# Patient Record
Sex: MALE | Race: WHITE | NOT HISPANIC OR LATINO | Employment: UNEMPLOYED | URBAN - METROPOLITAN AREA
[De-identification: names, ages, dates, MRNs, and addresses within clinical notes are randomized per-mention and may not be internally consistent; named-entity substitution may affect disease eponyms.]

---

## 2018-07-21 ENCOUNTER — HOSPITAL ENCOUNTER (EMERGENCY)
Facility: CLINIC | Age: 28
Discharge: HOME | End: 2018-07-21
Attending: EMERGENCY MEDICINE | Admitting: EMERGENCY MEDICINE
Payer: COMMERCIAL

## 2018-07-21 ENCOUNTER — APPOINTMENT (OUTPATIENT)
Dept: GENERAL RADIOLOGY | Facility: CLINIC | Age: 28
End: 2018-07-21
Attending: EMERGENCY MEDICINE
Payer: COMMERCIAL

## 2018-07-21 ENCOUNTER — APPOINTMENT (OUTPATIENT)
Dept: ULTRASOUND IMAGING | Facility: CLINIC | Age: 28
End: 2018-07-21
Attending: EMERGENCY MEDICINE
Payer: COMMERCIAL

## 2018-07-21 VITALS
SYSTOLIC BLOOD PRESSURE: 124 MMHG | DIASTOLIC BLOOD PRESSURE: 72 MMHG | OXYGEN SATURATION: 99 % | RESPIRATION RATE: 18 BRPM | TEMPERATURE: 97.1 F | HEIGHT: 71 IN | WEIGHT: 180 LBS | BODY MASS INDEX: 25.2 KG/M2

## 2018-07-21 DIAGNOSIS — R11.2 NON-INTRACTABLE VOMITING WITH NAUSEA, UNSPECIFIED VOMITING TYPE: ICD-10-CM

## 2018-07-21 DIAGNOSIS — E86.0 DEHYDRATION: ICD-10-CM

## 2018-07-21 DIAGNOSIS — R10.13 ABDOMINAL PAIN, EPIGASTRIC: ICD-10-CM

## 2018-07-21 DIAGNOSIS — R07.9 CHEST PAIN, UNSPECIFIED TYPE: ICD-10-CM

## 2018-07-21 DIAGNOSIS — K29.00 ACUTE GASTRITIS, PRESENCE OF BLEEDING UNSPECIFIED, UNSPECIFIED GASTRITIS TYPE: ICD-10-CM

## 2018-07-21 LAB
ABO + RH BLD: NORMAL
ABO + RH BLD: NORMAL
ALBUMIN SERPL-MCNC: 5.3 G/DL (ref 3.4–5)
ALBUMIN UR-MCNC: 30 MG/DL
ALP SERPL-CCNC: 67 U/L (ref 40–150)
ALT SERPL W P-5'-P-CCNC: 44 U/L (ref 0–70)
ANION GAP SERPL CALCULATED.3IONS-SCNC: 15 MMOL/L (ref 3–14)
ANION GAP SERPL CALCULATED.3IONS-SCNC: 21 MMOL/L (ref 3–14)
APPEARANCE UR: CLEAR
AST SERPL W P-5'-P-CCNC: 36 U/L (ref 0–45)
BASOPHILS # BLD AUTO: 0 10E9/L (ref 0–0.2)
BASOPHILS NFR BLD AUTO: 0.1 %
BILIRUB SERPL-MCNC: 0.4 MG/DL (ref 0.2–1.3)
BILIRUB UR QL STRIP: NEGATIVE
BLD GP AB SCN SERPL QL: NORMAL
BLOOD BANK CMNT PATIENT-IMP: NORMAL
BUN SERPL-MCNC: 19 MG/DL (ref 7–30)
BUN SERPL-MCNC: 19 MG/DL (ref 7–30)
CALCIUM SERPL-MCNC: 10 MG/DL (ref 8.5–10.1)
CALCIUM SERPL-MCNC: 8.6 MG/DL (ref 8.5–10.1)
CHLORIDE SERPL-SCNC: 107 MMOL/L (ref 94–109)
CHLORIDE SERPL-SCNC: 112 MMOL/L (ref 94–109)
CO2 SERPL-SCNC: 11 MMOL/L (ref 20–32)
CO2 SERPL-SCNC: 17 MMOL/L (ref 20–32)
COLOR UR AUTO: ABNORMAL
CREAT SERPL-MCNC: 1.01 MG/DL (ref 0.66–1.25)
CREAT SERPL-MCNC: 1.16 MG/DL (ref 0.66–1.25)
DIFFERENTIAL METHOD BLD: ABNORMAL
EOSINOPHIL # BLD AUTO: 0 10E9/L (ref 0–0.7)
EOSINOPHIL NFR BLD AUTO: 0 %
ERYTHROCYTE [DISTWIDTH] IN BLOOD BY AUTOMATED COUNT: 12.9 % (ref 10–15)
GFR SERPL CREATININE-BSD FRML MDRD: 75 ML/MIN/1.7M2
GFR SERPL CREATININE-BSD FRML MDRD: 88 ML/MIN/1.7M2
GLUCOSE SERPL-MCNC: 125 MG/DL (ref 70–99)
GLUCOSE SERPL-MCNC: 127 MG/DL (ref 70–99)
GLUCOSE UR STRIP-MCNC: NEGATIVE MG/DL
HCT VFR BLD AUTO: 47.5 % (ref 40–53)
HGB BLD-MCNC: 16.6 G/DL (ref 13.3–17.7)
HGB UR QL STRIP: ABNORMAL
IMM GRANULOCYTES # BLD: 0.2 10E9/L (ref 0–0.4)
IMM GRANULOCYTES NFR BLD: 0.6 %
KETONES UR STRIP-MCNC: 80 MG/DL
LEUKOCYTE ESTERASE UR QL STRIP: NEGATIVE
LIPASE SERPL-CCNC: 72 U/L (ref 73–393)
LYMPHOCYTES # BLD AUTO: 1 10E9/L (ref 0.8–5.3)
LYMPHOCYTES NFR BLD AUTO: 3.2 %
MCH RBC QN AUTO: 31.6 PG (ref 26.5–33)
MCHC RBC AUTO-ENTMCNC: 34.9 G/DL (ref 31.5–36.5)
MCV RBC AUTO: 90 FL (ref 78–100)
MONOCYTES # BLD AUTO: 1 10E9/L (ref 0–1.3)
MONOCYTES NFR BLD AUTO: 2.9 %
NEUTROPHILS # BLD AUTO: 30.1 10E9/L (ref 1.6–8.3)
NEUTROPHILS NFR BLD AUTO: 93.2 %
NITRATE UR QL: NEGATIVE
NRBC # BLD AUTO: 0 10*3/UL
NRBC BLD AUTO-RTO: 0 /100
PH UR STRIP: 5 PH (ref 5–7)
PLATELET # BLD AUTO: 378 10E9/L (ref 150–450)
POTASSIUM SERPL-SCNC: 4.6 MMOL/L (ref 3.4–5.3)
POTASSIUM SERPL-SCNC: 5.2 MMOL/L (ref 3.4–5.3)
PROT SERPL-MCNC: 9.5 G/DL (ref 6.8–8.8)
RBC # BLD AUTO: 5.26 10E12/L (ref 4.4–5.9)
RBC #/AREA URNS AUTO: <1 /HPF (ref 0–2)
SODIUM SERPL-SCNC: 139 MMOL/L (ref 133–144)
SODIUM SERPL-SCNC: 144 MMOL/L (ref 133–144)
SOURCE: ABNORMAL
SP GR UR STRIP: 1.02 (ref 1–1.03)
SPECIMEN EXP DATE BLD: NORMAL
SQUAMOUS #/AREA URNS AUTO: <1 /HPF (ref 0–1)
UROBILINOGEN UR STRIP-MCNC: NORMAL MG/DL (ref 0–2)
WBC # BLD AUTO: 32.3 10E9/L (ref 4–11)
WBC #/AREA URNS AUTO: 1 /HPF (ref 0–5)

## 2018-07-21 PROCEDURE — 25000132 ZZH RX MED GY IP 250 OP 250 PS 637: Performed by: EMERGENCY MEDICINE

## 2018-07-21 PROCEDURE — 96375 TX/PRO/DX INJ NEW DRUG ADDON: CPT

## 2018-07-21 PROCEDURE — 81001 URINALYSIS AUTO W/SCOPE: CPT | Performed by: EMERGENCY MEDICINE

## 2018-07-21 PROCEDURE — 86850 RBC ANTIBODY SCREEN: CPT | Performed by: EMERGENCY MEDICINE

## 2018-07-21 PROCEDURE — 86901 BLOOD TYPING SEROLOGIC RH(D): CPT | Performed by: EMERGENCY MEDICINE

## 2018-07-21 PROCEDURE — 76705 ECHO EXAM OF ABDOMEN: CPT

## 2018-07-21 PROCEDURE — 80053 COMPREHEN METABOLIC PANEL: CPT | Performed by: EMERGENCY MEDICINE

## 2018-07-21 PROCEDURE — 99285 EMERGENCY DEPT VISIT HI MDM: CPT | Mod: 25

## 2018-07-21 PROCEDURE — 96376 TX/PRO/DX INJ SAME DRUG ADON: CPT

## 2018-07-21 PROCEDURE — 96365 THER/PROPH/DIAG IV INF INIT: CPT

## 2018-07-21 PROCEDURE — 93005 ELECTROCARDIOGRAM TRACING: CPT

## 2018-07-21 PROCEDURE — 25000125 ZZHC RX 250: Performed by: EMERGENCY MEDICINE

## 2018-07-21 PROCEDURE — 83690 ASSAY OF LIPASE: CPT | Performed by: EMERGENCY MEDICINE

## 2018-07-21 PROCEDURE — 85025 COMPLETE CBC W/AUTO DIFF WBC: CPT | Performed by: EMERGENCY MEDICINE

## 2018-07-21 PROCEDURE — 86900 BLOOD TYPING SEROLOGIC ABO: CPT | Performed by: EMERGENCY MEDICINE

## 2018-07-21 PROCEDURE — 71046 X-RAY EXAM CHEST 2 VIEWS: CPT

## 2018-07-21 PROCEDURE — 96361 HYDRATE IV INFUSION ADD-ON: CPT

## 2018-07-21 PROCEDURE — 25000128 H RX IP 250 OP 636: Performed by: EMERGENCY MEDICINE

## 2018-07-21 PROCEDURE — 80048 BASIC METABOLIC PNL TOTAL CA: CPT | Performed by: EMERGENCY MEDICINE

## 2018-07-21 RX ORDER — MORPHINE SULFATE 4 MG/ML
4 INJECTION, SOLUTION INTRAMUSCULAR; INTRAVENOUS ONCE
Status: COMPLETED | OUTPATIENT
Start: 2018-07-21 | End: 2018-07-21

## 2018-07-21 RX ORDER — ONDANSETRON 2 MG/ML
4 INJECTION INTRAMUSCULAR; INTRAVENOUS EVERY 30 MIN PRN
Status: DISCONTINUED | OUTPATIENT
Start: 2018-07-21 | End: 2018-07-21 | Stop reason: HOSPADM

## 2018-07-21 RX ORDER — ONDANSETRON 2 MG/ML
4 INJECTION INTRAMUSCULAR; INTRAVENOUS ONCE
Status: COMPLETED | OUTPATIENT
Start: 2018-07-21 | End: 2018-07-21

## 2018-07-21 RX ORDER — SUCRALFATE 1 G/1
1 TABLET ORAL 4 TIMES DAILY
Qty: 40 TABLET | Refills: 1 | Status: SHIPPED | OUTPATIENT
Start: 2018-07-21 | End: 2023-09-26

## 2018-07-21 RX ORDER — MORPHINE SULFATE 4 MG/ML
4 INJECTION, SOLUTION INTRAMUSCULAR; INTRAVENOUS
Status: DISCONTINUED | OUTPATIENT
Start: 2018-07-21 | End: 2018-07-21 | Stop reason: HOSPADM

## 2018-07-21 RX ORDER — FAMOTIDINE 20 MG/1
20 TABLET, FILM COATED ORAL ONCE
Status: COMPLETED | OUTPATIENT
Start: 2018-07-21 | End: 2018-07-21

## 2018-07-21 RX ORDER — ONDANSETRON 4 MG/1
4 TABLET, ORALLY DISINTEGRATING ORAL EVERY 8 HOURS PRN
Qty: 10 TABLET | Refills: 0 | Status: SHIPPED | OUTPATIENT
Start: 2018-07-21 | End: 2018-07-24

## 2018-07-21 RX ADMIN — PANTOPRAZOLE SODIUM 40 MG: 40 INJECTION, POWDER, FOR SOLUTION INTRAVENOUS at 04:29

## 2018-07-21 RX ADMIN — SODIUM CHLORIDE 1000 ML: 9 INJECTION, SOLUTION INTRAVENOUS at 05:09

## 2018-07-21 RX ADMIN — FOLIC ACID: 5 INJECTION, SOLUTION INTRAMUSCULAR; INTRAVENOUS; SUBCUTANEOUS at 05:54

## 2018-07-21 RX ADMIN — MORPHINE SULFATE 4 MG: 4 INJECTION INTRAVENOUS at 04:22

## 2018-07-21 RX ADMIN — SODIUM CHLORIDE 1000 ML: 9 INJECTION, SOLUTION INTRAVENOUS at 04:28

## 2018-07-21 RX ADMIN — FAMOTIDINE 20 MG: 10 INJECTION INTRAVENOUS at 04:37

## 2018-07-21 RX ADMIN — LIDOCAINE HYDROCHLORIDE 30 ML: 20 SOLUTION ORAL; TOPICAL at 05:18

## 2018-07-21 RX ADMIN — MORPHINE SULFATE 4 MG: 4 INJECTION INTRAVENOUS at 05:00

## 2018-07-21 RX ADMIN — FAMOTIDINE 20 MG: 20 TABLET, FILM COATED ORAL at 08:40

## 2018-07-21 RX ADMIN — ONDANSETRON 4 MG: 2 INJECTION, SOLUTION INTRAMUSCULAR; INTRAVENOUS at 04:32

## 2018-07-21 ASSESSMENT — ENCOUNTER SYMPTOMS
CHEST TIGHTNESS: 1
VOMITING: 1

## 2018-07-21 NOTE — ED PROVIDER NOTES
"  History     Chief Complaint:  Chest Pain     HPI   Chuck Magdaleno is a 27 year old male who presents with chest pain. The patient states he has had emesis for 14 hours. The patient states he has chest pain on the right side for the past 45 minutes following the emesis. The patient states that it hurts to breath. The patient also notes he is thirsty. Of note: the sister states the patient is hung over.     Allergies:  No Known Drug Allergies    Medications:    Medications reviewed. No current medications.     Past Medical History:    Alcohol Abuse   Marijuana dependence  Depression     Past Surgical History:    Appendectomy     Family History:    Family history reviewed. No pertinent family history.     Social History:  The patient was accompanied to the ED by sister.  Alcohol Use: Positive  Marital Status:  Single     Review of Systems   Constitutional:        Thirsty   Respiratory: Positive for chest tightness.    Cardiovascular: Positive for chest pain.   Gastrointestinal: Positive for vomiting.   All other systems reviewed and are negative.      Physical Exam     Patient Vitals for the past 24 hrs:   BP Temp Temp src Heart Rate Resp SpO2 Height Weight   07/21/18 0531 - - - 110 - 99 % - -   07/21/18 0514 - - - - 20 - - -   07/21/18 0503 - - - 104 - 97 % - -   07/21/18 0447 - - - 116 24 96 % - -   07/21/18 0435 - - - 117 - 94 % - -   07/21/18 0434 142/76 - - - - - - -   07/21/18 0428 - - - 128 - 94 % - -   07/21/18 0411 (!) 180/103 97.1  F (36.2  C) Oral 137 20 95 % 1.803 m (5' 11\") 81.6 kg (180 lb)   07/21/18 0410 - - - 138 (!) 31 95 % - -   07/21/18 0406 - 97.9  F (36.6  C) Oral 150 20 97 % 1.803 m (5' 11\") 81.6 kg (180 lb)       Physical Exam  Constitutional:  Appears well-developed and well-nourished. Cooperative. Uncomfortable. Conversational disnea   HENT:   Head:    Atraumatic.   Mouth/Throat:   Oropharynx is without erythema or exudate and mucous     membranes are dry.   Eyes:    Conjunctivae normal " and EOM are normal.      Pupils are equal, round, and reactive to light.   Neck:    Normal range of motion. Neck supple.   Cardiovascular:  Tachycardia, regular rhythm, normal heart sounds and radial and dorsalis pedis pulses are 2+ and symmetric.    Pulmonary/Chest:  Effort normal and breath sounds are diminished in both lung bases. No rales or wheezes.   Abdominal:   Soft. Bowel sounds are normal.      No splenomegaly or hepatomegaly. Tender in mid epigastric right upper quadrant. No rebound.   Musculoskeletal:  Normal range of motion. No edema and no tenderness.   Neurological:  Alert. Normal strength. No cranial nerve deficit.   Skin:    Skin is warm and dry.   Psychiatric:   Normal mood and affect.     Emergency Department Course     ECG:  ECG taken at 0411, ECG read at 0413  Sinus Tachycardia  Otherwise Normal ECG  Rate 134 bpm. CO interval 122 ms. QRS duration 76 ms. QT/QTc 300/448 ms. P-R-T axes 76 68 81.    Imaging:  Radiology findings were communicated with the patient who voiced understanding of the findings.    Chest XR,  PA & LAT   No infiltrates or other acute findings. Heart size is  within normal limits.  JOAN FARNK MD  Reading per radiology    Laboratory:  Laboratory findings were communicated with the patient who voiced understanding of the findings.    CBC: WBC 32.3(H), HGB 16.6,   CMP: glucose 125 (H), Carbon dioxide 11(L), Anion Gap 21(H), Albumin 5.3(H), protein 9.5(H) o/w WNL (Creatinine 1.16)  Lipase: 72(L)  ABO/Rh type and screen: O-, antibody screen negative     Interventions:  0422 Morphine 4 mg IV  0428 NS 1000 mL IV  0429 Protonix 40 mg IV  0432 Zofran 4 mg IV  0437 Pepcid 20 mg IV  0500 Morphine 4 mg IV  0509 NS 1000 mL IV  0518 GI Cocktail 30 mL Oral     Emergency Department Course:    0408 Nursing notes and vitals reviewed.    0415 I performed an exam of the patient as documented above.     0415 IV was inserted and blood was drawn for laboratory testing, results  above.    0452 The patient was sent for a Chest XR while in the emergency department, results above.     0545 I returned to update the patient.     0600 I transferred care to my college Dr. Contreras    Impression & Plan      Medical Decision Making:  Chuck Magdaleno is a 27 year old male who presents complaining of nausea, vomiting, and sudden worsening of right upper quadrant and chest pain which prompted his ED visit. HE reports pain with movements and deep breaths. Clinically the patient appears dehydrated. He is tachycardic. He has a diffusely tender but non focal abdominal exam. An IV established and received 2 L of fluid, morphine in divided doses, Protonix, and Pepcid. Symptoms to improve with this. After GI cocktail he stated that pain had nearly resolved. Repeated abdominal exam is soft and nontender. His lungs sound clear.   [place] blood [place] at [address] with left shift. There is no fever or peritoneal signs. There is no history of aspiration, and chest Xray looks normal without any free air to the diaphragm. No pneumothorax, pneumomediastinum infiltrate, abnormal cardiac or silhouette abnormality. The patient has no risk for PE. His EKG showed sinus tachycardia without ischemic changes. CMP returned showing a low bicarbonate 11 and an anion gap of 21. I suspect that this is due to dehydration and hyperventilation. Likely an elevated serum lactate. He denies any co injections or drinking non ethanol alcohol.   An IV banana bag is ordered. The plan is to hydrate him and repeat a metabiotic panel.  His acid base seems to be correcting and the patient is not having any worsening symptoms or ongoing abdominal pain. The plan will be for discharge. Even with his elevated white cell count, I do not suspect an intraabdominal catastrophe. If pain reoccurs or exam changes, CT imaging may be indicated.   I discussed all this with Dr. Contreras at 0600 and have signed the patient out to him. He will  follow up on the repeat metabolic panel and exam. Urinalysis is also pending as the patient's pain is right sided I want to ensure there are no signs of hematuria.   I discuss test results thus far with the patient and his sister as well as the treatment plan. IF symptoms and labs continue to improve, the plan is for discharge. I will have the patient take omeprazole for a likely gastritis or ulcer and follow up with primary care. Preliminary impression.     Diagnosis:    ICD-10-CM    1. Abdominal pain, epigastric R10.13 UA with Microscopic   2. Chest pain, unspecified type R07.9    3. Non-intractable vomiting with nausea, unspecified vomiting type R11.2    4. Acute gastritis, presence of bleeding unspecified, unspecified gastritis type K29.00    5. Dehydration E86.0         Disposition:   I transferred care to my [facility] Dr. Contreras    Discharge Medications:  Discharge Medication List as of 7/21/2018  9:06 AM      START taking these medications    Details   omeprazole (PRILOSEC) 20 MG CR capsule Take 1 capsule (20 mg) by mouth 2 times daily, Disp-60 capsule, R-0, Local Print      ondansetron (ZOFRAN ODT) 4 MG ODT tab Take 1 tablet (4 mg) by mouth every 8 hours as needed for nausea, Disp-10 tablet, R-0, Local Print      sucralfate (CARAFATE) 1 GM tablet Take 1 tablet (1 g) by mouth 4 times daily, Disp-40 tablet, R-1, Local Print           Scribe Disclosure:  I, Karen Ferreiraion, am serving as a scribe at 4:14 AM on 7/21/2018 to document services personally performed by Sriram Martinez MD based on my observations and the provider's statements to me.   EMERGENCY DEPARTMENT       Sriram Martinez MD  07/22/18 0751

## 2018-07-21 NOTE — ED AVS SNAPSHOT
Emergency Department    [address] [place] [id number]    Phone:  [phone]    Fax:  [phone]                                       Chuck Magdaleno   MRN: [id number]    Department:   Emergency Department   Date of Visit:  7/21/2018           After Visit Summary Signature Page     I have received my discharge instructions, and my questions have been answered. I have discussed any challenges I see with this plan with the nurse or doctor.    ..........................................................................................................................................  Patient/Patient Representative Signature      ..........................................................................................................................................  Patient Representative Print Name and Relationship to Patient    ..................................................               ................................................  Date                                            Time    ..........................................................................................................................................  Reviewed by Signature/Title    ...................................................              ..............................................  Date                                                            Time

## 2018-07-21 NOTE — DISCHARGE INSTRUCTIONS
*CHEST PAIN, NONCARDIAC    Based on your visit today, the exact cause of your chest pain is not certain. Your condition does not seem serious and your pain does not appear to be coming from your heart. However, sometimes the signs of a serious problem take more time to appear. Therefore, please watch for the warning signs listed below.  HOME CARE:  1. Rest today and avoid strenuous activity.  2. Take any prescribed medicine as directed.  FOLLOW UP with your doctor in 1-3 days.   GET PROMPT MEDICAL ATTENTION if any of the following occur:    A change in the type of pain: if it feels different, becomes more severe, lasts longer, or begins to spread into your shoulder, arm, neck, jaw or back    Shortness of breath or increased pain with breathing    Cough with blood or dark colored sputum (phlegm)    Weakness, dizziness, or fainting    Fever over 101  F (38.3  C)    Swelling, pain or redness in one leg    8538-2196 [employer]. [address] [place] [address]. All rights reserved. This information is not intended as a substitute for professional medical care. Always follow your healthcare professional's instructions.  This information has been modified by your health care provider with permission from the publisher.      Dehydration (Adult)  Dehydration occurs when your body loses too much fluid. This may be the result of prolonged vomiting or diarrhea, excessive sweating, or a high fever. It may also happen if you don t drink enough fluid when you re sick or out in the heat. Misuse of diuretics (water pills) can also be a cause.  Symptoms include thirst and decreased urine output. You may also feel dizzy, weak, fatigued, or very drowsy. The diet described below is usually enough to treat dehydration. In some cases, you may need medicine.  Home care    Drink at least 12 8-ounce glasses of fluid every day to resolve the dehydration. Fluid may include water; orange juice; lemonade; apple, grape,  or cranberry juice; clear fruit drinks; electrolyte replacement and sports drinks; and teas and coffee without caffeine. Don't drink alcohol. If you have been diagnosed with a kidney disease, ask your doctor how much and what types of fluids you should drink to prevent dehydration. If you have kidney disease, fluid can build up in the body. This can be dangerous to your health.    If you have a fever, muscle aches, or a headache as a result of a cold or flu, you may take acetaminophen or ibuprofen, unless another medicine was prescribed. If you have chronic liver or kidney disease, or have ever had a stomach ulcer or gastrointestinal bleeding, talk with your healthcare provider before using these medicines. Don't take aspirin if you are younger than 18 and have a fever. Aspirin raises the chance for severe liver injury.  Follow-up care  Follow up with your healthcare provider, or as advised.  When to seek medical advice  Call your healthcare provider right away if any of these occur:    Continued vomiting    Frequent diarrhea (more than 5 times a day); blood (red or black color) or mucus in diarrhea    Blood in vomit or stool    Swollen abdomen or increasing abdominal pain    Weakness, dizziness, or fainting    Unusual drowsiness or confusion    Reduced urine output or extreme thirst    Fever of 100.4 F (38 C) or higher  Date Last Reviewed: 5/1/2017 2000-2017 [employer]. [address] [place] [address]. All rights reserved. This information is not intended as a substitute for professional medical care. Always follow your healthcare professional's instructions.          Gastritis or Ulcer, No Antibiotic Treatment    Gastritis is irritation and inflammation of the stomach lining. This means the lining is red and swollen. An ulcer is an open sore in the lining of the stomach. It may also occur in the first part of the small intestine (duodenum). The causes and symptoms of gastritis and  ulcers are very similar.  Causes and risk factors for both problems can include:    Long-term use of nonsteroidal anti-inflammatory drugs (NSAIDs), such as aspirin and ibuprofen    H. pylori bacteria infection    Tobacco use    Alcohol use  Symptoms for both problems can include:    Dull or burning pain in the upper part of the belly    Loss of appetite    Heartburn or upset stomach    Frequent burping    Bloated feeling    Nausea with or without vomiting  You likely had an evaluation to help determine the exact cause and extent of your problem. This may have included a health history, exam, and certain tests.  Results showed that your problem is not due to H. pylori infection. For this reason, no antibiotics are needed as part of your treatment.  Whether your problem is found to be gastritis or an ulcer, you will still need to take other medicines, however. You will also need to follow instructions to help reduce stomach irritation so your stomach can heal.   Home care    Take any medicines you re prescribed exactly as directed. Common medicines used to treat gastritis include:  ? Antacids. These help neutralize the normal acids in your stomach.  ? Proton pump inhibitors. These block your stomach from making any acid.  ? H2 blockers.These reduce the amount of acid your stomach makes.  ? Bismuth subsalicylate. This helps protect the lining of your stomach from acid.    Don't take any NSAIDs during your treatment. If you take NSAID to help treat other health problems, tell your healthcare provider. He or she may need to adjust your medicine plan or change the dosage.    Don t use tobacco. Also don t drink alcohol. These products can increase the amount of acid your stomach makes. This can delay healing. It can also worsen symptoms.  Follow-up care  Follow up with your healthcare provider, or as advised. In some cases, further testing may be needed.  When to seek medical advice  Call your healthcare provider right  "away if any of these occur:    Fever of 100.4 F (38 C) or higher, or as directed by your healthcare provider    Stomach pain that worsens or moves to the lower right part of abdomen    Extreme fatigue    Weakness or dizziness    Continued weight loss    Frequent vomiting, blood in your vomit, or coffee ground-like substance in your vomit    Black, tarry, or bloody stools  Call 911  Call 911 if any of these occur:    Chest pain appears or worsens, or spreads to the back, neck, shoulder, or arm    Unusually fast heart rate    Trouble breathing or swallowing    Confusion     * VOMITING [6yr-Adult]  Vomiting is a common symptom that may be due to different causes. These include gastroenteritis (\"stomach-flu\"), food poisoning and gastritis. There are other more serious causes of vomiting which may be hard to diagnose early in the illness. Therefore, it is important to watch for the warning signs listed below.  The main danger from repeated vomiting is \"dehydration\". This is due to excess loss of water and minerals from the body. When this occurs, body fluids must be replaced.`  HOME CARE:    If symptoms are severe, rest at home for the next 24 hours.  You may use acetaminophen (Tylenol) 650-1000 mg every 6 hours to control fever, unless another medicine was prescribed. [ NOTE : If you have chronic liver disease, talk with your doctor before using acetaminophen.] (Aspirin should never be used in anyone under 18 years of age who is ill with a fever. It may cause severe liver damage.)  Avoid tobacco and alcohol use, which may worsen your symptoms.  If medicines for vomiting were prescribed, take as directed.  DURING THE FIRST 12-24 HOURS follow the diet below. Try to take frequent small sips even if you vomit occasionally:  FRUIT JUICES: Apple, grape juice, clear fruit drinks, electrolyte replacement and sports drinks.  BEVERAGES: Sport drinks such as Gatorade, soft drinks without caffeine; mineral water (plain or " flavored), decaffeinated tea and coffee.  SOUPS: Clear broth, consommé and bouillon  DESSERTS: Plain gelatin (Jell-O), popsicles and fruit juice bars.  DURING THE NEXT 24 HOURS you may add the following to the above:  Hot cereal, plain toast, bread, rolls, crackers  Plain noodles, rice, mashed potatoes, chicken noodle or rice soup  Unsweetened canned fruit (avoid pineapple), bananas  Avoid dairy products  Limit caffeine and chocolate. No spices or seasonings except salt.  DURING THE NEXT 24 HOURS  Slowly go back to a normal diet, as you feel better and your symptoms lessen.  FOLLOW UP with your doctor as advised if you are not improving over the next 2-3 days.  GET PROMPT MEDICAL ATTENTION if any of the following occur:  Constant abdominal pain that stays in the same spot or gets worse  Continued vomiting (unable to keep liquids down) for 24 hours  Frequent diarrhea (more than 5 times a day); blood (red or black color) in diarrhea  No urine output for 12 hours or extreme thirst  Weakness, dizziness or fainting  Unusually drowsy or confused  Fever over 101.0  F (38.3  C) for more than 3 days  Yellow color of the eyes or skin    1142-2501 [employer]. [address] [place] [address]. All rights reserved. This information is not intended as a substitute for professional medical care. Always follow your healthcare professional's instructions.  This information has been modified by your health care provider with permission from the publisher.        Extreme drowsiness or trouble waking up    Fainting    Large amounts of blood present in vomit or stool  Date Last Reviewed: 6/16/2015 2000-2017 [employer]. [address] [place] [address]. All rights reserved. This information is not intended as a substitute for professional medical care. Always follow your healthcare professional's instructions.

## 2018-07-21 NOTE — ED NOTES
ED course:  I received patient as a handoff from my partner Dr. Christianson.  On reevaluation patient notes mild persistent right upper quadrant pain and therefore right upper quadrant ultrasound was obtained and unremarkable.  Repeat basic metabolic panel shows improvement in patient's anion gap; likely secondary to starvation ketosis.  Patient remained afebrile and heart rate continued to trend down with hydration.  He was provided additional Pepcid for pain control.  Repeat abdominal exam was benign just prior to discharge.  Recommended close follow-up with PCP.  Return precautions discussed.  Patient well-appearing at time of discharge.  Please see Dr. Christianson's note for additional details.    Impression:    ICD-10-CM    1. Abdominal pain, epigastric R10.13 UA with Microscopic   2. Chest pain, unspecified type R07.9    3. Non-intractable vomiting with nausea, unspecified vomiting type R11.2    4. Acute gastritis, presence of bleeding unspecified, unspecified gastritis type K29.00    5. Dehydration E86.0        Disposition:  Discharged home     Silvano Contreras,   07/21/18 0951

## 2018-07-21 NOTE — ED AVS SNAPSHOT
Emergency Department    [address] [facility] [address]    Phone:  [phone]    Fax:  [phone]                                       Chuck Magdaleno   MRN: [id number]    Department:   Emergency Department   Date of Visit:  7/21/2018           Patient Information     Date Of Birth          1990        Your diagnoses for this visit were:     Abdominal pain, epigastric     Chest pain, unspecified type     Non-intractable vomiting with nausea, unspecified vomiting type     Acute gastritis, presence of bleeding unspecified, unspecified gastritis type     Dehydration        You were seen by Sriram Martinez MD and Silvano Contreras DO.      Follow-up Information     Follow up with Tucker Chan MD. Schedule an appointment as soon as possible for a visit in 2 days.    Specialty:  Family Practice    Contact information:    [facility]  [address] [id number] [phone]          Follow up with  Emergency Department.    Specialty:  EMERGENCY MEDICINE    Why:  If symptoms worsen    Contact information:    [address] [facility] [address] [phone]        Discharge Instructions         *CHEST PAIN, NONCARDIAC    Based on your visit today, the exact cause of your chest pain is not certain. Your condition does not seem serious and your pain does not appear to be coming from your heart. However, sometimes the signs of a serious problem take more time to appear. Therefore, please watch for the warning signs listed below.  HOME CARE:  1. Rest today and avoid strenuous activity.  2. Take any prescribed medicine as directed.  FOLLOW UP with your doctor in 1-3 days.   GET PROMPT MEDICAL ATTENTION if any of the following occur:    A change in the type of pain: if it feels different, becomes more severe, lasts longer, or begins to spread into your shoulder, arm, neck, jaw or back    Shortness of breath or increased pain  with breathing    Cough with blood or dark colored sputum (phlegm)    Weakness, dizziness, or fainting    Fever over 101  F (38.3  C)    Swelling, pain or redness in one leg    0609-7402 [employer]. [address] [place] [address], [place] [address]. All rights reserved. This information is not intended as a substitute for professional medical care. Always follow your healthcare professional's instructions.  This information has been modified by your health care provider with permission from the publisher.      Dehydration (Adult)  Dehydration occurs when your body loses too much fluid. This may be the result of prolonged vomiting or diarrhea, excessive sweating, or a high fever. It may also happen if you don t drink enough fluid when you re sick or out in the heat. Misuse of diuretics (water pills) can also be a cause.  Symptoms include thirst and decreased urine output. You may also feel dizzy, weak, fatigued, or very drowsy. The diet described below is usually enough to treat dehydration. In some cases, you may need medicine.  Home care    Drink at least 12 8-ounce glasses of fluid every day to resolve the dehydration. Fluid may include water; orange juice; lemonade; apple, grape, or cranberry juice; clear fruit drinks; electrolyte replacement and sports drinks; and teas and coffee without caffeine. Don't drink alcohol. If you have been diagnosed with a kidney disease, ask your doctor how much and what types of fluids you should drink to prevent dehydration. If you have kidney disease, fluid can build up in the body. This can be dangerous to your health.    If you have a fever, muscle aches, or a headache as a result of a cold or flu, you may take acetaminophen or ibuprofen, unless another medicine was prescribed. If you have chronic liver or kidney disease, or have ever had a stomach ulcer or gastrointestinal bleeding, talk with your healthcare provider before using these medicines. Don't take aspirin if  you are younger than 18 and have a fever. Aspirin raises the chance for severe liver injury.  Follow-up care  Follow up with your healthcare provider, or as advised.  When to seek medical advice  Call your healthcare provider right away if any of these occur:    Continued vomiting    Frequent diarrhea (more than 5 times a day); blood (red or black color) or mucus in diarrhea    Blood in vomit or stool    Swollen abdomen or increasing abdominal pain    Weakness, dizziness, or fainting    Unusual drowsiness or confusion    Reduced urine output or extreme thirst    Fever of 100.4 F (38 C) or higher  Date Last Reviewed: 5/1/2017 2000-2017 [employer]. [address] [place], [place] [id number]. All rights reserved. This information is not intended as a substitute for professional medical care. Always follow your healthcare professional's instructions.          Gastritis or Ulcer, No Antibiotic Treatment    Gastritis is irritation and inflammation of the stomach lining. This means the lining is red and swollen. An ulcer is an open sore in the lining of the stomach. It may also occur in the first part of the small intestine (duodenum). The causes and symptoms of gastritis and ulcers are very similar.  Causes and risk factors for both problems can include:    Long-term use of nonsteroidal anti-inflammatory drugs (NSAIDs), such as aspirin and ibuprofen    H. pylori bacteria infection    Tobacco use    Alcohol use  Symptoms for both problems can include:    Dull or burning pain in the upper part of the belly    Loss of appetite    Heartburn or upset stomach    Frequent burping    Bloated feeling    Nausea with or without vomiting  You likely had an evaluation to help determine the exact cause and extent of your problem. This may have included a health history, exam, and certain tests.  Results showed that your problem is not due to H. pylori infection. For this reason, no antibiotics are needed as part of  "your treatment.  Whether your problem is found to be gastritis or an ulcer, you will still need to take other medicines, however. You will also need to follow instructions to help reduce stomach irritation so your stomach can heal.   Home care    Take any medicines you re prescribed exactly as directed. Common medicines used to treat gastritis include:  ? Antacids. These help neutralize the normal acids in your stomach.  ? Proton pump inhibitors. These block your stomach from making any acid.  ? H2 blockers.These reduce the amount of acid your stomach makes.  ? Bismuth subsalicylate. This helps protect the lining of your stomach from acid.    Don't take any NSAIDs during your treatment. If you take NSAID to help treat other health problems, tell your healthcare provider. He or she may need to adjust your medicine plan or change the dosage.    Don t use tobacco. Also don t drink alcohol. These products can increase the amount of acid your stomach makes. This can delay healing. It can also worsen symptoms.  Follow-up care  Follow up with your healthcare provider, or as advised. In some cases, further testing may be needed.  When to seek medical advice  Call your healthcare provider right away if any of these occur:    Fever of 100.4 F (38 C) or higher, or as directed by your healthcare provider    Stomach pain that worsens or moves to the lower right part of abdomen    Extreme fatigue    Weakness or dizziness    Continued weight loss    Frequent vomiting, blood in your vomit, or coffee ground-like substance in your vomit    Black, tarry, or bloody stools  Call 911  Call 911 if any of these occur:    Chest pain appears or worsens, or spreads to the back, neck, shoulder, or arm    Unusually fast heart rate    Trouble breathing or swallowing    Confusion     * VOMITING [6yr-Adult]  Vomiting is a common symptom that may be due to different causes. These include gastroenteritis (\"stomach-flu\"), food poisoning and " "gastritis. There are other more serious causes of vomiting which may be hard to diagnose early in the illness. Therefore, it is important to watch for the warning signs listed below.  The main danger from repeated vomiting is \"dehydration\". This is due to excess loss of water and minerals from the body. When this occurs, body fluids must be replaced.`  HOME CARE:    If symptoms are severe, rest at home for the next 24 hours.  You may use acetaminophen (Tylenol) 650-1000 mg every 6 hours to control fever, unless another medicine was prescribed. [ NOTE : If you have chronic liver disease, talk with your doctor before using acetaminophen.] (Aspirin should never be used in anyone under 18 years of age who is ill with a fever. It may cause severe liver damage.)  Avoid tobacco and alcohol use, which may worsen your symptoms.  If medicines for vomiting were prescribed, take as directed.  DURING THE FIRST 12-24 HOURS follow the diet below. Try to take frequent small sips even if you vomit occasionally:  FRUIT JUICES: Apple, grape juice, clear fruit drinks, electrolyte replacement and sports drinks.  BEVERAGES: Sport drinks such as Gatorade, soft drinks without caffeine; mineral water (plain or flavored), decaffeinated tea and coffee.  SOUPS: Clear broth, consommé and bouillon  DESSERTS: Plain gelatin (Jell-O), popsicles and fruit juice bars.  DURING THE NEXT 24 HOURS you may add the following to the above:  Hot cereal, plain toast, bread, rolls, crackers  Plain noodles, rice, mashed potatoes, chicken noodle or rice soup  Unsweetened canned fruit (avoid pineapple), bananas  Avoid dairy products  Limit caffeine and chocolate. No spices or seasonings except salt.  DURING THE NEXT 24 HOURS  Slowly go back to a normal diet, as you feel better and your symptoms lessen.  FOLLOW UP with your doctor as advised if you are not improving over the next 2-3 days.  GET PROMPT MEDICAL ATTENTION if any of the following occur:  Constant " abdominal pain that stays in the same spot or gets worse  Continued vomiting (unable to keep liquids down) for 24 hours  Frequent diarrhea (more than 5 times a day); blood (red or black color) in diarrhea  No urine output for 12 hours or extreme thirst  Weakness, dizziness or fainting  Unusually drowsy or confused  Fever over 101.0  F (38.3  C) for more than 3 days  Yellow color of the eyes or skin    9352-5992 [employer]. [address] [place] [address]. All rights reserved. This information is not intended as a substitute for professional medical care. Always follow your healthcare professional's instructions.  This information has been modified by your health care provider with permission from the publisher.        Extreme drowsiness or trouble waking up    Fainting    Large amounts of blood present in vomit or stool  Date Last Reviewed: 6/16/2015 2000-2017 [employer]. [address] [place] [address]. All rights reserved. This information is not intended as a substitute for professional medical care. Always follow your healthcare professional's instructions.          24 Hour Appointment Hotline       To make an appointment at any [facility], call [phone] ([phone]). If you don't have a family doctor or clinic, we will help you find one. [place] [facility] clinics are conveniently located to serve the needs of you and your family.             Review of your medicines      START taking        Dose / Directions Last dose taken    omeprazole 20 MG CR capsule   Commonly known as:  priLOSEC   Dose:  20 mg   Quantity:  60 capsule        Take 1 capsule (20 mg) by mouth 2 times daily   Refills:  0        ondansetron 4 MG ODT tab   Commonly known as:  ZOFRAN ODT   Dose:  4 mg   Quantity:  10 tablet        Take 1 tablet (4 mg) by mouth every 8 hours as needed for nausea   Refills:  0        sucralfate 1 GM tablet   Commonly known as:  CARAFATE   Dose:  1 g    Quantity:  40 tablet        Take 1 tablet (1 g) by mouth 4 times daily   Refills:  1          Our records show that you are taking the medicines listed below. If these are incorrect, please call your family doctor or clinic.        Dose / Directions Last dose taken    HYDROcodone-acetaminophen 5-325 MG per tablet   Commonly known as:  NORCO   Dose:  1-2 tablet   Quantity:  20 tablet        Take 1-2 tablets by mouth every 4 hours as needed for moderate to severe pain   Refills:  0                Prescriptions were sent or printed at these locations (3 Prescriptions)                   Other Prescriptions                Printed at Department/Unit printer (3 of 3)         omeprazole (PRILOSEC) 20 MG CR capsule               ondansetron (ZOFRAN ODT) 4 MG ODT tab               sucralfate (CARAFATE) 1 GM tablet                Procedures and tests performed during your visit     ABO/Rh type and screen    Abdomen US, limited (RUQ only)    Basic metabolic panel    CBC with platelets + differential    Cardiac Continuous Monitoring    Chest XR,  PA & LAT    Comprehensive metabolic panel    EKG 12 lead    Lipase    UA with Microscopic      Orders Needing Specimen Collection     None      Pending Results     No orders found from 7/19/2018 to 7/22/2018.            Pending Culture Results     No orders found from 7/19/2018 to 7/22/2018.            Pending Results Instructions     If you had any lab results that were not finalized at the time of your Discharge, you can call the ED Lab Result RN at [phone]. You will be contacted by this team for any positive Lab results or changes in treatment. The nurses are available 7 days a week from 10A to 6:30P.  You can leave a message 24 hours per day and they will return your call.        Test Results From Your Hospital Stay        7/21/2018  5:25 AM      Narrative     XR CHEST 2 VW  7/21/2018 4:59 AM     INDICATION: Chest pain, shortness of breath, left upper abdominal pain  post  vomiting all day.    COMPARISON: None.        Impression     IMPRESSION: No infiltrates or other acute findings. Heart size is  within normal limits.    JOAN FRANK MD         7/21/2018  5:10 AM      Component Results     Component Value Ref Range & Units Status    Sodium 139 133 - 144 mmol/L Final    Potassium 4.6 3.4 - 5.3 mmol/L Final    Chloride 107 94 - 109 mmol/L Final    Carbon Dioxide 11 (L) 20 - 32 mmol/L Final    Anion Gap 21 (H) 3 - 14 mmol/L Final    Glucose 125 (H) 70 - 99 mg/dL Final    Urea Nitrogen 19 7 - 30 mg/dL Final    Creatinine 1.16 0.66 - 1.25 mg/dL Final    GFR Estimate 75 >60 mL/min/1.7m2 Final    Non  GFR Calc    GFR Estimate If Black >90 >60 mL/min/1.7m2 Final    African American GFR Calc    Calcium 10.0 8.5 - 10.1 mg/dL Final    Bilirubin Total 0.4 0.2 - 1.3 mg/dL Final    Albumin 5.3 (H) 3.4 - 5.0 g/dL Final    Protein Total 9.5 (H) 6.8 - 8.8 g/dL Final    Alkaline Phosphatase 67 40 - 150 U/L Final    ALT 44 0 - 70 U/L Final    AST 36 0 - 45 U/L Final         7/21/2018  5:08 AM      Component Results     Component Value Ref Range & Units Status    Lipase 72 (L) 73 - 393 U/L Final         7/21/2018  6:10 AM      Component Results     Component Value Ref Range & Units Status    Color Urine Light Yellow  Final    Appearance Urine Clear  Final    Glucose Urine Negative NEG^Negative mg/dL Final    Bilirubin Urine Negative NEG^Negative Final    Ketones Urine 80 (A) NEG^Negative mg/dL Final    Specific Gravity Urine 1.016 1.003 - 1.035 Final    Blood Urine Trace (A) NEG^Negative Final    pH Urine 5.0 5.0 - 7.0 pH Final    Protein Albumin Urine 30 (A) NEG^Negative mg/dL Final    Urobilinogen mg/dL Normal 0.0 - 2.0 mg/dL Final    Nitrite Urine Negative NEG^Negative Final    Leukocyte Esterase Urine Negative NEG^Negative Final    Source Midstream Urine  Final    WBC Urine 1 0 - 5 /HPF Final    RBC Urine <1 0 - 2 /HPF Final    Squamous Epithelial /HPF Urine <1 0 - 1 /HPF Final          7/21/2018  4:29 AM      Component Results     Component Value Ref Range & Units Status    WBC 32.3 (H) 4.0 - 11.0 10e9/L Final    RBC Count 5.26 4.4 - 5.9 10e12/L Final    Hemoglobin 16.6 13.3 - 17.7 g/dL Final    Hematocrit 47.5 40.0 - 53.0 % Final    MCV 90 78 - 100 fl Final    MCH 31.6 26.5 - 33.0 pg Final    MCHC 34.9 31.5 - 36.5 g/dL Final    RDW 12.9 10.0 - 15.0 % Final    Platelet Count 378 150 - 450 10e9/L Final    Diff Method Automated Method  Final    % Neutrophils 93.2 % Final    % Lymphocytes 3.2 % Final    % Monocytes 2.9 % Final    % Eosinophils 0.0 % Final    % Basophils 0.1 % Final    % Immature Granulocytes 0.6 % Final    Nucleated RBCs 0 0 /100 Final    Absolute Neutrophil 30.1 (H) 1.6 - 8.3 10e9/L Final    Absolute Lymphocytes 1.0 0.8 - 5.3 10e9/L Final    Absolute Monocytes 1.0 0.0 - 1.3 10e9/L Final    Absolute Eosinophils 0.0 0.0 - 0.7 10e9/L Final    Absolute Basophils 0.0 0.0 - 0.2 10e9/L Final    Abs Immature Granulocytes 0.2 0 - 0.4 10e9/L Final    Absolute Nucleated RBC 0.0  Final         7/21/2018  6:02 AM      Component Results     Component Value Ref Range & Units Status    ABO O  Final    RH(D) Neg  Final    Antibody Screen Neg  Final    Test Valid Only At [facility]     Final    Specimen Expires 07/24/2018  Final         7/21/2018  6:01 AM      Component Results     Component Value Ref Range & Units Status    Sodium 144 133 - 144 mmol/L Final    Potassium 5.2 3.4 - 5.3 mmol/L Final    Chloride 112 (H) 94 - 109 mmol/L Final    Carbon Dioxide 17 (L) 20 - 32 mmol/L Final    Anion Gap 15 (H) 3 - 14 mmol/L Final    Glucose 127 (H) 70 - 99 mg/dL Final    Urea Nitrogen 19 7 - 30 mg/dL Final    Creatinine 1.01 0.66 - 1.25 mg/dL Final    GFR Estimate 88 >60 mL/min/1.7m2 Final    Non  GFR Calc    GFR Estimate If Black >90 >60 mL/min/1.7m2 Final    African American GFR Calc    Calcium 8.6 8.5 - 10.1 mg/dL Final         7/21/2018  8:16 AM      Narrative      ULTRASOUND ABDOMEN LIMITED  7/21/2018 7:21 AM     HISTORY: Right upper quadrant pain, elevated white blood cell count,  vomiting. Rule out evidence of biliary pathology.       COMPARISON: None.    FINDINGS: Liver is unremarkable in echogenicity without focal lesions.  Gallbladder is normal without stones or sludge. Extrahepatic bile duct  is normal in diameter. Pancreas is completely obscured. Right kidney  is normal in size. There is no hydronephrosis.         Impression     IMPRESSION: Negative abdominal ultrasound.    JUNIOR MAZARIEGOS MD                Clinical Quality Measure: Blood Pressure Screening     Your blood pressure was checked while you were in the emergency department today. The last reading we obtained was  BP: 112/69 . Please read the guidelines below about what these numbers mean and what you should do about them.  If your systolic blood pressure (the top number) is less than 120 and your diastolic blood pressure (the bottom number) is less than 80, then your blood pressure is normal. There is nothing more that you need to do about it.  If your systolic blood pressure (the top number) is 120-139 or your diastolic blood pressure (the bottom number) is 80-89, your blood pressure may be higher than it should be. You should have your blood pressure rechecked within a year by a primary care provider.  If your systolic blood pressure (the top number) is 140 or greater or your diastolic blood pressure (the bottom number) is 90 or greater, you may have high blood pressure. High blood pressure is treatable, but if left untreated over time it can put you at risk for heart attack, stroke, or kidney failure. You should have your blood pressure rechecked by a primary care provider within the next 4 weeks.  If your provider in the emergency department today gave you specific instructions to follow-up with your doctor or provider even sooner than that, you should follow that instruction and not wait for up to 4  "weeks for your follow-up visit.        Thank you for choosing [facility]       Thank you for choosing [facility] for your care. Our goal is always to provide you with excellent care. Hearing back from our patients is one way we can continue to improve our services. Please take a few minutes to complete the written survey that you may receive in the mail after you visit with us. Thank you!        [employer] Information     [employer] lets you send messages to your doctor, view your test results, renew your prescriptions, schedule appointments and more. To sign up, go to [email or web address] . Click on \"Log in\" on the left side of the screen, which will take you to the Welcome page. Then click on \"Sign up Now\" on the right side of the page.     You will be asked to enter the access code listed below, as well as some personal information. Please follow the directions to create your username and password.     Your access code is: [email or web address]  Expires: 10/19/2018  6:42 AM     Your access code will  in 90 days. If you need help or a new code, please call your [facility] clinic or [phone].        Care EveryWhere ID     This is your Care EveryWhere ID. This could be used by other organizations to access your [facility] medical records  [id number]        Equal Access to Services     BAILEE WEBB : Anthony Carson, waaxda luqadaha, qaybta kaalmada adecourtneyyada, seun keane. So [facility] [phone].    ATENCIÓN: Si habla español, tiene a zazueta disposición servicios gratuitos de asistencia lingüística. Llame al [phone].    We comply with applicable federal civil rights laws and [place] laws. We do not discriminate on the basis of race, color, national origin, age, disability, sex, sexual orientation, or gender identity.            After Visit Summary       This is your record. Keep this with you and show to your community pharmacist(s) and doctor(s) at your next visit.                "

## 2018-07-23 LAB — INTERPRETATION ECG - MUSE: NORMAL

## 2021-06-09 ENCOUNTER — OFFICE VISIT (OUTPATIENT)
Dept: URGENT CARE | Facility: URGENT CARE | Age: 31
End: 2021-06-09
Payer: COMMERCIAL

## 2021-06-09 VITALS
HEART RATE: 84 BPM | SYSTOLIC BLOOD PRESSURE: 118 MMHG | OXYGEN SATURATION: 98 % | BODY MASS INDEX: 24.3 KG/M2 | TEMPERATURE: 98.1 F | WEIGHT: 174.2 LBS | DIASTOLIC BLOOD PRESSURE: 80 MMHG

## 2021-06-09 DIAGNOSIS — H69.91 DYSFUNCTION OF RIGHT EUSTACHIAN TUBE: Primary | ICD-10-CM

## 2021-06-09 PROCEDURE — 99203 OFFICE O/P NEW LOW 30 MIN: CPT | Performed by: NURSE PRACTITIONER

## 2021-06-09 NOTE — PATIENT INSTRUCTIONS
Increase rest and fluids. Tylenol and/or Ibuprofen for comfort. If your symptoms worsen or do not resolve follow up with your primary care provider in 1 week and sooner if needed.      Mucinex 600-1200 mg 12 hour formula for ear, head and chest congestion.  It can also thin post nasal drip which can cause a cough and sore throat.    Indications for emergent return to emergency department discussed with patient, who verbalized good understanding and agreement.  Patient understands the limitations of today's evaluation.           Patient Education     Earache, No Infection (Adult)   Earaches can happen without an infection. They can occur when air and fluid build up behind the eardrum. They may cause a feeling of fullness and discomfort. They may also impair hearing. This is called otitis media with effusion (OME) or serous otitis media. It means there is fluid in the middle ear. It is not the same as acute otitis media, which is often from an infection.  OME can happen when you have a cold if congestion blocks the passage that drains the middle ear. This passage is called the eustachian tube. OME may also occur with nasal allergies or after a bacterial infection in the middle ear. Other causes are:    Trauma    Improper cleaning of wax from the ear    Bacterial infection of the mastoid bone (mastoiditis)    Tumor    Jaw pain    Changes in pressure, such as from flying or scuba diving    The pain or discomfort may come and go. You may hear clicking or popping sounds when you chew or swallow. You may feel that your balance is off. Or you may hear ringing in the ear.  It often takes from several weeks up to 3 months for the fluid to clear on its own. Oral pain relievers and ear drops help if there is pain. Decongestants and antihistamines sometimes help. Antibiotics don't help since there is no infection. Your healthcare provider may give you a nasal spray to help reduce swelling in the nose and eustachian tube. This  can allow the ear to drain.  If your OME doesn't get better after 3 months, surgery may be used to drain the fluid. A small tube may also be put in the eardrum to help with drainage.  Because the middle ear fluid can become infected, watch for signs of an infection. These may develop later. They may include increased ear pain, fever, or drainage from the ear.  Home care  These home-care tips will help you take care of yourself:    You may use over-the-counter medicine as directed by your healthcare provider to control pain, unless medicine was prescribed. If you have chronic liver or kidney disease or ever had a stomach ulcer or GI bleeding, talk with your healthcare provider before using any medicines.    Aspirin should never be used in anyone younger than age 18 who has a fever. It may cause severe liver damage.    Ask your healthcare provider if you may use over-the-counter decongestants such as phenylephrine or pseudoephedrine. Keep in mind they are not always helpful.    Talk with your healthcare provider about using nasal spray decongestants. Don't use them for more than 3 days, or as directed by your healthcare provider. Longer use can make congestion worse. Prescription nasal sprays from your healthcare provider don't often have such restrictions.    Antihistamines may help if you are also having allergy symptoms.    You may use medicines such as guaifenesin to thin mucus and help with drainage.  Follow-up care  Follow up with your healthcare provider or as advised if you are not feeling better after 3 days.  When to seek medical advice  Call your healthcare provider right away if any of these occur:    Ear pain that gets worse or that does not start to get better     Fever of 100.4 F (38 C) or higher, or as directed by your healthcare provider    Fluid or blood draining from the ear    Headache or sinus pain    Stiff neck    Unusual drowsiness or confusion  Aileen last reviewed this educational content on  12/1/2019 2000-2021 [employer]. All rights reserved. This information is not intended as a substitute for professional medical care. Always follow your healthcare professional's instructions.

## 2021-06-09 NOTE — PROGRESS NOTES
Assessment & Plan   Problem List Items Addressed This Visit     None      Visit Diagnoses     Dysfunction of right eustachian tube    -  Primary             20 minutes spent on the date of the encounter doing chart review, history and exam, documentation and further activities per the note      Patient Instructions   Increase rest and fluids. Tylenol and/or Ibuprofen for comfort. If your symptoms worsen or do not resolve follow up with your primary care provider in 1 week and sooner if needed.      Mucinex 600-1200 mg 12 hour formula for ear, head and chest congestion.  It can also thin post nasal drip which can cause a cough and sore throat.    Indications for emergent return to emergency department discussed with patient, who verbalized good understanding and agreement.  Patient understands the limitations of today's evaluation.           Patient Education     Earache, No Infection (Adult)   Earaches can happen without an infection. They can occur when air and fluid build up behind the eardrum. They may cause a feeling of fullness and discomfort. They may also impair hearing. This is called otitis media with effusion (OME) or serous otitis media. It means there is fluid in the middle ear. It is not the same as acute otitis media, which is often from an infection.  OME can happen when you have a cold if congestion blocks the passage that drains the middle ear. This passage is called the eustachian tube. OME may also occur with nasal allergies or after a bacterial infection in the middle ear. Other causes are:    Trauma    Improper cleaning of wax from the ear    Bacterial infection of the mastoid bone (mastoiditis)    Tumor    Jaw pain    Changes in pressure, such as from flying or scuba diving    The pain or discomfort may come and go. You may hear clicking or popping sounds when you chew or swallow. You may feel that your balance is off. Or you may hear ringing in the ear.  It often takes from several weeks  up to 3 months for the fluid to clear on its own. Oral pain relievers and ear drops help if there is pain. Decongestants and antihistamines sometimes help. Antibiotics don't help since there is no infection. Your healthcare provider may give you a nasal spray to help reduce swelling in the nose and eustachian tube. This can allow the ear to drain.  If your OME doesn't get better after 3 months, surgery may be used to drain the fluid. A small tube may also be put in the eardrum to help with drainage.  Because the middle ear fluid can become infected, watch for signs of an infection. These may develop later. They may include increased ear pain, fever, or drainage from the ear.  Home care  These home-care tips will help you take care of yourself:    You may use over-the-counter medicine as directed by your healthcare provider to control pain, unless medicine was prescribed. If you have chronic liver or kidney disease or ever had a stomach ulcer or GI bleeding, talk with your healthcare provider before using any medicines.    Aspirin should never be used in anyone younger than age 18 who has a fever. It may cause severe liver damage.    Ask your healthcare provider if you may use over-the-counter decongestants such as phenylephrine or pseudoephedrine. Keep in mind they are not always helpful.    Talk with your healthcare provider about using nasal spray decongestants. Don't use them for more than 3 days, or as directed by your healthcare provider. Longer use can make congestion worse. Prescription nasal sprays from your healthcare provider don't often have such restrictions.    Antihistamines may help if you are also having allergy symptoms.    You may use medicines such as guaifenesin to thin mucus and help with drainage.  Follow-up care  Follow up with your healthcare provider or as advised if you are not feeling better after 3 days.  When to seek medical advice  Call your healthcare provider right away if any of  these occur:    Ear pain that gets worse or that does not start to get better     Fever of 100.4 F (38 C) or higher, or as directed by your healthcare provider    Fluid or blood draining from the ear    Headache or sinus pain    Stiff neck    Unusual drowsiness or confusion  Aileen last reviewed this educational content on 12/1/2019 2000-2021 [employer]. All rights reserved. This information is not intended as a substitute for professional medical care. Always follow your healthcare professional's instructions.               Return in about 1 week (around 6/16/2021), or if symptoms worsen or fail to improve, for Follow up with your primary care provider.    Kasey Heath, MARIO ALBERTO CNP  [facility]    Pattie Woods is a 30 year old who presents for the following health issues     HPI     Chief Complaint   Patient presents with     Tingling     in right ear.  Started yesterday.            Review of Systems   Constitutional, HEENT, cardiovascular, pulmonary, GI, , musculoskeletal, neuro, skin, endocrine and psych systems are negative, except as otherwise noted.      Objective    /80 (BP Location: Right arm, Patient Position: Sitting, Cuff Size: Adult Regular)   Pulse 84   Temp 98.1  F (36.7  C) (Tympanic)   Wt 79 kg (174 lb 3.2 oz)   SpO2 98%   BMI 24.30 kg/m    Body mass index is 24.3 kg/m .  Physical Exam   GENERAL: healthy, alert and no distress, nontoxic in appearance  EYES: Eyes grossly normal to inspection, PERRL and conjunctivae and sclerae normal  HENT: ear canals and TM's normal on left and retracted and dull but gray on right, nose and mouth without ulcers or lesions  NECK: no adenopathy, supple with full ROM  No results found for this or any previous visit (from the past 24 hour(s)).

## 2023-03-12 ENCOUNTER — APPOINTMENT (OUTPATIENT)
Dept: CT IMAGING | Facility: CLINIC | Age: 33
End: 2023-03-12
Attending: EMERGENCY MEDICINE
Payer: COMMERCIAL

## 2023-03-12 ENCOUNTER — APPOINTMENT (OUTPATIENT)
Dept: GENERAL RADIOLOGY | Facility: CLINIC | Age: 33
End: 2023-03-12
Attending: EMERGENCY MEDICINE
Payer: COMMERCIAL

## 2023-03-12 ENCOUNTER — HOSPITAL ENCOUNTER (EMERGENCY)
Facility: CLINIC | Age: 33
Discharge: HOME | End: 2023-03-13
Attending: EMERGENCY MEDICINE | Admitting: EMERGENCY MEDICINE
Payer: COMMERCIAL

## 2023-03-12 DIAGNOSIS — F10.920 ALCOHOLIC INTOXICATION WITHOUT COMPLICATION (H): ICD-10-CM

## 2023-03-12 DIAGNOSIS — H57.02 ANISOCORIA: ICD-10-CM

## 2023-03-12 LAB
ALBUMIN SERPL BCG-MCNC: 4.8 G/DL (ref 3.5–5.2)
ALCOHOL BREATH TEST: 0.4 (ref 0–0.01)
ALP SERPL-CCNC: 85 U/L (ref 40–129)
ALT SERPL W P-5'-P-CCNC: 19 U/L (ref 10–50)
ANION GAP SERPL CALCULATED.3IONS-SCNC: 15 MMOL/L (ref 7–15)
AST SERPL W P-5'-P-CCNC: 22 U/L (ref 10–50)
BASOPHILS # BLD AUTO: 0 10E3/UL (ref 0–0.2)
BASOPHILS NFR BLD AUTO: 0 %
BILIRUB SERPL-MCNC: 0.2 MG/DL
BUN SERPL-MCNC: 13.2 MG/DL (ref 6–20)
CALCIUM SERPL-MCNC: 9.2 MG/DL (ref 8.6–10)
CHLORIDE SERPL-SCNC: 106 MMOL/L (ref 98–107)
CREAT SERPL-MCNC: 0.88 MG/DL (ref 0.67–1.17)
DEPRECATED HCO3 PLAS-SCNC: 24 MMOL/L (ref 22–29)
EOSINOPHIL # BLD AUTO: 0 10E3/UL (ref 0–0.7)
EOSINOPHIL NFR BLD AUTO: 0 %
ERYTHROCYTE [DISTWIDTH] IN BLOOD BY AUTOMATED COUNT: 11.9 % (ref 10–15)
GFR SERPL CREATININE-BSD FRML MDRD: >90 ML/MIN/1.73M2
GLUCOSE SERPL-MCNC: 99 MG/DL (ref 70–99)
HCT VFR BLD AUTO: 42.8 % (ref 40–53)
HGB BLD-MCNC: 14.7 G/DL (ref 13.3–17.7)
IMM GRANULOCYTES # BLD: 0 10E3/UL
IMM GRANULOCYTES NFR BLD: 0 %
LYMPHOCYTES # BLD AUTO: 1.6 10E3/UL (ref 0.8–5.3)
LYMPHOCYTES NFR BLD AUTO: 25 %
MCH RBC QN AUTO: 29.8 PG (ref 26.5–33)
MCHC RBC AUTO-ENTMCNC: 34.3 G/DL (ref 31.5–36.5)
MCV RBC AUTO: 87 FL (ref 78–100)
MONOCYTES # BLD AUTO: 0.2 10E3/UL (ref 0–1.3)
MONOCYTES NFR BLD AUTO: 2 %
NEUTROPHILS # BLD AUTO: 4.6 10E3/UL (ref 1.6–8.3)
NEUTROPHILS NFR BLD AUTO: 73 %
NRBC # BLD AUTO: 0 10E3/UL
NRBC BLD AUTO-RTO: 0 /100
PLATELET # BLD AUTO: 325 10E3/UL (ref 150–450)
POTASSIUM SERPL-SCNC: 4 MMOL/L (ref 3.4–5.3)
PROT SERPL-MCNC: 7.7 G/DL (ref 6.4–8.3)
RBC # BLD AUTO: 4.93 10E6/UL (ref 4.4–5.9)
SARS-COV-2 RNA RESP QL NAA+PROBE: NEGATIVE
SODIUM SERPL-SCNC: 145 MMOL/L (ref 136–145)
WBC # BLD AUTO: 6.4 10E3/UL (ref 4–11)

## 2023-03-12 PROCEDURE — U0003 INFECTIOUS AGENT DETECTION BY NUCLEIC ACID (DNA OR RNA); SEVERE ACUTE RESPIRATORY SYNDROME CORONAVIRUS 2 (SARS-COV-2) (CORONAVIRUS DISEASE [COVID-19]), AMPLIFIED PROBE TECHNIQUE, MAKING USE OF HIGH THROUGHPUT TECHNOLOGIES AS DESCRIBED BY CMS-2020-01-R: HCPCS | Performed by: EMERGENCY MEDICINE

## 2023-03-12 PROCEDURE — 250N000013 HC RX MED GY IP 250 OP 250 PS 637: Performed by: EMERGENCY MEDICINE

## 2023-03-12 PROCEDURE — 70498 CT ANGIOGRAPHY NECK: CPT

## 2023-03-12 PROCEDURE — 93005 ELECTROCARDIOGRAM TRACING: CPT | Performed by: EMERGENCY MEDICINE

## 2023-03-12 PROCEDURE — 73130 X-RAY EXAM OF HAND: CPT | Mod: RT

## 2023-03-12 PROCEDURE — 70450 CT HEAD/BRAIN W/O DYE: CPT

## 2023-03-12 PROCEDURE — 82075 ASSAY OF BREATH ETHANOL: CPT | Performed by: EMERGENCY MEDICINE

## 2023-03-12 PROCEDURE — 99285 EMERGENCY DEPT VISIT HI MDM: CPT | Performed by: EMERGENCY MEDICINE

## 2023-03-12 PROCEDURE — 36415 COLL VENOUS BLD VENIPUNCTURE: CPT | Performed by: EMERGENCY MEDICINE

## 2023-03-12 PROCEDURE — 70496 CT ANGIOGRAPHY HEAD: CPT

## 2023-03-12 PROCEDURE — 250N000009 HC RX 250: Performed by: EMERGENCY MEDICINE

## 2023-03-12 PROCEDURE — 72125 CT NECK SPINE W/O DYE: CPT

## 2023-03-12 PROCEDURE — 250N000011 HC RX IP 250 OP 636: Performed by: EMERGENCY MEDICINE

## 2023-03-12 PROCEDURE — 85025 COMPLETE CBC W/AUTO DIFF WBC: CPT | Performed by: EMERGENCY MEDICINE

## 2023-03-12 PROCEDURE — C9803 HOPD COVID-19 SPEC COLLECT: HCPCS | Performed by: EMERGENCY MEDICINE

## 2023-03-12 PROCEDURE — 80053 COMPREHEN METABOLIC PANEL: CPT | Performed by: EMERGENCY MEDICINE

## 2023-03-12 PROCEDURE — 99285 EMERGENCY DEPT VISIT HI MDM: CPT | Mod: 25 | Performed by: EMERGENCY MEDICINE

## 2023-03-12 RX ORDER — IOPAMIDOL 755 MG/ML
100 INJECTION, SOLUTION INTRAVASCULAR ONCE
Status: COMPLETED | OUTPATIENT
Start: 2023-03-12 | End: 2023-03-12

## 2023-03-12 RX ORDER — OLANZAPINE 10 MG/1
10 TABLET, ORALLY DISINTEGRATING ORAL ONCE
Status: COMPLETED | OUTPATIENT
Start: 2023-03-12 | End: 2023-03-12

## 2023-03-12 RX ADMIN — SODIUM CHLORIDE 40 ML: 9 INJECTION, SOLUTION INTRAVENOUS at 19:05

## 2023-03-12 RX ADMIN — NICOTINE POLACRILEX 4 MG: 4 GUM, CHEWING BUCCAL at 21:25

## 2023-03-12 RX ADMIN — IOPAMIDOL 75 ML: 755 INJECTION, SOLUTION INTRAVENOUS at 19:04

## 2023-03-12 RX ADMIN — NICOTINE POLACRILEX 4 MG: 4 GUM, CHEWING BUCCAL at 18:55

## 2023-03-12 RX ADMIN — OLANZAPINE 10 MG: 10 TABLET, ORALLY DISINTEGRATING ORAL at 21:25

## 2023-03-12 ASSESSMENT — ACTIVITIES OF DAILY LIVING (ADL)
ADLS_ACUITY_SCORE: 35

## 2023-03-12 NOTE — ED PROVIDER NOTES
"    [facility] EMERGENCY DEPARTMENT ([facility])    3/12/23      ED PROVIDER NOTE    History   No chief complaint on file.    HPI  Chuck Magdaleno is a 32 year old male with past medical history significant for alcohol use disorder, withdrawal seizures and DTs, ADHD, anxiety, depression who presents to the ED for alcohol use and asymmetric pupils.  History is limited secondary to patient's alcohol intoxication.  He reports that he has noted that his pupils are asymmetric for his \"whole life\".  He reports that he has had numerous prior concussions from playing hockey.  His last fall was in 2017.  He does report a headache that he reports is always present.  He reports long-term nausea and vomiting.  He denies any vision changes.  He denies using any recent eyedrops.  Denies any eye pain.  He does report frequent alcohol use and states he is drinking \"9 drinks an hour\".  He denies other drug use.  He denies any fevers, chest pain, abdominal pain or shortness of breath.    Per chart review, patient was most recently admitted to detox at [facility] 01/05/2023 - 01/07/2023. He was discharged on multivitamin, thiamine, folate, Vistaril and Zoloft and discharged to a  treatment center.      Physical Exam   BP: 109/80  Pulse: 73  Temp: 97.7  F (36.5  C)  Resp: 18  Height: 177.8 cm (5' 10\")  Weight: 81.9 kg (180 lb 9.6 oz)  SpO2: 96 %  Physical Exam  General: patient is alert but tangential and confused on recent events  Head: atraumatic and normocephalic   EENT: moist mucus membranes without tonsillar erythema or exudates, left pupil is 2 mm, right pupil is 4 mm.  Both are reactive, extraocular movements intact, no ptosis  Neck: supple with no midline cervical spine tenderness  Cardiovascular: regular rate and rhythm, extremities warm and well perfused, no lower extremity edema  Pulmonary: lungs clear to auscultation bilaterally   Abdomen: soft, non-tender   Musculoskeletal: normal range of motion, " reports tenderness to palpation on the dorsum of the right hand along the third fourth and fifth metacarpals, no gross deformity, intact   Neurological: alert but confused, slurring speech, moving all extremities symmetrically, asymmetric pupils, extraocular movements intact, no facial droop, strength 5/5 and symmetric in , elbow flexion/extension, hip flexion/extension, knee flexion/extension and ankle plantar/dorsiflexion, sensation to light touch is reportedly diminished in the bilateral upper extremities, intact in the lower extremities   skin: warm, dry   Psych: Speech is slurred, normal in rate and tone, tangential thought process, intermittently appears anxious      ED Course, Procedures, & Data      Procedures                   No results found for any visits on 03/12/23.  Medications - No data to display  Labs Ordered and Resulted from Time of ED Arrival to Time of ED Departure - No data to display  No orders to display          Critical care was not performed.     Medical Decision Making  The patient's presentation was of moderate complexity (an undiagnosed new problem with uncertain diagnosis).    The patient's evaluation involved:  review of external note(s) from 1 sources (hospital dc summary )  ordering and/or review of 3+ test(s) in this encounter (see separate area of note for details)  discussion of management or test interpretation with another health professional (see separate area of note for details)    The patient's management necessitated moderate risk (prescription drug management including medications given in the ED) and high risk (a decision regarding hospitalization).      Assessment & Plan    Mr. Magdaleno is a 32 year old male with past medical history significant for alcohol use disorder, withdrawal seizures and DTs, ADHD, anxiety, depression who presents to the ED for alcohol use and asymmetric pupils.   He is hemodynamically stable, afebrile and in no respiratory distress.   He does appear intoxicated and breath alcohol is 0.40.  On exam he has distinctly asymmetric pupils which he reports he has had his whole life however on chart review of two recent visits this year pupils are noted to be equal in size.  He does not have any ptosis on exam and low suspicion for Tequila syndrome.  The affected pupil does seem to be the dilated pupil.  He does have full extraocular movements and lower suspicion for 3rd nerve palsy however is a consideration.  He did have a head CT and CTA which demonstrate no aneurysm or intracranial hemorrhage given his reports of previous falls.  I did discuss with ophthalmology who has seen the patient and vision is intact.  Unclear the etiology but certainly may be secondary to prior head trauma.  They will plan to follow-up with the patient in clinic.  During his ED stay the asymmetry actually did resolve and was noted to have symmetric pupils prior to discharge.  Laboratory evaluation demonstrates no significant electrolyte derangement or elevation in LFTs.  Glucose is 127.  He did report some pain in his right hand to palpation and did have an x-ray which shows no evidence of fracture.  Patient initially requested detox however later changed his mind.  He did become quite agitated while in the ED and received 10 mg of oral Zyprexa.  He was observed in the emergency department until clinically sober.  He is currently waiting for a friend to come pick him up.  Will plan to discharge with outpatient chemical dependency resources and ophthalmology follow-up.      I have reviewed the nursing notes. I have reviewed the findings, diagnosis, plan and need for follow up with the patient.    New Prescriptions    No medications on file       Final diagnoses:   Alcoholic intoxication without complication (H)   Anisocoria       Jazmin Dennis MD  [facility] EMERGENCY DEPARTMENT  3/12/2023     Jazmin Dennis MD  03/12/23 [id number]       Jazmin Dennis  MD Izzy  03/13/23 0028

## 2023-03-12 NOTE — ED NOTES
AIDET: done    W/ pt: clthing    Rack: jacket, shoes, vape pen    Security: none    No wallet    No Cellphone

## 2023-03-12 NOTE — ED TRIAGE NOTES
Triage Assessment     Row Name 03/12/23 7404       Triage Assessment (Adult)    Airway WDL WDL       Respiratory WDL    Respiratory WDL WDL       Skin Circulation/Temperature WDL    Skin Circulation/Temperature WDL WDL       Cardiac WDL    Cardiac WDL WDL       Peripheral/Neurovascular WDL    Peripheral Neurovascular WDL WDL       Cognitive/Neuro/Behavioral WDL    Cognitive/Neuro/Behavioral WDL WDL

## 2023-03-12 NOTE — ED NOTES
Writer noticed patient's left pupil is dilated while right pupil appears to be normal size, patient is too intoxicated to report if this is a normal finding or if they had a recent fall. MD aware.

## 2023-03-13 VITALS
RESPIRATION RATE: 18 BRPM | HEIGHT: 70 IN | SYSTOLIC BLOOD PRESSURE: 109 MMHG | HEART RATE: 73 BPM | WEIGHT: 180.6 LBS | BODY MASS INDEX: 25.86 KG/M2 | DIASTOLIC BLOOD PRESSURE: 80 MMHG | TEMPERATURE: 97.7 F | OXYGEN SATURATION: 96 %

## 2023-03-13 LAB
ATRIAL RATE - MUSE: 78 BPM
DIASTOLIC BLOOD PRESSURE - MUSE: NORMAL MMHG
INTERPRETATION ECG - MUSE: NORMAL
P AXIS - MUSE: 74 DEGREES
PR INTERVAL - MUSE: 160 MS
QRS DURATION - MUSE: 90 MS
QT - MUSE: 392 MS
QTC - MUSE: 446 MS
R AXIS - MUSE: 61 DEGREES
SYSTOLIC BLOOD PRESSURE - MUSE: NORMAL MMHG
T AXIS - MUSE: 72 DEGREES
VENTRICULAR RATE- MUSE: 78 BPM

## 2023-03-13 ASSESSMENT — ACTIVITIES OF DAILY LIVING (ADL)
ADLS_ACUITY_SCORE: 35

## 2023-03-13 NOTE — CONSULTS
OPHTHALMOLOGY CONSULT NOTE  03/12/23    Patient: Chuck Magdaleno  Consulted by: ED  Reason for Consult: Anisocoria    HISTORY OF PRESENTING ILLNESS:     Chuck Magdaleno is a 32 year old male who presents to ED intoxicated, upon arrival ED team noted anisocoria.    History is limited due to patient being significantly intoxicated. He states that his pupils have always been different sizes for many years due to several concussions from playing hockey. Unable to give any additional information.    Review of systems were otherwise negative except for that which has been stated above.      OCULAR/MEDICAL/SURGICAL HISTORIES:     Past Ocular History:  Unknown due to patient intoxication    Family History:  Unknown due to patient intoxication    Social History:  Unknown due to patient intoxication    History reviewed. No pertinent past medical history.    Past Surgical History:   Procedure Laterality Date     APPENDECTOMY OPEN         EXAMINATION:     Base Eye Exam     Visual Acuity (Snellen - Linear)       Right Left    Near sc 20/25+2 20/25+2          Tonometry (Tonopen, 7:44 PM)       Right Left    Pressure 10 12          Pupils       Dark Light Shape React APD    Right 4mm 2mm Round Brisk None    Left 8mm 4mm Round Brisk None   Above measurements represent pupillary reaction to direct light. However, anisocoria greater in light room vs dark room despite their reactivity.           Visual Fields       Left Right     Full Full          Extraocular Movement       Right Left     Full, Ortho, Nystagmus Full, Ortho, Nystagmus   Patient acutely intoxicated           Dilation     Both eyes: 1.0% Mydriacyl @ 7:44 PM            Slit Lamp and Fundus Exam     External Exam       Right Left    External Normal Normal          Portable Slit Lamp Exam       Right Left    Lids/Lashes Normal Normal    Conjunctiva/Sclera White and quiet White and quiet    Cornea Clear Clear    Anterior Chamber Deep and quiet Deep and quiet    Iris  4mm in lit room, 6mm in dark room 8mm in lit room, 8mm in dark room    Lens Clear Clear          Fundus Exam     Patient refused further exam                 Pupils in light room     Pupils in dark room    Imaging quality difficult/poor due to patient participation    Labs/Studies/Imaging Performed:  Alcohol breath test 0.40    Head CT/Head CTA/Neck CTA/CT C spine (3/12/23) Preliminary read:  IMPRESSION:   HEAD CT:  1.  No acute intracranial process.     HEAD CTA:   1.  No large vessel occlusion or hemodynamically significant stenosis.     NECK CTA:  1.  No large vessel occlusion or hemodynamically significant stenosis.     CERVICAL SPINE CT:  1.  No CT evidence for acute fracture or post traumatic subluxation.     ASSESSMENT/PLAN:     Chuck Mgadaleno is a 32 year old male who presents with     # Anisocoria, left eye > right eye  Patient with anisocoria, greater in light than dark. Patient states his pupils have always been different sizes since having multiple concussions while playing hockey.   - Patient acutely intoxicated with 0.40 alcohol breath test  - No ptosis, restrictions in EOMs, low concern for CN3 palsy  - Pupils are both reactive despite anisocoria with no APD  - Patient refused any additional examination so further testing including pilocarpine or dilated exam could not be completed  - Etiology likely due to history of traumatic iris damage    RECOMMENDATIONS:  - CT/CTA head and neck preliminary read normal  - If head imaging normal, can follow up in [facility] in 2-4 weeks (we will arrange)    It is our pleasure to participate in this patient's care and treatment. Please contact us with any further questions or concerns.      Patient discussed with senior resident Dr. Cabrera.    Gilda Merchant MD  Resident Physician, PGY-2  Department of Ophthalmology

## 2023-03-13 NOTE — DISCHARGE INSTRUCTIONS
Please make an appointment to follow up with Eye Clinic (phone: [phone]) as soon as possible.    If at any point you wish to pursue detox from alcohol return to the emergency department for reevaluation.  You may also follow-up with the below listed chemical dependency resources for further assistance in alcohol use.    Please use the below resources and your primary care physician to safely cease alcohol and/or substance use.     Return to the ED if you are having any urgent/life-threatening concerns.     DISCHARGE RESOURCES:  [facility] [phone] (detox), [phone] (outpatient & [facility])    SMART Recovery - self management for addiction recovery:  www.[place].org    [facility] ~ A Health Crisis Resource & Support Center: [phone].  [facility] [phone]   Substance Abuse and Mental Health Services (www.samhsa.gov)  Harm Reduction Coalition (www.Harmreduction.org)  [place] Opioid Prevention Coalition: www.opioidcoalition.org  Poison control 1-545.139.4315       Sober Support Group Information:  AA/NA & Sponsor/Support  Alcoholics Anonymous (www.alcoholics-anonymous.org)   AA Intergroup service office in [place] (http://www.aastpaul.org/) [phone]  AA Intergroup service office in [facility]: [phone]. (http://www.a[place].org/)  Secular AA (www.secularaa.org)  Narcotics Anonymous (www.nam[place].org) (505) 850-1535   Sober Fun Activities: www.sober-activities.[employer].[place]//[facility] Recovery Connection ([facility])  [facility] connects people seeking recovery to resources that help foster and sustain long-term recovery.  Whether you are seeking resources for treatment, transportation, housing, job training, education, health care or other pathways to recovery, [facility] is a great place to start.   Phone: [phone]. www.[employer] (Great listing of all types of recovery and non-recovery related  resources)?

## 2023-09-26 ENCOUNTER — HOSPITAL ENCOUNTER (INPATIENT)
Facility: CLINIC | Age: 33
LOS: 3 days | Discharge: HOME | DRG: 896 | End: 2023-09-29
Attending: EMERGENCY MEDICINE | Admitting: STUDENT IN AN ORGANIZED HEALTH CARE EDUCATION/TRAINING PROGRAM
Payer: COMMERCIAL

## 2023-09-26 DIAGNOSIS — K22.6 MALLORY-WEISS TEAR: ICD-10-CM

## 2023-09-26 DIAGNOSIS — E88.89 ALCOHOLIC KETOSIS (H): ICD-10-CM

## 2023-09-26 DIAGNOSIS — F10.230 ALCOHOL DEPENDENCE WITH WITHDRAWAL, UNCOMPLICATED (H): Primary | ICD-10-CM

## 2023-09-26 DIAGNOSIS — F10.239 ALCOHOL DEPENDENCE WITH WITHDRAWAL WITH COMPLICATION (H): ICD-10-CM

## 2023-09-26 DIAGNOSIS — K92.0 BLOODY EMESIS: ICD-10-CM

## 2023-09-26 PROBLEM — Z79.899 CONTROLLED SUBSTANCE AGREEMENT SIGNED: Status: ACTIVE | Noted: 2019-10-09

## 2023-09-26 PROBLEM — F41.9 ANXIETY: Status: ACTIVE | Noted: 2023-01-24

## 2023-09-26 LAB
ALBUMIN SERPL BCG-MCNC: 5.5 G/DL (ref 3.5–5.2)
ALP SERPL-CCNC: 77 U/L (ref 40–129)
ALT SERPL W P-5'-P-CCNC: 29 U/L (ref 0–70)
ANION GAP SERPL CALCULATED.3IONS-SCNC: 26 MMOL/L (ref 7–15)
AST SERPL W P-5'-P-CCNC: 55 U/L (ref 0–45)
ATRIAL RATE - MUSE: 89 BPM
B-OH-BUTYR SERPL-SCNC: 3.7 MMOL/L
BILIRUB DIRECT SERPL-MCNC: <0.2 MG/DL (ref 0–0.3)
BILIRUB SERPL-MCNC: 1 MG/DL
BUN SERPL-MCNC: 18.9 MG/DL (ref 6–20)
CALCIUM SERPL-MCNC: 10.7 MG/DL (ref 8.6–10)
CHLORIDE SERPL-SCNC: 85 MMOL/L (ref 98–107)
CREAT SERPL-MCNC: 0.94 MG/DL (ref 0.67–1.17)
DEPRECATED HCO3 PLAS-SCNC: 29 MMOL/L (ref 22–29)
DIASTOLIC BLOOD PRESSURE - MUSE: NORMAL MMHG
EGFRCR SERPLBLD CKD-EPI 2021: >90 ML/MIN/1.73M2
ERYTHROCYTE [DISTWIDTH] IN BLOOD BY AUTOMATED COUNT: 13 % (ref 10–15)
ETHANOL SERPL-MCNC: <0.01 G/DL
GLUCOSE SERPL-MCNC: 121 MG/DL (ref 70–99)
HCT VFR BLD AUTO: 45.7 % (ref 40–53)
HGB BLD-MCNC: 15.9 G/DL (ref 13.3–17.7)
HOLD SPECIMEN: NORMAL
HOLD SPECIMEN: NORMAL
INTERPRETATION ECG - MUSE: NORMAL
LIPASE SERPL-CCNC: 34 U/L (ref 13–60)
MAGNESIUM SERPL-MCNC: 1.9 MG/DL (ref 1.7–2.3)
MCH RBC QN AUTO: 29.5 PG (ref 26.5–33)
MCHC RBC AUTO-ENTMCNC: 34.8 G/DL (ref 31.5–36.5)
MCV RBC AUTO: 85 FL (ref 78–100)
P AXIS - MUSE: 65 DEGREES
PHOSPHATE SERPL-MCNC: 2.3 MG/DL (ref 2.5–4.5)
PLATELET # BLD AUTO: 305 10E3/UL (ref 150–450)
POTASSIUM SERPL-SCNC: 3.6 MMOL/L (ref 3.4–5.3)
PR INTERVAL - MUSE: 152 MS
PROT SERPL-MCNC: 9.1 G/DL (ref 6.4–8.3)
QRS DURATION - MUSE: 96 MS
QT - MUSE: 406 MS
QTC - MUSE: 493 MS
R AXIS - MUSE: 57 DEGREES
RBC # BLD AUTO: 5.39 10E6/UL (ref 4.4–5.9)
SODIUM SERPL-SCNC: 140 MMOL/L (ref 136–145)
SYSTOLIC BLOOD PRESSURE - MUSE: NORMAL MMHG
T AXIS - MUSE: 49 DEGREES
VENTRICULAR RATE- MUSE: 89 BPM
WBC # BLD AUTO: 13.1 10E3/UL (ref 4–11)

## 2023-09-26 PROCEDURE — 80053 COMPREHEN METABOLIC PANEL: CPT | Performed by: EMERGENCY MEDICINE

## 2023-09-26 PROCEDURE — 84100 ASSAY OF PHOSPHORUS: CPT | Performed by: EMERGENCY MEDICINE

## 2023-09-26 PROCEDURE — 96361 HYDRATE IV INFUSION ADD-ON: CPT

## 2023-09-26 PROCEDURE — C9113 INJ PANTOPRAZOLE SODIUM, VIA: HCPCS | Mod: JZ | Performed by: PHYSICIAN ASSISTANT

## 2023-09-26 PROCEDURE — 82040 ASSAY OF SERUM ALBUMIN: CPT | Performed by: STUDENT IN AN ORGANIZED HEALTH CARE EDUCATION/TRAINING PROGRAM

## 2023-09-26 PROCEDURE — 36415 COLL VENOUS BLD VENIPUNCTURE: CPT | Performed by: EMERGENCY MEDICINE

## 2023-09-26 PROCEDURE — 250N000011 HC RX IP 250 OP 636: Mod: JZ | Performed by: PHYSICIAN ASSISTANT

## 2023-09-26 PROCEDURE — 250N000013 HC RX MED GY IP 250 OP 250 PS 637: Performed by: PHYSICIAN ASSISTANT

## 2023-09-26 PROCEDURE — HZ2ZZZZ DETOXIFICATION SERVICES FOR SUBSTANCE ABUSE TREATMENT: ICD-10-PCS | Performed by: STUDENT IN AN ORGANIZED HEALTH CARE EDUCATION/TRAINING PROGRAM

## 2023-09-26 PROCEDURE — 96376 TX/PRO/DX INJ SAME DRUG ADON: CPT

## 2023-09-26 PROCEDURE — 250N000011 HC RX IP 250 OP 636: Performed by: STUDENT IN AN ORGANIZED HEALTH CARE EDUCATION/TRAINING PROGRAM

## 2023-09-26 PROCEDURE — 85027 COMPLETE CBC AUTOMATED: CPT | Performed by: EMERGENCY MEDICINE

## 2023-09-26 PROCEDURE — 258N000003 HC RX IP 258 OP 636: Performed by: STUDENT IN AN ORGANIZED HEALTH CARE EDUCATION/TRAINING PROGRAM

## 2023-09-26 PROCEDURE — 258N000003 HC RX IP 258 OP 636: Performed by: PHYSICIAN ASSISTANT

## 2023-09-26 PROCEDURE — 93005 ELECTROCARDIOGRAM TRACING: CPT

## 2023-09-26 PROCEDURE — 82077 ASSAY SPEC XCP UR&BREATH IA: CPT | Performed by: EMERGENCY MEDICINE

## 2023-09-26 PROCEDURE — 250N000013 HC RX MED GY IP 250 OP 250 PS 637: Performed by: STUDENT IN AN ORGANIZED HEALTH CARE EDUCATION/TRAINING PROGRAM

## 2023-09-26 PROCEDURE — 250N000011 HC RX IP 250 OP 636: Mod: JZ | Performed by: STUDENT IN AN ORGANIZED HEALTH CARE EDUCATION/TRAINING PROGRAM

## 2023-09-26 PROCEDURE — 250N000013 HC RX MED GY IP 250 OP 250 PS 637: Performed by: EMERGENCY MEDICINE

## 2023-09-26 PROCEDURE — 120N000001 HC R&B MED SURG/OB

## 2023-09-26 PROCEDURE — 82010 KETONE BODYS QUAN: CPT | Performed by: STUDENT IN AN ORGANIZED HEALTH CARE EDUCATION/TRAINING PROGRAM

## 2023-09-26 PROCEDURE — 83735 ASSAY OF MAGNESIUM: CPT | Performed by: EMERGENCY MEDICINE

## 2023-09-26 PROCEDURE — 99285 EMERGENCY DEPT VISIT HI MDM: CPT

## 2023-09-26 PROCEDURE — 96375 TX/PRO/DX INJ NEW DRUG ADDON: CPT

## 2023-09-26 PROCEDURE — 96374 THER/PROPH/DIAG INJ IV PUSH: CPT

## 2023-09-26 PROCEDURE — 83690 ASSAY OF LIPASE: CPT | Performed by: STUDENT IN AN ORGANIZED HEALTH CARE EDUCATION/TRAINING PROGRAM

## 2023-09-26 PROCEDURE — C9113 INJ PANTOPRAZOLE SODIUM, VIA: HCPCS | Mod: JZ | Performed by: EMERGENCY MEDICINE

## 2023-09-26 PROCEDURE — 99222 1ST HOSP IP/OBS MODERATE 55: CPT | Performed by: PHYSICIAN ASSISTANT

## 2023-09-26 PROCEDURE — 250N000011 HC RX IP 250 OP 636: Performed by: EMERGENCY MEDICINE

## 2023-09-26 RX ORDER — LORAZEPAM 1 MG/1
1-2 TABLET ORAL EVERY 30 MIN PRN
Status: DISCONTINUED | OUTPATIENT
Start: 2023-09-26 | End: 2023-09-29 | Stop reason: HOSPADM

## 2023-09-26 RX ORDER — FLUMAZENIL 0.1 MG/ML
0.2 INJECTION, SOLUTION INTRAVENOUS
Status: DISCONTINUED | OUTPATIENT
Start: 2023-09-26 | End: 2023-09-29 | Stop reason: HOSPADM

## 2023-09-26 RX ORDER — SERTRALINE HYDROCHLORIDE 100 MG/1
150 TABLET, FILM COATED ORAL DAILY
Status: ON HOLD | COMMUNITY
End: 2023-11-11

## 2023-09-26 RX ORDER — MULTIPLE VITAMINS W/ MINERALS TAB 9MG-400MCG
1 TAB ORAL DAILY
Status: DISCONTINUED | OUTPATIENT
Start: 2023-09-27 | End: 2023-09-29 | Stop reason: HOSPADM

## 2023-09-26 RX ORDER — LORAZEPAM 2 MG/ML
2 INJECTION INTRAMUSCULAR ONCE
Status: COMPLETED | OUTPATIENT
Start: 2023-09-26 | End: 2023-09-26

## 2023-09-26 RX ORDER — AMOXICILLIN 250 MG
1 CAPSULE ORAL 2 TIMES DAILY PRN
Status: DISCONTINUED | OUTPATIENT
Start: 2023-09-26 | End: 2023-09-29 | Stop reason: HOSPADM

## 2023-09-26 RX ORDER — GABAPENTIN 300 MG/1
300 CAPSULE ORAL EVERY 8 HOURS
Status: DISCONTINUED | OUTPATIENT
Start: 2023-10-01 | End: 2023-09-29 | Stop reason: HOSPADM

## 2023-09-26 RX ORDER — GABAPENTIN 100 MG/1
100 CAPSULE ORAL EVERY 8 HOURS
Status: DISCONTINUED | OUTPATIENT
Start: 2023-10-03 | End: 2023-09-29 | Stop reason: HOSPADM

## 2023-09-26 RX ORDER — ACETAMINOPHEN 325 MG/1
650 TABLET ORAL EVERY 6 HOURS PRN
Status: DISCONTINUED | OUTPATIENT
Start: 2023-09-26 | End: 2023-09-29 | Stop reason: HOSPADM

## 2023-09-26 RX ORDER — MULTIPLE VITAMINS W/ MINERALS TAB 9MG-400MCG
1 TAB ORAL ONCE
Status: COMPLETED | OUTPATIENT
Start: 2023-09-26 | End: 2023-09-26

## 2023-09-26 RX ORDER — ONDANSETRON 2 MG/ML
4 INJECTION INTRAMUSCULAR; INTRAVENOUS ONCE
Status: COMPLETED | OUTPATIENT
Start: 2023-09-26 | End: 2023-09-26

## 2023-09-26 RX ORDER — AMOXICILLIN 250 MG
2 CAPSULE ORAL 2 TIMES DAILY PRN
Status: DISCONTINUED | OUTPATIENT
Start: 2023-09-26 | End: 2023-09-29 | Stop reason: HOSPADM

## 2023-09-26 RX ORDER — PROCHLORPERAZINE 25 MG
25 SUPPOSITORY, RECTAL RECTAL EVERY 12 HOURS PRN
Status: DISCONTINUED | OUTPATIENT
Start: 2023-09-26 | End: 2023-09-29 | Stop reason: HOSPADM

## 2023-09-26 RX ORDER — LORAZEPAM 0.5 MG/1
2 TABLET ORAL ONCE
Status: DISCONTINUED | OUTPATIENT
Start: 2023-09-26 | End: 2023-09-26

## 2023-09-26 RX ORDER — LORAZEPAM 2 MG/ML
1-2 INJECTION INTRAMUSCULAR EVERY 30 MIN PRN
Status: DISCONTINUED | OUTPATIENT
Start: 2023-09-26 | End: 2023-09-29 | Stop reason: HOSPADM

## 2023-09-26 RX ORDER — TRAZODONE HYDROCHLORIDE 100 MG/1
100 TABLET ORAL
Status: DISCONTINUED | OUTPATIENT
Start: 2023-09-26 | End: 2023-09-29 | Stop reason: HOSPADM

## 2023-09-26 RX ORDER — OLANZAPINE 5 MG/1
5-10 TABLET, ORALLY DISINTEGRATING ORAL EVERY 6 HOURS PRN
Status: DISCONTINUED | OUTPATIENT
Start: 2023-09-26 | End: 2023-09-29 | Stop reason: HOSPADM

## 2023-09-26 RX ORDER — GABAPENTIN 600 MG/1
1200 TABLET ORAL ONCE
Status: COMPLETED | OUTPATIENT
Start: 2023-09-26 | End: 2023-09-26

## 2023-09-26 RX ORDER — LORAZEPAM 2 MG/ML
1 INJECTION INTRAMUSCULAR
Status: DISCONTINUED | OUTPATIENT
Start: 2023-09-26 | End: 2023-09-26

## 2023-09-26 RX ORDER — MAGNESIUM HYDROXIDE/ALUMINUM HYDROXICE/SIMETHICONE 120; 1200; 1200 MG/30ML; MG/30ML; MG/30ML
15 SUSPENSION ORAL ONCE
Status: COMPLETED | OUTPATIENT
Start: 2023-09-26 | End: 2023-09-26

## 2023-09-26 RX ORDER — GABAPENTIN 300 MG/1
600 CAPSULE ORAL EVERY 8 HOURS
Status: DISCONTINUED | OUTPATIENT
Start: 2023-09-29 | End: 2023-09-29 | Stop reason: HOSPADM

## 2023-09-26 RX ORDER — GABAPENTIN 300 MG/1
900 CAPSULE ORAL EVERY 8 HOURS
Status: COMPLETED | OUTPATIENT
Start: 2023-09-26 | End: 2023-09-29

## 2023-09-26 RX ORDER — HYDROXYZINE HYDROCHLORIDE 25 MG/1
50 TABLET, FILM COATED ORAL 3 TIMES DAILY PRN
Status: DISCONTINUED | OUTPATIENT
Start: 2023-09-26 | End: 2023-09-29 | Stop reason: HOSPADM

## 2023-09-26 RX ORDER — FOLIC ACID 1 MG/1
1 TABLET ORAL DAILY
Status: DISCONTINUED | OUTPATIENT
Start: 2023-09-26 | End: 2023-09-29 | Stop reason: HOSPADM

## 2023-09-26 RX ORDER — SODIUM CHLORIDE, SODIUM LACTATE, POTASSIUM CHLORIDE, CALCIUM CHLORIDE 600; 310; 30; 20 MG/100ML; MG/100ML; MG/100ML; MG/100ML
INJECTION, SOLUTION INTRAVENOUS CONTINUOUS
Status: DISCONTINUED | OUTPATIENT
Start: 2023-09-26 | End: 2023-09-27

## 2023-09-26 RX ORDER — HYDROXYZINE PAMOATE 50 MG/1
50 CAPSULE ORAL 3 TIMES DAILY PRN
Status: ON HOLD | COMMUNITY
End: 2023-11-11

## 2023-09-26 RX ORDER — HALOPERIDOL 5 MG/ML
2.5-5 INJECTION INTRAMUSCULAR EVERY 6 HOURS PRN
Status: DISCONTINUED | OUTPATIENT
Start: 2023-09-26 | End: 2023-09-29 | Stop reason: HOSPADM

## 2023-09-26 RX ORDER — NALTREXONE HYDROCHLORIDE 50 MG/1
50 TABLET, FILM COATED ORAL DAILY
Status: ON HOLD | COMMUNITY
End: 2023-09-29

## 2023-09-26 RX ORDER — ONDANSETRON 2 MG/ML
4 INJECTION INTRAMUSCULAR; INTRAVENOUS EVERY 6 HOURS PRN
Status: DISCONTINUED | OUTPATIENT
Start: 2023-09-26 | End: 2023-09-29 | Stop reason: HOSPADM

## 2023-09-26 RX ORDER — TRAZODONE HYDROCHLORIDE 100 MG/1
100 TABLET ORAL
Status: ON HOLD | COMMUNITY
End: 2023-11-11

## 2023-09-26 RX ORDER — SERTRALINE HYDROCHLORIDE 100 MG/1
100 TABLET, FILM COATED ORAL DAILY
Status: DISCONTINUED | OUTPATIENT
Start: 2023-09-27 | End: 2023-09-29 | Stop reason: HOSPADM

## 2023-09-26 RX ORDER — ONDANSETRON 4 MG/1
4 TABLET, ORALLY DISINTEGRATING ORAL EVERY 6 HOURS PRN
Status: DISCONTINUED | OUTPATIENT
Start: 2023-09-26 | End: 2023-09-29 | Stop reason: HOSPADM

## 2023-09-26 RX ORDER — ACETAMINOPHEN 650 MG/1
650 SUPPOSITORY RECTAL EVERY 6 HOURS PRN
Status: DISCONTINUED | OUTPATIENT
Start: 2023-09-26 | End: 2023-09-29 | Stop reason: HOSPADM

## 2023-09-26 RX ORDER — PROCHLORPERAZINE MALEATE 5 MG
10 TABLET ORAL EVERY 6 HOURS PRN
Status: DISCONTINUED | OUTPATIENT
Start: 2023-09-26 | End: 2023-09-29 | Stop reason: HOSPADM

## 2023-09-26 RX ADMIN — POTASSIUM & SODIUM PHOSPHATES POWDER PACK 280-160-250 MG 1 PACKET: 280-160-250 PACK at 18:51

## 2023-09-26 RX ADMIN — PANTOPRAZOLE SODIUM 40 MG: 40 INJECTION, POWDER, FOR SOLUTION INTRAVENOUS at 20:15

## 2023-09-26 RX ADMIN — LORAZEPAM 1 MG: 2 INJECTION INTRAMUSCULAR; INTRAVENOUS at 09:37

## 2023-09-26 RX ADMIN — SODIUM CHLORIDE, POTASSIUM CHLORIDE, SODIUM LACTATE AND CALCIUM CHLORIDE 1000 ML: 600; 310; 30; 20 INJECTION, SOLUTION INTRAVENOUS at 08:33

## 2023-09-26 RX ADMIN — ALUMINUM HYDROXIDE, MAGNESIUM HYDROXIDE, AND SIMETHICONE 15 ML: 200; 200; 20 SUSPENSION ORAL at 08:27

## 2023-09-26 RX ADMIN — GABAPENTIN 1200 MG: 600 TABLET, FILM COATED ORAL at 13:08

## 2023-09-26 RX ADMIN — PANTOPRAZOLE SODIUM 40 MG: 40 INJECTION, POWDER, FOR SOLUTION INTRAVENOUS at 08:30

## 2023-09-26 RX ADMIN — THIAMINE HCL TAB 100 MG 100 MG: 100 TAB at 09:26

## 2023-09-26 RX ADMIN — GABAPENTIN 900 MG: 300 CAPSULE ORAL at 20:15

## 2023-09-26 RX ADMIN — ACETAMINOPHEN 650 MG: 325 TABLET, FILM COATED ORAL at 13:26

## 2023-09-26 RX ADMIN — LORAZEPAM 1 MG: 2 INJECTION INTRAMUSCULAR; INTRAVENOUS at 10:55

## 2023-09-26 RX ADMIN — ONDANSETRON 4 MG: 2 INJECTION INTRAMUSCULAR; INTRAVENOUS at 07:39

## 2023-09-26 RX ADMIN — POTASSIUM & SODIUM PHOSPHATES POWDER PACK 280-160-250 MG 1 PACKET: 280-160-250 PACK at 22:19

## 2023-09-26 RX ADMIN — HALOPERIDOL LACTATE 2.5 MG: 5 INJECTION, SOLUTION INTRAMUSCULAR at 13:26

## 2023-09-26 RX ADMIN — FOLIC ACID 1 MG: 1 TABLET ORAL at 13:07

## 2023-09-26 RX ADMIN — MULTIPLE VITAMINS W/ MINERALS TAB 1 TABLET: TAB at 09:26

## 2023-09-26 RX ADMIN — POTASSIUM & SODIUM PHOSPHATES POWDER PACK 280-160-250 MG 1 PACKET: 280-160-250 PACK at 15:36

## 2023-09-26 RX ADMIN — SODIUM CHLORIDE 1000 ML: 9 INJECTION, SOLUTION INTRAVENOUS at 07:39

## 2023-09-26 RX ADMIN — LORAZEPAM 1 MG: 1 TABLET ORAL at 15:48

## 2023-09-26 RX ADMIN — SODIUM CHLORIDE, POTASSIUM CHLORIDE, SODIUM LACTATE AND CALCIUM CHLORIDE: 600; 310; 30; 20 INJECTION, SOLUTION INTRAVENOUS at 13:09

## 2023-09-26 RX ADMIN — ONDANSETRON 4 MG: 2 INJECTION INTRAMUSCULAR; INTRAVENOUS at 08:24

## 2023-09-26 RX ADMIN — LORAZEPAM 2 MG: 2 INJECTION INTRAMUSCULAR; INTRAVENOUS at 08:26

## 2023-09-26 ASSESSMENT — ACTIVITIES OF DAILY LIVING (ADL)
ADLS_ACUITY_SCORE: 35
ADLS_ACUITY_SCORE: 37
ADLS_ACUITY_SCORE: 35
ADLS_ACUITY_SCORE: 35

## 2023-09-26 NOTE — ED PROVIDER NOTES
History     Chief Complaint:  Alcohol Problem       HPI   Chuck Magdaleno is a 33 year old male with history of alcohol use disorder, withdrawal with seizures, anxiety, depression, marijuana dependence, ADHD, who presents with concerns for alcohol withdrawals.  Patient states that his last alcoholic beverage was last night and that he has been drinking copious amounts of vodka daily for quite some time.  Reports that since last night, he has been vomiting blood numerous times, unable to keep anything down.  Also endorses epigastric pain and burning sensation throughout the esophagus.  States that he has not had a bowel movement in 3 days but stools were black in color at that time.  Patient states that he has not had any upper or lower scope for previous bleeding but does report history of bloody emesis with significant alcohol use.  Denies seizures during this episode.    Independent Historian:   None - Patient Only    Review of External Notes:   [facility] admission for EtOH withdrawals 7/27/23  [facility] admission for EtOH withdrawals 4/14/23, no evidence of DT, discharged to chem dep treatment  [facility] visit for EtOH withdrawal 3/12/23  [facility] admission for EtOH withdrawals on 1/5/23 - discharged to chem dep      Medications:    Norco  Carafate  Vistaril  Zoloft  Desyrel  Revia    Past Medical History:    Anxiety  Nicotine dependence  ADHD  Alcohol use disorder  Alcohol withdrawal syndrome  Marijuana dependence  Depression, major, recurrent    Past Surgical History:    Appendectomy       Physical Exam   Patient Vitals for the past 24 hrs:   BP Temp Temp src Pulse Resp SpO2   09/26/23 0945 127/83 -- -- 89 20 96 %   09/26/23 0926 123/78 -- -- 91 13 97 %   09/26/23 0915 (!) 133/91 -- -- 92 15 96 %   09/26/23 0735 (!) 164/107 97.8  F (36.6  C) Temporal -- -- --   09/26/23 0734 -- -- -- 119 (!) 40 99 %        Physical Exam  General: Tremulous, anxious, rapid speech  Head:  Scalp is NC/AT  Eyes:  No  scleral icterus, PERRL  ENT:  The external nose and ears are normal.   Neck:  Normal range of motion without rigidity.  CV:  Tachycardic    No pathologic murmur   Resp:  Breath sounds are clear bilaterally    Non-labored, no retractions or accessory muscle use  GI:  RUQ pain to palpation, no rebound or guarding  MS:  No lower extremity edema   Skin:  Warm and dry, No rash or lesions noted.  Neuro:  Oriented x 3. No gross motor deficits.      Emergency Department Course   ECG  ECG taken at 0916, ECG read at 0930  Normal sinus rhythm  Prolonged QT  Abnormal ECG  No prior comparison.  Rate 89 bpm. KS interval 152 ms. QRS duration 96 ms. QT/QTc 406/493 ms. P-R-T axes 65 57 49.    Laboratory:  Labs Ordered and Resulted from Time of ED Arrival to Time of ED Departure   CBC WITH PLATELETS - Abnormal       Result Value    WBC Count 13.1 (*)     RBC Count 5.39      Hemoglobin 15.9      Hematocrit 45.7      MCV 85      MCH 29.5      MCHC 34.8      RDW 13.0      Platelet Count 305     BASIC METABOLIC PANEL - Abnormal    Sodium 140      Potassium 3.6      Chloride 85 (*)     Carbon Dioxide (CO2) 29      Anion Gap 26 (*)     Urea Nitrogen 18.9      Creatinine 0.94      GFR Estimate >90      Calcium 10.7 (*)     Glucose 121 (*)    HEPATIC FUNCTION PANEL - Abnormal    Protein Total 9.1 (*)     Albumin 5.5 (*)     Bilirubin Total 1.0      Alkaline Phosphatase 77      AST 55 (*)     ALT 29      Bilirubin Direct <0.20     KETONE BETA-HYDROXYBUTYRATE QUANTITATIVE, RAPID - Abnormal    Ketone (Beta-Hydroxybutyrate) Quantitative 3.70 (*)    PHOSPHORUS - Abnormal    Phosphorus 2.3 (*)    ETHYL ALCOHOL LEVEL - Normal    Alcohol ethyl <0.01     LIPASE - Normal    Lipase 34     MAGNESIUM - Normal    Magnesium 1.9          Procedures   None    Emergency Department Course & Assessments:    Interventions:  Medications   LORazepam (ATIVAN) injection 1 mg (1 mg Intravenous $Given 9/26/23 0974)   sodium chloride 0.9% BOLUS 1,000 mL (0 mLs  Intravenous Stopped 9/26/23 0828)   ondansetron (ZOFRAN) injection 4 mg (4 mg Intravenous $Given 9/26/23 0739)   ondansetron (ZOFRAN) injection 4 mg (4 mg Intravenous $Given 9/26/23 0824)   alum & mag hydroxide-simethicone (MAALOX) suspension 15 mL (15 mLs Oral $Given 9/26/23 0827)   LORazepam (ATIVAN) injection 2 mg (2 mg Intravenous $Given 9/26/23 0826)   pantoprazole (PROTONIX) IV push injection 40 mg (40 mg Intravenous $Given 9/26/23 0830)   lactated ringers BOLUS 1,000 mL (0 mLs Intravenous Stopped 9/26/23 0959)   thiamine (B-1) tablet 100 mg (100 mg Oral $Given 9/26/23 0926)   multivitamin w/minerals (THERA-VIT-M) tablet 1 tablet (1 tablet Oral $Given 9/26/23 0926)        Independent Interpretation (X-rays, CTs, rhythm strip):  None    Consultations/Discussion of Management or Tests:   ED Course as of 09/26/23 1019   Tue Sep 26, 2023   0911 Consulted with Franca LEVIN with inpatient hospitalist service       Social Determinants of Health affecting care:   EtOH abuse disorder    Disposition:  The patient was admitted to the hospital under the care of Dr. Gaspar.     Impression & Plan      Medical Decision Making:  Chuck Magdaleno is a 33 year old male who presents with alcohol withdrawal concerns, history of alcohol withdrawal seizures. CIWA score of 45 upon arrival to ED. hypertensive, tachypneic, tachycardic upon arrival.  Patient hyperventilating at times.  He was provided 2 mg of Ativan via IV with significant improvement of symptoms. Prn Ativan ordered. Patient has not had an endoscopy previously, unknown if patient has esophageal varices.  Provided patient with GI cocktail and Protonix for suspected upper GI bleed.  No bloody emesis during ED stay.  His hemoglobin is stable.  EKG with mildly prolonged QT however under 500.  Magnesium not provided at this time.  His alcohol level is nondetectable.  Labs remarkable for acidosis, likely alcohol ketosis as cause.  Mildly elevated white blood cell count,  likely reactive.  His AST is mildly elevated likely in the setting of ongoing alcohol use.  Patient will require admission for ongoing alcohol withdrawal treatment and monitoring.  Consulted with Franca Reddy PA-C for Dr. Gaspar with inpatient hospitalist service who accepts patient's admission for ongoing cares.      Diagnosis:    ICD-10-CM    1. Alcohol dependence with withdrawal with complication (H)  F10.239       2. Bloody emesis  K92.0       3. Alcoholic ketosis (H)  E88.89            Discharge Medications:  New Prescriptions    No medications on file          9/26/2023   POONAM Villasenor Lauren R, PA-C  09/26/23 1019

## 2023-09-26 NOTE — ED NOTES
"[facility]  ED Nurse Handoff Report    ED Chief complaint: Alcohol Problem  . ED Diagnosis:   Final diagnoses:   Alcohol dependence with withdrawal with complication (H)   Bloody emesis   Alcoholic ketosis (H)       Allergies: No Known Allergies    Code Status: Full Code    Activity level - Baseline/Home:  independent.  Activity Level - Current:   independent.   Lift room needed: No.   Bariatric: No   Needed: No   Isolation: No.   Infection: Not Applicable.     Respiratory status: Room air    Vital Signs (within 30 minutes):   Vitals:    09/26/23 0734 09/26/23 0735 09/26/23 0915 09/26/23 0926   BP:  (!) 164/107 (!) 133/91 123/78   Pulse: 119  92 91   Resp: (!) 40  15 13   Temp:  97.8  F (36.6  C)     TempSrc:  Temporal     SpO2: 99%  96% 97%       Cardiac Rhythm:  ,      Pain level:    Patient confused: No.   Patient Falls Risk: activity supervised.   Elimination Status:  has not voided since arrival        Patient Report - Initial Complaint: Pt arrives via pv from hotel. Pt arrives to ED after \"a strickland\". Pt has been drinking a Liter of vodka daily. Last drink approx 6 hours pta. Pt reports coffee ground emesis and dark stool.    Focused Assessment: Pt arrives awake, alert, nauseous, diaphoretic. Pt appears unwell. CIWA on arrival 45. Sx's improving with 2mg iv ativan.      Abnormal Results:   Labs Ordered and Resulted from Time of ED Arrival to Time of ED Departure   CBC WITH PLATELETS - Abnormal       Result Value    WBC Count 13.1 (*)     RBC Count 5.39      Hemoglobin 15.9      Hematocrit 45.7      MCV 85      MCH 29.5      MCHC 34.8      RDW 13.0      Platelet Count 305     BASIC METABOLIC PANEL - Abnormal    Sodium 140      Potassium 3.6      Chloride 85 (*)     Carbon Dioxide (CO2) 29      Anion Gap 26 (*)     Urea Nitrogen 18.9      Creatinine 0.94      GFR Estimate >90      Calcium 10.7 (*)     Glucose 121 (*)    HEPATIC FUNCTION PANEL - Abnormal    Protein Total 9.1 (*)  "    Albumin 5.5 (*)     Bilirubin Total 1.0      Alkaline Phosphatase 77      AST 55 (*)     ALT 29      Bilirubin Direct <0.20     ETHYL ALCOHOL LEVEL - Normal    Alcohol ethyl <0.01     LIPASE - Normal    Lipase 34     KETONE BETA-HYDROXYBUTYRATE QUANTITATIVE, RAPID   MAGNESIUM   PHOSPHORUS        No orders to display       Treatments provided: IVF, zofran, lorazepam  Family Comments: Mother aware of admission  OBS brochure/video discussed/provided to patient:  No  ED Medications:   Medications   LORazepam (ATIVAN) injection 1 mg (1 mg Intravenous $Given 9/26/23 0937)   sodium chloride 0.9% BOLUS 1,000 mL (0 mLs Intravenous Stopped 9/26/23 0828)   ondansetron (ZOFRAN) injection 4 mg (4 mg Intravenous $Given 9/26/23 0739)   ondansetron (ZOFRAN) injection 4 mg (4 mg Intravenous $Given 9/26/23 0824)   alum & mag hydroxide-simethicone (MAALOX) suspension 15 mL (15 mLs Oral $Given 9/26/23 0827)   LORazepam (ATIVAN) injection 2 mg (2 mg Intravenous $Given 9/26/23 0826)   pantoprazole (PROTONIX) IV push injection 40 mg (40 mg Intravenous $Given 9/26/23 0830)   lactated ringers BOLUS 1,000 mL (1,000 mLs Intravenous $New Bag 9/26/23 0833)   thiamine (B-1) tablet 100 mg (100 mg Oral $Given 9/26/23 0926)   multivitamin w/minerals (THERA-VIT-M) tablet 1 tablet (1 tablet Oral $Given 9/26/23 0926)       Drips infusing:  No  For the majority of the shift this patient was Green.   Interventions performed were IVF, zofran, CIWA protocol.    Sepsis treatment initiated: No    Cares/treatment/interventions/medications to be completed following ED care: Monitor CIWA and potential GI bleeding.     ED Nurse Name: Malia Reid RN  9:43 AM  RECEIVING UNIT ED HANDOFF REVIEW    Above ED Nurse Handoff Report was reviewed: Yes  Reviewed by: Alcon Ga RN on September 26, 2023 at 6:47 PM

## 2023-09-26 NOTE — PLAN OF CARE
Pt A/O x4. VSS. On room air. Tylenol for headache.  CIWA 33, 25, 25, 8 - c/o visual and auditory hallucinations - hears music and sees people when he tries to sleep. Haldol given. Patient was able to fall asleep after haldol.  Numbness and tingling on extremities improved after gabapentin.   Up Ax1.  Urinal at the bedside. K-Mg-Phos protocol; phos replaced, All AM recheck. Denies nausea. Tolerating clears. Will continue to monitor.

## 2023-09-26 NOTE — PHARMACY-ADMISSION MEDICATION HISTORY
Pharmacist Admission Medication History    Admission medication history is complete. The information provided in this note is only as accurate as the sources available at the time of the update.    Medication reconciliation/reorder completed by provider prior to medication history? No    Information Source(s): Patient via in-person    Pertinent Information: Naltrexone prescribed 8.4.23 #30 - patient took for one week and stopped due to side effects. Does not take folic acid or MVI currently.    Changes made to PTA medication list:  Added: All.  Deleted: Norco (old Rx from 2014), sucralfate (2018) - does not take both, confirmed by patient.  Changed: None.    Medication Affordability:  Not including over the counter (OTC) medications, was there a time in the past 3 months when you did not take your medications as prescribed because of cost?: No    Allergies reviewed with patient and updates made in EHR: yes    Medication History Completed By: Patsy Alegre [facility] 9/26/2023 11:51 AM    Prior to Admission medications    Medication Sig Last Dose Taking? Auth Provider Long Term End Date   hydrOXYzine (VISTARIL) 50 MG capsule Take 50 mg by mouth 3 times daily as needed for anxiety prn Yes Unknown, Entered By History     naltrexone (DEPADE/REVIA) 50 MG tablet Take 50 mg by mouth daily Past Month Yes Unknown, Entered By History Yes    sertraline (ZOLOFT) 100 MG tablet Take 100 mg by mouth daily Past Week Yes Unknown, Entered By History Yes    traZODone (DESYREL) 100 MG tablet Take 100 mg by mouth nightly as needed for sleep prn Yes Unknown, Entered By History Yes

## 2023-09-26 NOTE — H&P
[facility]    History and Physical - Hospitalist Service       Date of Admission:  9/26/2023    Assessment & Plan      Chuck Magdaleno is a 33 year old male with history of alcoholism and alcohol withdrawal with seizure admitted on 9/26/2023 with complaint of intractable abdominal pain, nausea, vomiting and hematemesis.    In the ED he was afebrile with initial heart rate of 119 that improved to 89 after IV fluids, pressure 164/107 that improved to 137/84 and breathing comfortably on room air without hypoxia.  Lab work was remarkable for creatinine 0.94, BUN of 18.9, decreased chloride at 85, elevated anion gap of 26, normal electrolytes with the exception of calcium of 10.7 and low phosphorus at 2.3, normal LFTs with the exception of elevated AST of 55, glucose 121, lipase 34, ketones elevated at 3.7 and CBC remarkable for mildly elevated WBC of 13.1.  Alcohol level was undetectable and EKG showed sinus rhythm with prolonged QTc of 493 ms.  He was given Maalox, LR bolus, normal saline bolus, Ativan 2 mg IV and 1 mg dosing x2, multivitamin, thiamine 100 mg, Zofran 4 mg IV x2 and Protonix 40 mg IV with request for admission to the hospital for alcohol withdrawal.    #Alcohol withdrawal  #History of alcoholism  -Patient has a history of alcoholism and was sober for 5 months but started drinking a liter of hard alcohol daily for the past 2 weeks  -Has been on naltrexone in the past but not taken recently  -Last drink was at 3 AM on 9/26 but he reports 36 hours of symptoms which will be described below  -Has been in treatment before and has resources that he will use as outpatient so declines chemical dependency  -Is tremulous with hypertension and tachycardia  -Plan for CIWA protocol with Ativan triggered dosing  -Schedule gabapentin  -Pressures are okay so we will hold off on scheduled clonidine  -Scheduled oral vitamins  -Monitor replace electrolytes  -LR at 100 mg/h      #Starvation  ketosis  #Elevated anion gap metabolic acidosis  -Related to alcohol withdrawal and expected to improve with IV fluids and electrolyte replacement    #Intractable abdominal pain, nausea and vomiting  #Reported hematemesis  #Suspect alcoholic gastritis  -Patient describes 36 hours of intractable nausea, vomiting, abdominal pain and inability to keep anything down  -For the past 12 hours he has had hematemesis and now coffee-ground like emesis  -Vitally stable without significant drop in hemoglobin so doubt there is fast GI bleed  -Plan for Protonix 40 mg IV twice daily  -Clear liquid diet for now but will have low threshold for n.p.o. status and GI consult    #Hypophosphatemia  -Replacement ordered    #Elevated WBC  -No infectious complaints suspect this is due to hemoconcentration    #Anxiety/Depression  -Continue trazodone, hydroxyzine and Zoloft        Diet:  clear liquid diet  DVT Prophylaxis: Pneumatic Compression Devices  Eugene Catheter: Not present  Lines: None     Cardiac Monitoring: None  Code Status:  Full code    Clinically Significant Risk Factors Present on Admission           # Hypercalcemia: Highest Ca = 10.7 mg/dL in last 2 days, will monitor as appropriate   # Anion Gap Metabolic Acidosis: Highest Anion Gap = 26 mmol/L in last 2 days, will monitor and treat as appropriate                      Disposition Plan      Expected Discharge Date: 09/28/2023                The patient's care was discussed with the Attending Physician, Dr. Mooney, Patient, and ED Consultant(s).    Yudith Reddy PA-C  Hospitalist Service  [facility]  Securely message with [employer] (more info)  Text page via [facility] Paging/Directory     ______________________________________________________________________    Chief Complaint   Intractable abdominal pain, nausea, vomiting and hematemesis in the setting of alcohol withdrawal    History is obtained from the patient    History of Present Illness    Chuck Magdaleno is a 33 year old male who presents with intractable nausea, vomiting and abdominal pain for the last 36 hours.  His last alcoholic beverage was at 3 AM on 9/26.  He states this happens after he drinks large amounts of alcohol and he admits to drinking at least a liter of vodka daily for the past 2 weeks.  Prior to this he was sober.  In the last 12 hours he has developed hematemesis that started out as bright red and is now been coffee-ground like.  He had 1 blackish stool recently.  He denies shortness of breath, cough, chest discomfort, diarrhea and lightheadedness/dizziness.  When he arrived in the ED today he had an episode of tingling that washed over his entire body and continues to have intermittent tingling/contractures of his fingers.  He denies illegal drug use but occasionally uses marijuana.  He does not use tobacco.      Past Medical History    No past medical history on file.    Past Surgical History   Past Surgical History:   Procedure Laterality Date    APPENDECTOMY OPEN         Prior to Admission Medications   Prior to Admission Medications   Prescriptions Last Dose Informant Patient Reported? Taking?   hydrOXYzine (VISTARIL) 50 MG capsule prn  Yes Yes   Sig: Take 50 mg by mouth 3 times daily as needed for anxiety   naltrexone (DEPADE/REVIA) 50 MG tablet Past Month  Yes Yes   Sig: Take 50 mg by mouth daily   sertraline (ZOLOFT) 100 MG tablet Past Week  Yes Yes   Sig: Take 100 mg by mouth daily   traZODone (DESYREL) 100 MG tablet prn  Yes Yes   Sig: Take 100 mg by mouth nightly as needed for sleep      Facility-Administered Medications: None          Physical Exam   Vital Signs: Temp: 97.8  F (36.6  C) Temp src: Temporal BP: 137/84 Pulse: 89   Resp: 20 SpO2: 96 % O2 Device: None (Room air)    Weight: 0 lbs 0 oz    General Appearance: Tremulous, alert and oriented x3  Respiratory: Clear to auscultation bilaterally  Cardiovascular: RRR without murmur  GI: Bowel sounds are present  without tenderness  Skin: No rashes or open sores are noted      Medical Decision Making       55 MINUTES SPENT BY ME on the date of service doing chart review, history, exam, documentation & further activities per the note.      Data     I have personally reviewed the following data over the past 24 hrs:    13.1 (H)  \   15.9   / 305     140 85 (L) 18.9 /  121 (H)   3.6 29 0.94 \     ALT: 29 AST: 55 (H) AP: 77 TBILI: 1.0   ALB: 5.5 (H) TOT PROTEIN: 9.1 (H) LIPASE: 34       Imaging results reviewed over the past 24 hrs:   No results found for this or any previous visit (from the past 24 hour(s)).

## 2023-09-26 NOTE — ED TRIAGE NOTES
"Patient presents to the ED stating he is going through alcohol withdrawal. Patient states he \"has been on a strickland\" and for the past day has had burning abdominal pain and has been vomiting blood. Last drink 6 hours ago. Reports a history of seizures with alcohol withdrawal in the past. Upon arrival to the ED patient is agitated, restless and hyperventilating.         "

## 2023-09-26 NOTE — CARE PLAN
[facility]    ED Boarding Nurse Handoff Addendum Report:    Date/time: 9/26/2023, 11:11 AM    Activity Level: assist of 1    Fall Risk: Yes:  nonskid shoes/slippers when out of bed, arm band in place, and activity supervised    Active Infusions: None    Current Meds Due: None    Current care needs: None    Oxygen requirements (liters/min and/or FiO2): None    Respiratory status: Room air    Vital signs (within last 30 minutes):    Vitals:    09/26/23 0915 09/26/23 0926 09/26/23 0945 09/26/23 1054   BP: (!) 133/91 123/78 127/83 137/84   Pulse: 92 91 89    Resp: 15 13 20    Temp:       TempSrc:       SpO2: 96% 97% 96% 96%       Focused assessment within last 30 minutes:    C/o RUQ abd pain 7/10, SOB with activity-LS clear. CIWA 25    ED Boarding Nurse name: Briseida Piper RN

## 2023-09-27 LAB
ANION GAP SERPL CALCULATED.3IONS-SCNC: 11 MMOL/L (ref 7–15)
BUN SERPL-MCNC: 7.4 MG/DL (ref 6–20)
CALCIUM SERPL-MCNC: 9.2 MG/DL (ref 8.6–10)
CHLORIDE SERPL-SCNC: 99 MMOL/L (ref 98–107)
CREAT SERPL-MCNC: 0.92 MG/DL (ref 0.67–1.17)
DEPRECATED HCO3 PLAS-SCNC: 31 MMOL/L (ref 22–29)
EGFRCR SERPLBLD CKD-EPI 2021: >90 ML/MIN/1.73M2
ERYTHROCYTE [DISTWIDTH] IN BLOOD BY AUTOMATED COUNT: 12.7 % (ref 10–15)
GLUCOSE SERPL-MCNC: 94 MG/DL (ref 70–99)
HCT VFR BLD AUTO: 40.1 % (ref 40–53)
HGB BLD-MCNC: 13.7 G/DL (ref 13.3–17.7)
MAGNESIUM SERPL-MCNC: 2 MG/DL (ref 1.7–2.3)
MCH RBC QN AUTO: 29.8 PG (ref 26.5–33)
MCHC RBC AUTO-ENTMCNC: 34.2 G/DL (ref 31.5–36.5)
MCV RBC AUTO: 87 FL (ref 78–100)
PHOSPHATE SERPL-MCNC: 4.1 MG/DL (ref 2.5–4.5)
PLATELET # BLD AUTO: 204 10E3/UL (ref 150–450)
POTASSIUM SERPL-SCNC: 3.4 MMOL/L (ref 3.4–5.3)
RBC # BLD AUTO: 4.59 10E6/UL (ref 4.4–5.9)
SODIUM SERPL-SCNC: 141 MMOL/L (ref 135–145)
WBC # BLD AUTO: 4.2 10E3/UL (ref 4–11)

## 2023-09-27 PROCEDURE — 120N000001 HC R&B MED SURG/OB

## 2023-09-27 PROCEDURE — 84100 ASSAY OF PHOSPHORUS: CPT | Performed by: PHYSICIAN ASSISTANT

## 2023-09-27 PROCEDURE — 258N000003 HC RX IP 258 OP 636: Performed by: PHYSICIAN ASSISTANT

## 2023-09-27 PROCEDURE — C9113 INJ PANTOPRAZOLE SODIUM, VIA: HCPCS | Mod: JZ | Performed by: PHYSICIAN ASSISTANT

## 2023-09-27 PROCEDURE — 36415 COLL VENOUS BLD VENIPUNCTURE: CPT | Performed by: PHYSICIAN ASSISTANT

## 2023-09-27 PROCEDURE — 250N000011 HC RX IP 250 OP 636: Mod: JZ | Performed by: PHYSICIAN ASSISTANT

## 2023-09-27 PROCEDURE — 250N000013 HC RX MED GY IP 250 OP 250 PS 637: Performed by: PHYSICIAN ASSISTANT

## 2023-09-27 PROCEDURE — 80048 BASIC METABOLIC PNL TOTAL CA: CPT | Performed by: PHYSICIAN ASSISTANT

## 2023-09-27 PROCEDURE — 85027 COMPLETE CBC AUTOMATED: CPT | Performed by: PHYSICIAN ASSISTANT

## 2023-09-27 PROCEDURE — 83735 ASSAY OF MAGNESIUM: CPT | Performed by: PHYSICIAN ASSISTANT

## 2023-09-27 RX ADMIN — LORAZEPAM 2 MG: 1 TABLET ORAL at 01:03

## 2023-09-27 RX ADMIN — LORAZEPAM 1 MG: 1 TABLET ORAL at 10:43

## 2023-09-27 RX ADMIN — LORAZEPAM 1 MG: 1 TABLET ORAL at 18:10

## 2023-09-27 RX ADMIN — PANTOPRAZOLE SODIUM 40 MG: 40 INJECTION, POWDER, FOR SOLUTION INTRAVENOUS at 19:56

## 2023-09-27 RX ADMIN — THIAMINE HCL TAB 100 MG 100 MG: 100 TAB at 09:23

## 2023-09-27 RX ADMIN — GABAPENTIN 900 MG: 300 CAPSULE ORAL at 11:43

## 2023-09-27 RX ADMIN — LORAZEPAM 1 MG: 1 TABLET ORAL at 11:43

## 2023-09-27 RX ADMIN — GABAPENTIN 900 MG: 300 CAPSULE ORAL at 03:53

## 2023-09-27 RX ADMIN — ACETAMINOPHEN 650 MG: 325 TABLET, FILM COATED ORAL at 15:24

## 2023-09-27 RX ADMIN — SODIUM CHLORIDE, POTASSIUM CHLORIDE, SODIUM LACTATE AND CALCIUM CHLORIDE: 600; 310; 30; 20 INJECTION, SOLUTION INTRAVENOUS at 09:19

## 2023-09-27 RX ADMIN — SERTRALINE HYDROCHLORIDE 100 MG: 100 TABLET ORAL at 09:20

## 2023-09-27 RX ADMIN — LORAZEPAM 1 MG: 1 TABLET ORAL at 21:08

## 2023-09-27 RX ADMIN — GABAPENTIN 900 MG: 300 CAPSULE ORAL at 19:56

## 2023-09-27 RX ADMIN — LORAZEPAM 2 MG: 1 TABLET ORAL at 00:28

## 2023-09-27 RX ADMIN — LORAZEPAM 1 MG: 1 TABLET ORAL at 09:20

## 2023-09-27 RX ADMIN — LORAZEPAM 2 MG: 1 TABLET ORAL at 16:33

## 2023-09-27 RX ADMIN — Medication 5 MG: at 21:08

## 2023-09-27 RX ADMIN — FOLIC ACID 1 MG: 1 TABLET ORAL at 09:20

## 2023-09-27 RX ADMIN — LORAZEPAM 2 MG: 1 TABLET ORAL at 15:24

## 2023-09-27 RX ADMIN — MULTIPLE VITAMINS W/ MINERALS TAB 1 TABLET: TAB at 09:20

## 2023-09-27 RX ADMIN — LORAZEPAM 1 MG: 1 TABLET ORAL at 03:58

## 2023-09-27 RX ADMIN — PANTOPRAZOLE SODIUM 40 MG: 40 INJECTION, POWDER, FOR SOLUTION INTRAVENOUS at 09:20

## 2023-09-27 RX ADMIN — LORAZEPAM 2 MG: 1 TABLET ORAL at 14:30

## 2023-09-27 ASSESSMENT — ACTIVITIES OF DAILY LIVING (ADL)
ADLS_ACUITY_SCORE: 37
ADLS_ACUITY_SCORE: 37
ADLS_ACUITY_SCORE: 20
ADLS_ACUITY_SCORE: 20
ADLS_ACUITY_SCORE: 37
ADLS_ACUITY_SCORE: 20
NUMBER_OF_TIMES_PATIENT_HAS_FALLEN_WITHIN_LAST_SIX_MONTHS: 5
DIFFICULTY_COMMUNICATING: NO
DOING_ERRANDS_INDEPENDENTLY_DIFFICULTY: NO
ADLS_ACUITY_SCORE: 20
HEARING_DIFFICULTY_OR_DEAF: NO
ADLS_ACUITY_SCORE: 20
CONCENTRATING,_REMEMBERING_OR_MAKING_DECISIONS_DIFFICULTY: NO
FALL_HISTORY_WITHIN_LAST_SIX_MONTHS: YES
DIFFICULTY_EATING/SWALLOWING: NO
ADLS_ACUITY_SCORE: 20
CHANGE_IN_FUNCTIONAL_STATUS_SINCE_ONSET_OF_CURRENT_ILLNESS/INJURY: YES
WALKING_OR_CLIMBING_STAIRS_DIFFICULTY: NO
TOILETING_ISSUES: NO
WEAR_GLASSES_OR_BLIND: NO
DRESSING/BATHING_DIFFICULTY: NO
ADLS_ACUITY_SCORE: 37

## 2023-09-27 NOTE — PLAN OF CARE
Goal Outcome Evaluation:         Admitting Diagnosis:  Alcohol withdrawal   Pertinent History: alcohol withdrawal with seizure.  Living Situation: Hotel  Pain plan:  denies pain    Mobility:  assistance, 1 person  Baseline activity: Independent.  Alarms/Safety: Bed Alarm  LDA's:  Peripheral  Pertinent test results:  K+ 3.6, Mg+ 1.9, phos 2.3, replaced, recheck AM.    Consults:  none   Abnormals/Pending: [place]  3.70.   Other Cares/Comments: A &O, VSS, LS clear, IVF infusing. CIWA 7.  Seizure pad in place.   Discharge Disposition:  TBD  Discharge Time:  TBD

## 2023-09-27 NOTE — PLAN OF CARE
Goal Outcome Evaluation:     VS stable. CIWA scores of 23, 14, 7, 11, 5 and ativan given as prescribed. No pain except slight headache. Slept well throughout the night.

## 2023-09-27 NOTE — PLAN OF CARE
"Orientation: A/O x4  VS: BP (!) 136/97 (BP Location: Left arm)   Pulse 74   Temp 97.9  F (36.6  C) (Axillary)   Resp 16   Ht 1.803 m (5' 11\")   Wt 79.5 kg (175 lb 3.2 oz)   SpO2 97%   BMI 24.44 kg/m    LS: clear, on RA. Denies VILLEDA  GI: Reg, tolerating well. No BM, prune juice given  : voiding into bedside urinal w/o difficulties  Skin: WDL  Activity: A1 w/ GB  Pain: headache, relieved w/ ativan per CIWA + tylenol  Lines:PIV x1  Plan: Ciwa scores 12, 8, 8, 5, 14, 13, 3, 11  . Showered this mickey      "

## 2023-09-28 LAB
ANION GAP SERPL CALCULATED.3IONS-SCNC: 7 MMOL/L (ref 7–15)
BUN SERPL-MCNC: 8.2 MG/DL (ref 6–20)
CALCIUM SERPL-MCNC: 9.3 MG/DL (ref 8.6–10)
CHLORIDE SERPL-SCNC: 102 MMOL/L (ref 98–107)
CREAT SERPL-MCNC: 0.93 MG/DL (ref 0.67–1.17)
EGFRCR SERPLBLD CKD-EPI 2021: >90 ML/MIN/1.73M2
ERYTHROCYTE [DISTWIDTH] IN BLOOD BY AUTOMATED COUNT: 12.5 % (ref 10–15)
GLUCOSE SERPL-MCNC: 98 MG/DL (ref 70–99)
HCO3 SERPL-SCNC: 31 MMOL/L (ref 22–29)
HCT VFR BLD AUTO: 41.7 % (ref 40–53)
HGB BLD-MCNC: 13.9 G/DL (ref 13.3–17.7)
MAGNESIUM SERPL-MCNC: 2.1 MG/DL (ref 1.7–2.3)
MCH RBC QN AUTO: 29.8 PG (ref 26.5–33)
MCHC RBC AUTO-ENTMCNC: 33.3 G/DL (ref 31.5–36.5)
MCV RBC AUTO: 90 FL (ref 78–100)
PHOSPHATE SERPL-MCNC: 3.8 MG/DL (ref 2.5–4.5)
PLATELET # BLD AUTO: 179 10E3/UL (ref 150–450)
POTASSIUM SERPL-SCNC: 4.4 MMOL/L (ref 3.4–5.3)
RBC # BLD AUTO: 4.66 10E6/UL (ref 4.4–5.9)
SODIUM SERPL-SCNC: 140 MMOL/L (ref 135–145)
WBC # BLD AUTO: 4.6 10E3/UL (ref 4–11)

## 2023-09-28 PROCEDURE — 83735 ASSAY OF MAGNESIUM: CPT | Performed by: STUDENT IN AN ORGANIZED HEALTH CARE EDUCATION/TRAINING PROGRAM

## 2023-09-28 PROCEDURE — 250N000013 HC RX MED GY IP 250 OP 250 PS 637: Performed by: INTERNAL MEDICINE

## 2023-09-28 PROCEDURE — 250N000011 HC RX IP 250 OP 636: Performed by: PHYSICIAN ASSISTANT

## 2023-09-28 PROCEDURE — 250N000013 HC RX MED GY IP 250 OP 250 PS 637: Performed by: PHYSICIAN ASSISTANT

## 2023-09-28 PROCEDURE — 85027 COMPLETE CBC AUTOMATED: CPT | Performed by: PHYSICIAN ASSISTANT

## 2023-09-28 PROCEDURE — 84100 ASSAY OF PHOSPHORUS: CPT | Performed by: STUDENT IN AN ORGANIZED HEALTH CARE EDUCATION/TRAINING PROGRAM

## 2023-09-28 PROCEDURE — 120N000001 HC R&B MED SURG/OB

## 2023-09-28 PROCEDURE — 36415 COLL VENOUS BLD VENIPUNCTURE: CPT | Performed by: PHYSICIAN ASSISTANT

## 2023-09-28 PROCEDURE — 80048 BASIC METABOLIC PNL TOTAL CA: CPT | Performed by: PHYSICIAN ASSISTANT

## 2023-09-28 PROCEDURE — 99232 SBSQ HOSP IP/OBS MODERATE 35: CPT | Performed by: INTERNAL MEDICINE

## 2023-09-28 RX ORDER — SUCRALFATE ORAL 1 G/10ML
1 SUSPENSION ORAL
Status: DISCONTINUED | OUTPATIENT
Start: 2023-09-28 | End: 2023-09-29 | Stop reason: HOSPADM

## 2023-09-28 RX ORDER — PANTOPRAZOLE SODIUM 40 MG/1
40 TABLET, DELAYED RELEASE ORAL
Status: DISCONTINUED | OUTPATIENT
Start: 2023-09-28 | End: 2023-09-29 | Stop reason: HOSPADM

## 2023-09-28 RX ADMIN — GABAPENTIN 900 MG: 300 CAPSULE ORAL at 19:52

## 2023-09-28 RX ADMIN — SUCRALFATE 1 G: 1 SUSPENSION ORAL at 22:10

## 2023-09-28 RX ADMIN — MULTIPLE VITAMINS W/ MINERALS TAB 1 TABLET: TAB at 08:21

## 2023-09-28 RX ADMIN — GABAPENTIN 900 MG: 300 CAPSULE ORAL at 12:16

## 2023-09-28 RX ADMIN — LORAZEPAM 1 MG: 1 TABLET ORAL at 09:45

## 2023-09-28 RX ADMIN — SUCRALFATE 1 G: 1 SUSPENSION ORAL at 09:37

## 2023-09-28 RX ADMIN — SUCRALFATE 1 G: 1 SUSPENSION ORAL at 12:16

## 2023-09-28 RX ADMIN — PANTOPRAZOLE SODIUM 40 MG: 40 TABLET, DELAYED RELEASE ORAL at 16:06

## 2023-09-28 RX ADMIN — SUCRALFATE 1 G: 1 SUSPENSION ORAL at 16:06

## 2023-09-28 RX ADMIN — LORAZEPAM 1 MG: 1 TABLET ORAL at 15:02

## 2023-09-28 RX ADMIN — LORAZEPAM 2 MG: 1 TABLET ORAL at 22:09

## 2023-09-28 RX ADMIN — PANTOPRAZOLE SODIUM 40 MG: 40 TABLET, DELAYED RELEASE ORAL at 09:37

## 2023-09-28 RX ADMIN — FOLIC ACID 1 MG: 1 TABLET ORAL at 08:21

## 2023-09-28 RX ADMIN — HYDROXYZINE HYDROCHLORIDE 50 MG: 25 TABLET, FILM COATED ORAL at 08:28

## 2023-09-28 RX ADMIN — ONDANSETRON 4 MG: 4 TABLET, ORALLY DISINTEGRATING ORAL at 22:47

## 2023-09-28 RX ADMIN — SERTRALINE HYDROCHLORIDE 100 MG: 100 TABLET ORAL at 08:22

## 2023-09-28 RX ADMIN — ONDANSETRON 4 MG: 2 INJECTION INTRAMUSCULAR; INTRAVENOUS at 15:02

## 2023-09-28 RX ADMIN — THIAMINE HCL TAB 100 MG 100 MG: 100 TAB at 08:22

## 2023-09-28 RX ADMIN — LORAZEPAM 1 MG: 1 TABLET ORAL at 19:57

## 2023-09-28 RX ADMIN — GABAPENTIN 900 MG: 300 CAPSULE ORAL at 03:49

## 2023-09-28 RX ADMIN — ACETAMINOPHEN 650 MG: 325 TABLET, FILM COATED ORAL at 08:28

## 2023-09-28 RX ADMIN — ACETAMINOPHEN 650 MG: 325 TABLET, FILM COATED ORAL at 22:47

## 2023-09-28 ASSESSMENT — ACTIVITIES OF DAILY LIVING (ADL)
ADLS_ACUITY_SCORE: 20

## 2023-09-28 NOTE — PLAN OF CARE
"Goal Outcome Evaluation:      Plan of Care Reviewed With: patient        Labs/Protocols: Mag/K/Phos  Vitals: BP (!) 137/97 (BP Location: Left arm, Patient Position: Semi-Ross's, Cuff Size: Adult Regular)   Pulse 91   Temp 98.8  F (37.1  C) (Oral)   Resp 18   Ht 1.803 m (5' 11\")   Wt 79.5 kg (175 lb 3.2 oz)   SpO2 98%   BMI 24.44 kg/m    Cardiac: WNL  Respiratory: WNL  Neuro: A/Ox4 - ativan x2, atarax x1  GI/: nausea, lower abdominal pain (cramps)  Skin: WNL  LDAs: R forearm peripheral - SL   Diet: Regular   Activity: independent /bed alarm on (CIWAS) - call light appropriate   Pain: headache - controlled with tylenol; abdominal cramps - controlled with sucralfate   Plan: follow CIWAS closely, hopefully discharge tomorrow            "

## 2023-09-28 NOTE — PROGRESS NOTES
[facility]    Medicine Progress Note - Hospitalist Service    Date of Admission:  9/26/2023    Primary Care Physician   Physician No Ref-Primary  CONSULTANTS: none     Assessment & Plan     Chuck Magdaleno is a 33 year old male with history of alcoholism and alcohol withdrawal with seizure admitted on 9/26/2023 with complaint of intractable abdominal pain, nausea, vomiting and hematemesis.        #Alcohol dependence with acute alcohol withdrawal  Patient has a history of alcoholism and was sober for 5 months but started drinking a liter of hard alcohol daily for the past 2 weeks prior to admission.  In the past he had been on naltrexone in the past but not taken recently.  He has been getting support from AA and exercising.  Unfortunate patient was lost his job.  Fell off the wagon and started drinking again.  Patient presented being tremulous with hypertension and tachycardia.  He was placed on the alcohol withdrawal protocol and required Neurontin as well as Ativan.  His CIWA score is up to 14.  Patient was placed on vitamins and we are monitoring his electrolytes.  Patient is already started to feel little bit better but has a history of significant withdrawals.  We will be keeping him 1 more night and hopefully he will be able to go home tomorrow.  Need to follow his CIWA scores closely.  Patient knows what he needs to do and is refusing to be seen by chemical dependency.        #Starvation ketosis/ Elevated anion gap metabolic acidosis  -Related to alcohol withdrawal and expected to improve with IV fluids and electrolyte replacement     #Intractable abdominal pain, nausea and vomiting/ Reported hematemesis/ Suspect alcoholic gastritis versus Jessica-Osorio tear/acute blood loss anemia/upper GI bleed  -Patient describes 36 hours of intractable nausea, vomiting, abdominal pain and inability to keep anything down.  Patient had some coffee-ground emesis.  Patient's hemoglobin is followed  and went from 15.9 down to 13.9.  Does not seem to have any ongoing bleeding.  Currently is on Protonix twice a day IV dosing which I will change to oral and will add Carafate.  As he does not appear to be actively bleeding and is hemodynamically stable we will hold off on getting GI involved.  We will expect him to have some component of melena going forward.  On admission the patient's BUN was elevated 18.9 has come down to 8.2.     #Hypophosphatemia  -Replacement ordered     #Elevated WBC  -No infectious complaints suspect this is due to hemoconcentration, white blood cell count was 13.1 on admission came down to 4.6.  This is likely due to stress.     #Anxiety/Depression  -Continue trazodone, hydroxyzine and Zoloft         Discussed plan of care with nursing, discussed with social work/case management as well      Diet: Regular Diet Adult    DVT Prophylaxis: Pneumatic Compression Devices  Eugene Catheter: Not present  Lines: None     Cardiac Monitoring: None    RESTRAINTS: Not indicated  Code Status: Full Code        Follow up plan: The primary needs to be followed up with his primary care provider.     This document was created using voice recognition technology.  Please excuse any typographical errors that may have occurred.  Please call with any questions.          Barrier to discharge: Active alcohol withdrawal    Brenden Truong MD  Hospitalist Service  [facility]  Securely message with [employer] (more info)  Text page via [employer] P[employer]/Directory   ______________________________________________________________________    Interval History   Patient is new to me.  Continues to have alcohol withdrawal with a CIWA score up to 14.  He is less tremulous however per his report.  He he denies any current nausea or vomiting which is much better.  Has not had any melena to this point.      ROS: A comprehensive review of systems was negative except for items noted in the HPI.  Patient currently  denies any fever, chills, sweats, nausea, vomiting, diarrhea, shortness of breath, or chest pain.     Physical Exam   Vital Signs: Temp: 98  F (36.7  C) Temp src: Oral BP: (!) 142/94 Pulse: 67   Resp: 20 SpO2: 97 % O2 Device: None (Room air)    Weight: 175 lbs 3.2 oz    Exam is slightly improved from admission  General appearance: Patient is alert and oriented x3, no apparent distress, pleasant and conversing normally, speaking in full sentences, appears stated age, sitting up in bed  HEENT:   Mucous membranes are moist  RESPIRATORY: Clear to auscultation bilateral, good air movement  CARDIOVASCULAR: Regular rate and rhythm, normal S1/S2, no murmurs, rubs, or gallops present, peripheral pulses intact  GASTROINTESTINAL: Non-distended, non-tender, soft, bowel sounds present throughout  NEUROLOGIC:  Cranial nerves II-XII intact, without any focal deficits, strength 5/5 throughout, mild tremor which is improved  EXTREMITIES:  Moves all extremities, no clubbing, cyanosis, nor edema  :  Eugene not present         Data     I have personally reviewed the following data over the past 24 hrs:    4.6  \   13.9   / 179     140 102 8.2 /  98   4.4 31 (H) 0.93 \       Imaging:   Results for orders placed or performed during the hospital encounter of 03/12/23   Head CT w/o contrast    Narrative    EXAM: CT HEAD W/O CONTRAST, CT CERVICAL SPINE W/O CONTRAST, CTA HEAD NECK W CONTRAST  LOCATION: [facility]  DATE/TIME: 3/12/2023 7:27 PM    INDICATION:  Altered mental status, unequal pupils  COMPARISON:  CT head 05/04/2014.  CONTRAST: 75 mL Isovue 370  TECHNIQUE:   1) Head and neck CT angiogram with IV contrast. Noncontrast head CT followed by axial helical CT images of the head and neck vessels obtained during the arterial phase of intravenous contrast administration. Axial 2D reconstructed images and multiplanar   3D MIP reconstructed images of the head and neck vessels were performed by  the technologist. Dose reduction techniques were used. All stenosis measurements made according to NASCET criteria unless otherwise specified.  2) Routine CT Cervical Spine without IV contrast. Multiplanar reformats. Dose reduction techniques were used.    FINDINGS:   NONCONTRAST HEAD CT:   INTRACRANIAL CONTENTS: No intracranial hemorrhage, extraaxial collection, or mass effect.  No CT evidence of acute infarct. Normal parenchymal attenuation. Normal ventricles and sulci.     VISUALIZED ORBITS/SINUSES/MASTOIDS: No intraorbital abnormality. No significant paranasal sinus mucosal disease. No middle ear or mastoid effusion.    BONES/SOFT TISSUES: No acute abnormality.    HEAD CTA:  Examination degraded by bolus timing.  ANTERIOR CIRCULATION: No stenosis/occlusion, aneurysm, or high flow vascular malformation. Standard [place] anatomy.    POSTERIOR CIRCULATION: No stenosis/occlusion, aneurysm, or high flow vascular malformation. Dominant right and smaller left vertebral artery contribute to a normal basilar artery.     DURAL VENOUS SINUSES: Expected enhancement of the major dural venous sinuses.    NECK CTA:  RIGHT CAROTID: No measurable stenosis or dissection.    LEFT CAROTID: No measurable stenosis or dissection.    VERTEBRAL ARTERIES: No focal stenosis or dissection. Dominant right and smaller left vertebral arteries.    AORTIC ARCH: Classic aortic arch anatomy with no significant stenosis at the origin of the great vessels.    NONVASCULAR STRUCTURES: Unremarkable.    CERVICAL SPINE CT:   VERTEBRA: Normal vertebral body heights and alignment. No acute compression fracture or posttraumatic subluxation.     CANAL/FORAMINA: No significant canal or neural foraminal stenosis.    PARASPINAL: No acute extraspinal abnormality.       Impression    IMPRESSION:   HEAD CT:  1.  No acute intracranial process.    HEAD CTA:   1.  No large vessel occlusion or hemodynamically significant stenosis.    NECK CTA:  1.  No large  vessel occlusion or hemodynamically significant stenosis.    CERVICAL SPINE CT:  1.  No CT evidence for acute fracture or post traumatic subluxation.   CTA Head Neck with Contrast    Narrative    EXAM: CT HEAD W/O CONTRAST, CT CERVICAL SPINE W/O CONTRAST, CTA HEAD NECK W CONTRAST  LOCATION: [facility]  DATE/TIME: 3/12/2023 7:27 PM    INDICATION:  Altered mental status, unequal pupils  COMPARISON:  CT head 05/04/2014.  CONTRAST: 75 mL Isovue 370  TECHNIQUE:   1) Head and neck CT angiogram with IV contrast. Noncontrast head CT followed by axial helical CT images of the head and neck vessels obtained during the arterial phase of intravenous contrast administration. Axial 2D reconstructed images and multiplanar   3D MIP reconstructed images of the head and neck vessels were performed by the technologist. Dose reduction techniques were used. All stenosis measurements made according to NASCET criteria unless otherwise specified.  2) Routine CT Cervical Spine without IV contrast. Multiplanar reformats. Dose reduction techniques were used.    FINDINGS:   NONCONTRAST HEAD CT:   INTRACRANIAL CONTENTS: No intracranial hemorrhage, extraaxial collection, or mass effect.  No CT evidence of acute infarct. Normal parenchymal attenuation. Normal ventricles and sulci.     VISUALIZED ORBITS/SINUSES/MASTOIDS: No intraorbital abnormality. No significant paranasal sinus mucosal disease. No middle ear or mastoid effusion.    BONES/SOFT TISSUES: No acute abnormality.    HEAD CTA:  Examination degraded by bolus timing.  ANTERIOR CIRCULATION: No stenosis/occlusion, aneurysm, or high flow vascular malformation. Standard [place] anatomy.    POSTERIOR CIRCULATION: No stenosis/occlusion, aneurysm, or high flow vascular malformation. Dominant right and smaller left vertebral artery contribute to a normal basilar artery.     DURAL VENOUS SINUSES: Expected enhancement of the major dural venous  sinuses.    NECK CTA:  RIGHT CAROTID: No measurable stenosis or dissection.    LEFT CAROTID: No measurable stenosis or dissection.    VERTEBRAL ARTERIES: No focal stenosis or dissection. Dominant right and smaller left vertebral arteries.    AORTIC ARCH: Classic aortic arch anatomy with no significant stenosis at the origin of the great vessels.    NONVASCULAR STRUCTURES: Unremarkable.    CERVICAL SPINE CT:   VERTEBRA: Normal vertebral body heights and alignment. No acute compression fracture or posttraumatic subluxation.     CANAL/FORAMINA: No significant canal or neural foraminal stenosis.    PARASPINAL: No acute extraspinal abnormality.       Impression    IMPRESSION:   HEAD CT:  1.  No acute intracranial process.    HEAD CTA:   1.  No large vessel occlusion or hemodynamically significant stenosis.    NECK CTA:  1.  No large vessel occlusion or hemodynamically significant stenosis.    CERVICAL SPINE CT:  1.  No CT evidence for acute fracture or post traumatic subluxation.   XR Hand Right G/E 3 Views    Narrative    EXAM: XR HAND RIGHT G/E 3 VIEWS  LOCATION: [facility]  DATE/TIME: 3/12/2023 7:02 PM    INDICATION: Right hand pain.  COMPARISON: 05/04/2014.      Impression    IMPRESSION:   1.  No acute fracture or joint malalignment.  2.  Interval healing of the radius styloid fracture.    Cervical spine CT w/o contrast    Narrative    EXAM: CT HEAD W/O CONTRAST, CT CERVICAL SPINE W/O CONTRAST, CTA HEAD NECK W CONTRAST  LOCATION: [facility]  DATE/TIME: 3/12/2023 7:27 PM    INDICATION:  Altered mental status, unequal pupils  COMPARISON:  CT head 05/04/2014.  CONTRAST: 75 mL Isovue 370  TECHNIQUE:   1) Head and neck CT angiogram with IV contrast. Noncontrast head CT followed by axial helical CT images of the head and neck vessels obtained during the arterial phase of intravenous contrast administration. Axial 2D reconstructed  images and multiplanar   3D MIP reconstructed images of the head and neck vessels were performed by the technologist. Dose reduction techniques were used. All stenosis measurements made according to NASCET criteria unless otherwise specified.  2) Routine CT Cervical Spine without IV contrast. Multiplanar reformats. Dose reduction techniques were used.    FINDINGS:   NONCONTRAST HEAD CT:   INTRACRANIAL CONTENTS: No intracranial hemorrhage, extraaxial collection, or mass effect.  No CT evidence of acute infarct. Normal parenchymal attenuation. Normal ventricles and sulci.     VISUALIZED ORBITS/SINUSES/MASTOIDS: No intraorbital abnormality. No significant paranasal sinus mucosal disease. No middle ear or mastoid effusion.    BONES/SOFT TISSUES: No acute abnormality.    HEAD CTA:  Examination degraded by bolus timing.  ANTERIOR CIRCULATION: No stenosis/occlusion, aneurysm, or high flow vascular malformation. Standard [place] anatomy.    POSTERIOR CIRCULATION: No stenosis/occlusion, aneurysm, or high flow vascular malformation. Dominant right and smaller left vertebral artery contribute to a normal basilar artery.     DURAL VENOUS SINUSES: Expected enhancement of the major dural venous sinuses.    NECK CTA:  RIGHT CAROTID: No measurable stenosis or dissection.    LEFT CAROTID: No measurable stenosis or dissection.    VERTEBRAL ARTERIES: No focal stenosis or dissection. Dominant right and smaller left vertebral arteries.    AORTIC ARCH: Classic aortic arch anatomy with no significant stenosis at the origin of the great vessels.    NONVASCULAR STRUCTURES: Unremarkable.    CERVICAL SPINE CT:   VERTEBRA: Normal vertebral body heights and alignment. No acute compression fracture or posttraumatic subluxation.     CANAL/FORAMINA: No significant canal or neural foraminal stenosis.    PARASPINAL: No acute extraspinal abnormality.       Impression    IMPRESSION:   HEAD CT:  1.  No acute intracranial process.    HEAD CTA:    1.  No large vessel occlusion or hemodynamically significant stenosis.    NECK CTA:  1.  No large vessel occlusion or hemodynamically significant stenosis.    CERVICAL SPINE CT:  1.  No CT evidence for acute fracture or post traumatic subluxation.     Procedures: None    I have personally have reviewed the patient's most up to date radiologic exams, labs, orders, and medications myself

## 2023-09-28 NOTE — PLAN OF CARE
"Care from [id number]    Inpatient Progress Note:  For complete assessment see flow sheet documentation.    /80 (BP Location: Left arm)   Pulse 110   Temp 98.8  F (37.1  C) (Oral)   Resp 20   Ht 1.803 m (5' 11\")   Wt 79.5 kg (175 lb 3.2 oz)   SpO2 97%   BMI 24.44 kg/m         Orientation: A & O x 4  Neuro  Pain status: denies pain  Activity: up SBA  Resp: lungs clear, RA  Cardiac: wnl  GI: wnl  :  voiding in bedside urinal  Skin: wnl  LDA: PIV SL  Pertinent Labs: Mg/ K/ PHOS protocol  Diet: reg  Consults: CIWA 4, 10, 3, 3  Discharge Plan: 9\28\23  Goal Outcome Evaluation:                        "

## 2023-09-29 VITALS
OXYGEN SATURATION: 97 % | RESPIRATION RATE: 18 BRPM | SYSTOLIC BLOOD PRESSURE: 113 MMHG | BODY MASS INDEX: 24.53 KG/M2 | WEIGHT: 175.2 LBS | HEIGHT: 71 IN | TEMPERATURE: 98.2 F | HEART RATE: 67 BPM | DIASTOLIC BLOOD PRESSURE: 73 MMHG

## 2023-09-29 LAB
ANION GAP SERPL CALCULATED.3IONS-SCNC: 9 MMOL/L (ref 7–15)
BUN SERPL-MCNC: 13.7 MG/DL (ref 6–20)
CALCIUM SERPL-MCNC: 9.2 MG/DL (ref 8.6–10)
CHLORIDE SERPL-SCNC: 104 MMOL/L (ref 98–107)
CREAT SERPL-MCNC: 0.86 MG/DL (ref 0.67–1.17)
DEPRECATED HCO3 PLAS-SCNC: 26 MMOL/L (ref 22–29)
EGFRCR SERPLBLD CKD-EPI 2021: >90 ML/MIN/1.73M2
ERYTHROCYTE [DISTWIDTH] IN BLOOD BY AUTOMATED COUNT: 12.6 % (ref 10–15)
GLUCOSE SERPL-MCNC: 96 MG/DL (ref 70–99)
HCT VFR BLD AUTO: 41.1 % (ref 40–53)
HGB BLD-MCNC: 13.8 G/DL (ref 13.3–17.7)
MCH RBC QN AUTO: 29.9 PG (ref 26.5–33)
MCHC RBC AUTO-ENTMCNC: 33.6 G/DL (ref 31.5–36.5)
MCV RBC AUTO: 89 FL (ref 78–100)
PLATELET # BLD AUTO: 190 10E3/UL (ref 150–450)
POTASSIUM SERPL-SCNC: 4.4 MMOL/L (ref 3.4–5.3)
RBC # BLD AUTO: 4.61 10E6/UL (ref 4.4–5.9)
SODIUM SERPL-SCNC: 139 MMOL/L (ref 135–145)
WBC # BLD AUTO: 4.5 10E3/UL (ref 4–11)

## 2023-09-29 PROCEDURE — 250N000013 HC RX MED GY IP 250 OP 250 PS 637: Performed by: INTERNAL MEDICINE

## 2023-09-29 PROCEDURE — 36415 COLL VENOUS BLD VENIPUNCTURE: CPT | Performed by: PHYSICIAN ASSISTANT

## 2023-09-29 PROCEDURE — 99238 HOSP IP/OBS DSCHRG MGMT 30/<: CPT | Performed by: INTERNAL MEDICINE

## 2023-09-29 PROCEDURE — 85027 COMPLETE CBC AUTOMATED: CPT | Performed by: PHYSICIAN ASSISTANT

## 2023-09-29 PROCEDURE — 250N000013 HC RX MED GY IP 250 OP 250 PS 637: Performed by: PHYSICIAN ASSISTANT

## 2023-09-29 PROCEDURE — 82310 ASSAY OF CALCIUM: CPT | Performed by: PHYSICIAN ASSISTANT

## 2023-09-29 RX ORDER — PANTOPRAZOLE SODIUM 40 MG/1
40 TABLET, DELAYED RELEASE ORAL
Qty: 60 TABLET | Refills: 0 | Status: SHIPPED | OUTPATIENT
Start: 2023-09-29 | End: 2023-11-04

## 2023-09-29 RX ORDER — NALTREXONE HYDROCHLORIDE 50 MG/1
50 TABLET, FILM COATED ORAL DAILY
Qty: 30 TABLET | Refills: 0 | Status: SHIPPED | OUTPATIENT
Start: 2023-09-29 | End: 2023-11-04

## 2023-09-29 RX ORDER — SUCRALFATE ORAL 1 G/10ML
1 SUSPENSION ORAL
Qty: 1200 ML | Refills: 0 | Status: SHIPPED | OUTPATIENT
Start: 2023-09-29 | End: 2023-10-13

## 2023-09-29 RX ORDER — LANOLIN ALCOHOL/MO/W.PET/CERES
100 CREAM (GRAM) TOPICAL DAILY
Qty: 30 TABLET | Refills: 0 | Status: ON HOLD | OUTPATIENT
Start: 2023-09-29 | End: 2023-11-11

## 2023-09-29 RX ORDER — FOLIC ACID 1 MG/1
1 TABLET ORAL DAILY
Qty: 30 TABLET | Refills: 0 | Status: ON HOLD | OUTPATIENT
Start: 2023-09-29 | End: 2023-11-11

## 2023-09-29 RX ADMIN — SUCRALFATE 1 G: 1 SUSPENSION ORAL at 11:06

## 2023-09-29 RX ADMIN — MULTIPLE VITAMINS W/ MINERALS TAB 1 TABLET: TAB at 08:41

## 2023-09-29 RX ADMIN — GABAPENTIN 900 MG: 300 CAPSULE ORAL at 11:05

## 2023-09-29 RX ADMIN — SUCRALFATE 1 G: 1 SUSPENSION ORAL at 06:42

## 2023-09-29 RX ADMIN — THIAMINE HCL TAB 100 MG 100 MG: 100 TAB at 08:41

## 2023-09-29 RX ADMIN — HYDROXYZINE HYDROCHLORIDE 50 MG: 25 TABLET, FILM COATED ORAL at 11:05

## 2023-09-29 RX ADMIN — SERTRALINE HYDROCHLORIDE 100 MG: 100 TABLET ORAL at 08:41

## 2023-09-29 RX ADMIN — GABAPENTIN 900 MG: 300 CAPSULE ORAL at 04:02

## 2023-09-29 RX ADMIN — FOLIC ACID 1 MG: 1 TABLET ORAL at 08:42

## 2023-09-29 RX ADMIN — PANTOPRAZOLE SODIUM 40 MG: 40 TABLET, DELAYED RELEASE ORAL at 06:42

## 2023-09-29 ASSESSMENT — ACTIVITIES OF DAILY LIVING (ADL)
ADLS_ACUITY_SCORE: 20

## 2023-09-29 NOTE — CARE PLAN
VSS denies pain. CIWA 3. On Gabapentin taper. Plan for discharge this morning. Went over discharge paperwork with pt. Questions asked and answered. Verbalized understanding. Waiting for discharge meds.

## 2023-09-29 NOTE — DISCHARGE SUMMARY
[facility]  Hospitalist Discharge Summary      Date of Admission:  9/26/2023  Date of Discharge:  9/29/2023  Discharging Provider: Brenden Truong MD  Discharge Service: Hospitalist Service  Primary Care Physician   Physician No Ref-Primary    Discharge Diagnoses   Alcohol dependence with acute alcohol withdrawal  Starvation ketosis  Elevated anion gap metabolic acidosis  Intractable abdominal pain with hematic emesis  Suspected alcohol gastritis versus Jessica-Osorio tear  Upper GI bleed  Acute blood loss anemia  Hypophosphatemia  Leukocytosis  Anxiety and depression    Hospital Course   Chuck Magdaleno is a 33 year old male with history of alcoholism and alcohol withdrawal with seizure admitted on 9/26/2023 with complaint of intractable abdominal pain, nausea, vomiting and hematemesis.        #Alcohol dependence with acute alcohol withdrawal  Patient has a history of alcoholism and was sober for 5 months but started drinking a liter of hard alcohol daily for the past 2 weeks prior to admission.  In the past, he had been on naltrexone in the past but not taken recently, this will be reordered at discharge.  He has been getting support from  and exercising.  Unfortunately, the patient was lost his job,  Fell off the wagon, and started drinking again.  Patient presented being tremulous with hypertension and tachycardia.  He was placed on the alcohol withdrawal protocol and required Neurontin as well as Ativan.  His CIWA score was up to 14.  Patient was placed on vitamins and we monitored his electrolytes.  Over time the patient did improve.  Was able to take oral fluids as well as food without difficulty.  Patient refused to see chemical dependency and understands what he needs to do to stay sober.  He is already been getting in contact with his resources per his report prior to discharge.     b        #Starvation ketosis/ Elevated anion gap metabolic acidosis  -Related to alcohol  withdrawal and expected to improve with IV fluids and electrolyte replacement     #Intractable abdominal pain, nausea and vomiting/ Reported hematemesis/ Suspect alcoholic gastritis versus Jessica-Osorio tear/acute blood loss anemia/upper GI bleed  -Patient describes 36 hours of intractable nausea, vomiting, abdominal pain and inability to keep anything down.  Patient had some coffee-ground emesis.  Patient's hemoglobin is followed and went from 15.9 down to 13.9.  Some of this could be dilutional with IV fluids as well.  Does not seem to have any ongoing bleeding.  Currently is on Protonix twice a day IV dosing which was changed to oral and was given Carafate.  As he does not appear to be actively bleeding and is hemodynamically stable we will hold off on getting GI involved.  We will expect him to have some component of melena going forward.  On admission the patient's BUN was elevated 18.9 has come down to 8.2 but was up to 13.7 at discharge.  At the time of discharge his hemoglobin was stable at 13.8     #Hypophosphatemia  -Replacement ordered     #Elevated WBC  -No infectious complaints suspect this is due to hemoconcentration, white blood cell count was 13.1 on admission came down to 4.6.  This is likely due to stress.  White blood cell count was 4.5 at discharge.     #Anxiety/Depression  -Continue trazodone, hydroxyzine and Zoloft       Clinically Significant Risk Factors          Significant Results and Procedures   Most Recent 3 CBC's:  Recent Labs   Lab Test 09/29/23 0624 09/28/23  0646 09/27/23  0754   WBC 4.5 4.6 4.2   HGB 13.8 13.9 13.7   MCV 89 90 87    179 204     Most Recent 3 BMP's:  Recent Labs   Lab Test 09/29/23  0624 09/28/23  0646 09/27/23  0754    140 141   POTASSIUM 4.4 4.4 3.4   CHLORIDE 104 102 99   CO2 26 31* 31*   BUN 13.7 8.2 7.4   CR 0.86 0.93 0.92   ANIONGAP 9 7 11   TEO 9.2 9.3 9.2   GLC 96 98 94   ,   Results for orders placed or performed during the hospital encounter  of 03/12/23   Head CT w/o contrast    Narrative    EXAM: CT HEAD W/O CONTRAST, CT CERVICAL SPINE W/O CONTRAST, CTA HEAD NECK W CONTRAST  LOCATION: [facility]  DATE/TIME: 3/12/2023 7:27 PM    INDICATION:  Altered mental status, unequal pupils  COMPARISON:  CT head 05/04/2014.  CONTRAST: 75 mL Isovue 370  TECHNIQUE:   1) Head and neck CT angiogram with IV contrast. Noncontrast head CT followed by axial helical CT images of the head and neck vessels obtained during the arterial phase of intravenous contrast administration. Axial 2D reconstructed images and multiplanar   3D MIP reconstructed images of the head and neck vessels were performed by the technologist. Dose reduction techniques were used. All stenosis measurements made according to NASCET criteria unless otherwise specified.  2) Routine CT Cervical Spine without IV contrast. Multiplanar reformats. Dose reduction techniques were used.    FINDINGS:   NONCONTRAST HEAD CT:   INTRACRANIAL CONTENTS: No intracranial hemorrhage, extraaxial collection, or mass effect.  No CT evidence of acute infarct. Normal parenchymal attenuation. Normal ventricles and sulci.     VISUALIZED ORBITS/SINUSES/MASTOIDS: No intraorbital abnormality. No significant paranasal sinus mucosal disease. No middle ear or mastoid effusion.    BONES/SOFT TISSUES: No acute abnormality.    HEAD CTA:  Examination degraded by bolus timing.  ANTERIOR CIRCULATION: No stenosis/occlusion, aneurysm, or high flow vascular malformation. Standard [place] anatomy.    POSTERIOR CIRCULATION: No stenosis/occlusion, aneurysm, or high flow vascular malformation. Dominant right and smaller left vertebral artery contribute to a normal basilar artery.     DURAL VENOUS SINUSES: Expected enhancement of the major dural venous sinuses.    NECK CTA:  RIGHT CAROTID: No measurable stenosis or dissection.    LEFT CAROTID: No measurable stenosis or dissection.    VERTEBRAL  ARTERIES: No focal stenosis or dissection. Dominant right and smaller left vertebral arteries.    AORTIC ARCH: Classic aortic arch anatomy with no significant stenosis at the origin of the great vessels.    NONVASCULAR STRUCTURES: Unremarkable.    CERVICAL SPINE CT:   VERTEBRA: Normal vertebral body heights and alignment. No acute compression fracture or posttraumatic subluxation.     CANAL/FORAMINA: No significant canal or neural foraminal stenosis.    PARASPINAL: No acute extraspinal abnormality.       Impression    IMPRESSION:   HEAD CT:  1.  No acute intracranial process.    HEAD CTA:   1.  No large vessel occlusion or hemodynamically significant stenosis.    NECK CTA:  1.  No large vessel occlusion or hemodynamically significant stenosis.    CERVICAL SPINE CT:  1.  No CT evidence for acute fracture or post traumatic subluxation.   CTA Head Neck with Contrast    Narrative    EXAM: CT HEAD W/O CONTRAST, CT CERVICAL SPINE W/O CONTRAST, CTA HEAD NECK W CONTRAST  LOCATION: [facility]  DATE/TIME: 3/12/2023 7:27 PM    INDICATION:  Altered mental status, unequal pupils  COMPARISON:  CT head 05/04/2014.  CONTRAST: 75 mL Isovue 370  TECHNIQUE:   1) Head and neck CT angiogram with IV contrast. Noncontrast head CT followed by axial helical CT images of the head and neck vessels obtained during the arterial phase of intravenous contrast administration. Axial 2D reconstructed images and multiplanar   3D MIP reconstructed images of the head and neck vessels were performed by the technologist. Dose reduction techniques were used. All stenosis measurements made according to NASCET criteria unless otherwise specified.  2) Routine CT Cervical Spine without IV contrast. Multiplanar reformats. Dose reduction techniques were used.    FINDINGS:   NONCONTRAST HEAD CT:   INTRACRANIAL CONTENTS: No intracranial hemorrhage, extraaxial collection, or mass effect.  No CT evidence of acute  infarct. Normal parenchymal attenuation. Normal ventricles and sulci.     VISUALIZED ORBITS/SINUSES/MASTOIDS: No intraorbital abnormality. No significant paranasal sinus mucosal disease. No middle ear or mastoid effusion.    BONES/SOFT TISSUES: No acute abnormality.    HEAD CTA:  Examination degraded by bolus timing.  ANTERIOR CIRCULATION: No stenosis/occlusion, aneurysm, or high flow vascular malformation. Standard [place] anatomy.    POSTERIOR CIRCULATION: No stenosis/occlusion, aneurysm, or high flow vascular malformation. Dominant right and smaller left vertebral artery contribute to a normal basilar artery.     DURAL VENOUS SINUSES: Expected enhancement of the major dural venous sinuses.    NECK CTA:  RIGHT CAROTID: No measurable stenosis or dissection.    LEFT CAROTID: No measurable stenosis or dissection.    VERTEBRAL ARTERIES: No focal stenosis or dissection. Dominant right and smaller left vertebral arteries.    AORTIC ARCH: Classic aortic arch anatomy with no significant stenosis at the origin of the great vessels.    NONVASCULAR STRUCTURES: Unremarkable.    CERVICAL SPINE CT:   VERTEBRA: Normal vertebral body heights and alignment. No acute compression fracture or posttraumatic subluxation.     CANAL/FORAMINA: No significant canal or neural foraminal stenosis.    PARASPINAL: No acute extraspinal abnormality.       Impression    IMPRESSION:   HEAD CT:  1.  No acute intracranial process.    HEAD CTA:   1.  No large vessel occlusion or hemodynamically significant stenosis.    NECK CTA:  1.  No large vessel occlusion or hemodynamically significant stenosis.    CERVICAL SPINE CT:  1.  No CT evidence for acute fracture or post traumatic subluxation.   XR Hand Right G/E 3 Views    Narrative    EXAM: XR HAND RIGHT G/E 3 VIEWS  LOCATION: [facility]  DATE/TIME: 3/12/2023 7:02 PM    INDICATION: Right hand pain.  COMPARISON: 05/04/2014.      Impression     IMPRESSION:   1.  No acute fracture or joint malalignment.  2.  Interval healing of the radius styloid fracture.    Cervical spine CT w/o contrast    Narrative    EXAM: CT HEAD W/O CONTRAST, CT CERVICAL SPINE W/O CONTRAST, CTA HEAD NECK W CONTRAST  LOCATION: [facility]  DATE/TIME: 3/12/2023 7:27 PM    INDICATION:  Altered mental status, unequal pupils  COMPARISON:  CT head 05/04/2014.  CONTRAST: 75 mL Isovue 370  TECHNIQUE:   1) Head and neck CT angiogram with IV contrast. Noncontrast head CT followed by axial helical CT images of the head and neck vessels obtained during the arterial phase of intravenous contrast administration. Axial 2D reconstructed images and multiplanar   3D MIP reconstructed images of the head and neck vessels were performed by the technologist. Dose reduction techniques were used. All stenosis measurements made according to NASCET criteria unless otherwise specified.  2) Routine CT Cervical Spine without IV contrast. Multiplanar reformats. Dose reduction techniques were used.    FINDINGS:   NONCONTRAST HEAD CT:   INTRACRANIAL CONTENTS: No intracranial hemorrhage, extraaxial collection, or mass effect.  No CT evidence of acute infarct. Normal parenchymal attenuation. Normal ventricles and sulci.     VISUALIZED ORBITS/SINUSES/MASTOIDS: No intraorbital abnormality. No significant paranasal sinus mucosal disease. No middle ear or mastoid effusion.    BONES/SOFT TISSUES: No acute abnormality.    HEAD CTA:  Examination degraded by bolus timing.  ANTERIOR CIRCULATION: No stenosis/occlusion, aneurysm, or high flow vascular malformation. Standard [place] anatomy.    POSTERIOR CIRCULATION: No stenosis/occlusion, aneurysm, or high flow vascular malformation. Dominant right and smaller left vertebral artery contribute to a normal basilar artery.     DURAL VENOUS SINUSES: Expected enhancement of the major dural venous sinuses.    NECK CTA:  RIGHT  CAROTID: No measurable stenosis or dissection.    LEFT CAROTID: No measurable stenosis or dissection.    VERTEBRAL ARTERIES: No focal stenosis or dissection. Dominant right and smaller left vertebral arteries.    AORTIC ARCH: Classic aortic arch anatomy with no significant stenosis at the origin of the great vessels.    NONVASCULAR STRUCTURES: Unremarkable.    CERVICAL SPINE CT:   VERTEBRA: Normal vertebral body heights and alignment. No acute compression fracture or posttraumatic subluxation.     CANAL/FORAMINA: No significant canal or neural foraminal stenosis.    PARASPINAL: No acute extraspinal abnormality.       Impression    IMPRESSION:   HEAD CT:  1.  No acute intracranial process.    HEAD CTA:   1.  No large vessel occlusion or hemodynamically significant stenosis.    NECK CTA:  1.  No large vessel occlusion or hemodynamically significant stenosis.    CERVICAL SPINE CT:  1.  No CT evidence for acute fracture or post traumatic subluxation.           Follow up/instructions: Follow-up with his primary care provider in a week rechecking his hemoglobin and making sure he continues to stay sober    Pending test results at discharge:     Unresulted Labs Ordered in the Past 30 Days of this Admission       No orders found from 8/27/2023 to 9/27/2023.             Discharge Orders      Reason for your hospital stay    Alcohol withdrawal     Follow-up and recommended labs and tests     Follow up with primary care provider, Physician No Ref-Primary, within 7 days for hospital follow- up.  The following labs/tests are recommended: hemoglobin.     Activity    Your activity upon discharge: activity as tolerated    STAY SOBER OFF ALL ALCOHOL!!!!!     Diet    Follow this diet upon discharge: Orders Placed This Encounter      Regular Diet Adult       Discharge Disposition   Discharged to home  Condition at discharge: Stable      Consultations This Hospital Stay   None    Code Status   Full Code    Time Spent on this Encounter    I, Brenden Truong MD, personally saw the patient today and spent less than or equal to 30 minutes discharging this patient.  Discussed with bedside nursing, social work, and case management          This document was created using voice recognition technology.  Please excuse any typographical errors that may have occurred.  Please call with any questions.       Brenden Truong MD  [place] [address] [facility]  [address]  [facility] [address]  Phone: [phone]  Fax: [phone]  ______________________________________________________________________    Physical Exam   Vital Signs: Temp: 98.2  F (36.8  C) Temp src: Oral BP: 113/73 Pulse: 67   Resp: 18 SpO2: 97 % O2 Device: None (Room air)    Weight: 175 lbs 3.2 oz    Exam is improved and no longer seems to be going through withdrawal General appearance: Patient is alert and oriented x3, no apparent distress, pleasant and conversing normally, speaking in full sentences, appears stated age, laying in bed, finally slept well per his report HEENT:   Mucous membranes are moist  RESPIRATORY: Clear to auscultation bilateral, good air movement  CARDIOVASCULAR: Regular rate and rhythm, normal S1/S2, no murmurs, rubs, or gallops present, peripheral pulses intact  GASTROINTESTINAL: Non-distended, non-tender, soft, bowel sounds present throughout  NEUROLOGIC:  Cranial nerves II-XII intact, without any focal deficits, strength 5/5 throughout, mild tremor which is improved  EXTREMITIES:  Moves all extremities, no clubbing, cyanosis, nor edema  :  [place] not present            Discharge Medications   Current Discharge Medication List        START taking these medications    Details   folic acid (FOLVITE) 1 MG tablet Take 1 tablet (1 mg) by mouth daily  Qty: 30 tablet, Refills: 0    Associated Diagnoses: Alcohol dependence with withdrawal, uncomplicated (H)      pantoprazole (PROTONIX) 40 MG EC tablet Take 1 tablet (40 mg) by mouth 2 times  daily (before meals)  Qty: 60 tablet, Refills: 0    Associated Diagnoses: Jessica-Osorio tear      sucralfate (CARAFATE) 1 GM/10ML suspension Take 10 mLs (1 g) by mouth 4 times daily (before meals and nightly) for 30 days  Qty: 1200 mL, Refills: 0    Associated Diagnoses: Alcohol dependence with withdrawal, uncomplicated (H)      thiamine (B-1) 100 MG tablet Take 1 tablet (100 mg) by mouth daily  Qty: 30 tablet, Refills: 0    Associated Diagnoses: Alcohol dependence with withdrawal, uncomplicated (H)           CONTINUE these medications which have CHANGED    Details   naltrexone (DEPADE/REVIA) 50 MG tablet Take 1 tablet (50 mg) by mouth daily  Qty: 30 tablet, Refills: 0    Associated Diagnoses: Alcohol dependence with withdrawal, uncomplicated (H)           CONTINUE these medications which have NOT CHANGED    Details   hydrOXYzine (VISTARIL) 50 MG capsule Take 50 mg by mouth 3 times daily as needed for anxiety      sertraline (ZOLOFT) 100 MG tablet Take 100 mg by mouth daily      traZODone (DESYREL) 100 MG tablet Take 100 mg by mouth nightly as needed for sleep           Allergies   No Known Allergies     Finasteride Counseling:  I discussed with the patient the risks of use of finasteride including but not limited to decreased libido, decreased ejaculate volume, gynecomastia, and depression. Women should not handle medication.  All of the patient's questions and concerns were addressed. Finasteride Male Counseling: Finasteride Counseling:  I discussed with the patient the risks of use of finasteride including but not limited to decreased libido, decreased ejaculate volume, gynecomastia, and depression. Women should not handle medication.  All of the patient's questions and concerns were addressed.

## 2023-10-13 ENCOUNTER — HOSPITAL ENCOUNTER (INPATIENT)
Facility: CLINIC | Age: 33
LOS: 2 days | Discharge: LEFT BEFORE DISCHARGE | DRG: 894 | End: 2023-10-15
Attending: EMERGENCY MEDICINE | Admitting: HOSPITALIST
Payer: COMMERCIAL

## 2023-10-13 DIAGNOSIS — F10.930 ALCOHOL WITHDRAWAL, UNCOMPLICATED (H): ICD-10-CM

## 2023-10-13 DIAGNOSIS — Z86.59 HISTORY OF SEIZURE DUE TO ALCOHOL WITHDRAWAL: ICD-10-CM

## 2023-10-13 DIAGNOSIS — Z87.898 HISTORY OF SEIZURE DUE TO ALCOHOL WITHDRAWAL: ICD-10-CM

## 2023-10-13 PROBLEM — F10.929 ALCOHOL INTOXICATION, WITH UNSPECIFIED COMPLICATION (H): Status: ACTIVE | Noted: 2023-10-13

## 2023-10-13 LAB
ABO/RH(D): NORMAL
ALBUMIN SERPL BCG-MCNC: 5.1 G/DL (ref 3.5–5.2)
ALP SERPL-CCNC: 74 U/L (ref 40–129)
ALT SERPL W P-5'-P-CCNC: 37 U/L (ref 0–70)
ANION GAP SERPL CALCULATED.3IONS-SCNC: 23 MMOL/L (ref 7–15)
ANTIBODY SCREEN: NEGATIVE
AST SERPL W P-5'-P-CCNC: 33 U/L (ref 0–45)
B-OH-BUTYR SERPL-SCNC: 0.4 MMOL/L
BASO+EOS+MONOS # BLD AUTO: NORMAL 10*3/UL
BASO+EOS+MONOS NFR BLD AUTO: NORMAL %
BASOPHILS # BLD AUTO: 0 10E3/UL (ref 0–0.2)
BASOPHILS NFR BLD AUTO: 1 %
BILIRUB SERPL-MCNC: 0.4 MG/DL
BUN SERPL-MCNC: 12 MG/DL (ref 6–20)
CALCIUM SERPL-MCNC: 9.5 MG/DL (ref 8.6–10)
CHLORIDE SERPL-SCNC: 97 MMOL/L (ref 98–107)
CREAT SERPL-MCNC: 0.8 MG/DL (ref 0.67–1.17)
DEPRECATED HCO3 PLAS-SCNC: 21 MMOL/L (ref 22–29)
EGFRCR SERPLBLD CKD-EPI 2021: >90 ML/MIN/1.73M2
EOSINOPHIL # BLD AUTO: 0 10E3/UL (ref 0–0.7)
EOSINOPHIL NFR BLD AUTO: 0 %
ERYTHROCYTE [DISTWIDTH] IN BLOOD BY AUTOMATED COUNT: 12.8 % (ref 10–15)
ETHANOL SERPL-MCNC: 0.38 G/DL
GLUCOSE SERPL-MCNC: 91 MG/DL (ref 70–99)
HCT VFR BLD AUTO: 44.8 % (ref 40–53)
HGB BLD-MCNC: 15.5 G/DL (ref 13.3–17.7)
HOLD SPECIMEN: NORMAL
HOLD SPECIMEN: NORMAL
IMM GRANULOCYTES # BLD: 0 10E3/UL
IMM GRANULOCYTES NFR BLD: 0 %
LACTATE SERPL-SCNC: 3.3 MMOL/L (ref 0.7–2)
LACTATE SERPL-SCNC: 4.7 MMOL/L (ref 0.7–2)
LACTATE SERPL-SCNC: 6.5 MMOL/L (ref 0.7–2)
LYMPHOCYTES # BLD AUTO: 1.7 10E3/UL (ref 0.8–5.3)
LYMPHOCYTES NFR BLD AUTO: 29 %
MAGNESIUM SERPL-MCNC: 1.8 MG/DL (ref 1.7–2.3)
MCH RBC QN AUTO: 29.6 PG (ref 26.5–33)
MCHC RBC AUTO-ENTMCNC: 34.6 G/DL (ref 31.5–36.5)
MCV RBC AUTO: 86 FL (ref 78–100)
MONOCYTES # BLD AUTO: 0.3 10E3/UL (ref 0–1.3)
MONOCYTES NFR BLD AUTO: 6 %
NEUTROPHILS # BLD AUTO: 3.8 10E3/UL (ref 1.6–8.3)
NEUTROPHILS NFR BLD AUTO: 64 %
NRBC # BLD AUTO: 0 10E3/UL
NRBC BLD AUTO-RTO: 0 /100
PHOSPHATE SERPL-MCNC: 2.3 MG/DL (ref 2.5–4.5)
PLATELET # BLD AUTO: 419 10E3/UL (ref 150–450)
POTASSIUM SERPL-SCNC: 3.7 MMOL/L (ref 3.4–5.3)
PROT SERPL-MCNC: 7.7 G/DL (ref 6.4–8.3)
RBC # BLD AUTO: 5.23 10E6/UL (ref 4.4–5.9)
SODIUM SERPL-SCNC: 141 MMOL/L (ref 135–145)
SPECIMEN EXPIRATION DATE: NORMAL
WBC # BLD AUTO: 5.8 10E3/UL (ref 4–11)

## 2023-10-13 PROCEDURE — 96375 TX/PRO/DX INJ NEW DRUG ADDON: CPT

## 2023-10-13 PROCEDURE — 84100 ASSAY OF PHOSPHORUS: CPT | Performed by: PHYSICIAN ASSISTANT

## 2023-10-13 PROCEDURE — 82010 KETONE BODYS QUAN: CPT | Performed by: EMERGENCY MEDICINE

## 2023-10-13 PROCEDURE — 83605 ASSAY OF LACTIC ACID: CPT | Performed by: EMERGENCY MEDICINE

## 2023-10-13 PROCEDURE — 258N000003 HC RX IP 258 OP 636: Performed by: PHYSICIAN ASSISTANT

## 2023-10-13 PROCEDURE — 83735 ASSAY OF MAGNESIUM: CPT | Performed by: EMERGENCY MEDICINE

## 2023-10-13 PROCEDURE — 99223 1ST HOSP IP/OBS HIGH 75: CPT | Performed by: PHYSICIAN ASSISTANT

## 2023-10-13 PROCEDURE — 99207 PR NO BILLABLE SERVICE THIS VISIT: CPT | Performed by: HOSPITALIST

## 2023-10-13 PROCEDURE — 250N000009 HC RX 250: Performed by: PHYSICIAN ASSISTANT

## 2023-10-13 PROCEDURE — 86900 BLOOD TYPING SEROLOGIC ABO: CPT | Performed by: EMERGENCY MEDICINE

## 2023-10-13 PROCEDURE — 96361 HYDRATE IV INFUSION ADD-ON: CPT

## 2023-10-13 PROCEDURE — 36415 COLL VENOUS BLD VENIPUNCTURE: CPT | Performed by: EMERGENCY MEDICINE

## 2023-10-13 PROCEDURE — 93005 ELECTROCARDIOGRAM TRACING: CPT

## 2023-10-13 PROCEDURE — 83605 ASSAY OF LACTIC ACID: CPT | Performed by: PHYSICIAN ASSISTANT

## 2023-10-13 PROCEDURE — 85004 AUTOMATED DIFF WBC COUNT: CPT | Performed by: EMERGENCY MEDICINE

## 2023-10-13 PROCEDURE — 120N000001 HC R&B MED SURG/OB

## 2023-10-13 PROCEDURE — 258N000003 HC RX IP 258 OP 636: Performed by: EMERGENCY MEDICINE

## 2023-10-13 PROCEDURE — 96374 THER/PROPH/DIAG INJ IV PUSH: CPT

## 2023-10-13 PROCEDURE — C9113 INJ PANTOPRAZOLE SODIUM, VIA: HCPCS | Mod: JZ | Performed by: EMERGENCY MEDICINE

## 2023-10-13 PROCEDURE — 80053 COMPREHEN METABOLIC PANEL: CPT | Performed by: EMERGENCY MEDICINE

## 2023-10-13 PROCEDURE — 250N000011 HC RX IP 250 OP 636: Performed by: EMERGENCY MEDICINE

## 2023-10-13 PROCEDURE — HZ2ZZZZ DETOXIFICATION SERVICES FOR SUBSTANCE ABUSE TREATMENT: ICD-10-PCS | Performed by: HOSPITALIST

## 2023-10-13 PROCEDURE — 82077 ASSAY SPEC XCP UR&BREATH IA: CPT | Performed by: EMERGENCY MEDICINE

## 2023-10-13 PROCEDURE — 99285 EMERGENCY DEPT VISIT HI MDM: CPT

## 2023-10-13 PROCEDURE — 36415 COLL VENOUS BLD VENIPUNCTURE: CPT | Performed by: PHYSICIAN ASSISTANT

## 2023-10-13 PROCEDURE — 250N000011 HC RX IP 250 OP 636: Performed by: PHYSICIAN ASSISTANT

## 2023-10-13 PROCEDURE — 250N000013 HC RX MED GY IP 250 OP 250 PS 637: Performed by: PHYSICIAN ASSISTANT

## 2023-10-13 RX ORDER — ACETAMINOPHEN 650 MG/1
650 SUPPOSITORY RECTAL EVERY 6 HOURS PRN
Status: DISCONTINUED | OUTPATIENT
Start: 2023-10-13 | End: 2023-10-15 | Stop reason: HOSPADM

## 2023-10-13 RX ORDER — GABAPENTIN 600 MG/1
1200 TABLET ORAL ONCE
Status: COMPLETED | OUTPATIENT
Start: 2023-10-13 | End: 2023-10-13

## 2023-10-13 RX ORDER — LORAZEPAM 2 MG/ML
1-2 INJECTION INTRAMUSCULAR EVERY 30 MIN PRN
Status: DISCONTINUED | OUTPATIENT
Start: 2023-10-13 | End: 2023-10-15 | Stop reason: HOSPADM

## 2023-10-13 RX ORDER — GABAPENTIN 300 MG/1
900 CAPSULE ORAL EVERY 8 HOURS
Qty: 27 CAPSULE | Refills: 0 | Status: DISCONTINUED | OUTPATIENT
Start: 2023-10-14 | End: 2023-10-15 | Stop reason: HOSPADM

## 2023-10-13 RX ORDER — FLUMAZENIL 0.1 MG/ML
0.2 INJECTION, SOLUTION INTRAVENOUS
Status: DISCONTINUED | OUTPATIENT
Start: 2023-10-13 | End: 2023-10-15 | Stop reason: HOSPADM

## 2023-10-13 RX ORDER — AMOXICILLIN 250 MG
2 CAPSULE ORAL 2 TIMES DAILY PRN
Status: DISCONTINUED | OUTPATIENT
Start: 2023-10-13 | End: 2023-10-15 | Stop reason: HOSPADM

## 2023-10-13 RX ORDER — GABAPENTIN 100 MG/1
100 CAPSULE ORAL EVERY 8 HOURS
Qty: 9 CAPSULE | Refills: 0 | Status: DISCONTINUED | OUTPATIENT
Start: 2023-10-21 | End: 2023-10-15 | Stop reason: HOSPADM

## 2023-10-13 RX ORDER — CLONIDINE HYDROCHLORIDE 0.1 MG/1
0.1 TABLET ORAL EVERY 8 HOURS
Status: DISCONTINUED | OUTPATIENT
Start: 2023-10-13 | End: 2023-10-15 | Stop reason: HOSPADM

## 2023-10-13 RX ORDER — ACETAMINOPHEN 325 MG/1
650 TABLET ORAL EVERY 6 HOURS PRN
Status: DISCONTINUED | OUTPATIENT
Start: 2023-10-13 | End: 2023-10-15 | Stop reason: HOSPADM

## 2023-10-13 RX ORDER — PANTOPRAZOLE SODIUM 40 MG/1
40 TABLET, DELAYED RELEASE ORAL
Status: DISCONTINUED | OUTPATIENT
Start: 2023-10-13 | End: 2023-10-15 | Stop reason: HOSPADM

## 2023-10-13 RX ORDER — LORAZEPAM 1 MG/1
1-2 TABLET ORAL EVERY 30 MIN PRN
Status: DISCONTINUED | OUTPATIENT
Start: 2023-10-13 | End: 2023-10-15 | Stop reason: HOSPADM

## 2023-10-13 RX ORDER — POLYETHYLENE GLYCOL 3350 17 G/17G
17 POWDER, FOR SOLUTION ORAL DAILY PRN
Status: DISCONTINUED | OUTPATIENT
Start: 2023-10-13 | End: 2023-10-15 | Stop reason: HOSPADM

## 2023-10-13 RX ORDER — ONDANSETRON 2 MG/ML
4 INJECTION INTRAMUSCULAR; INTRAVENOUS EVERY 6 HOURS PRN
Status: DISCONTINUED | OUTPATIENT
Start: 2023-10-13 | End: 2023-10-15 | Stop reason: HOSPADM

## 2023-10-13 RX ORDER — GABAPENTIN 300 MG/1
600 CAPSULE ORAL EVERY 8 HOURS
Qty: 12 CAPSULE | Refills: 0 | Status: DISCONTINUED | OUTPATIENT
Start: 2023-10-17 | End: 2023-10-15 | Stop reason: HOSPADM

## 2023-10-13 RX ORDER — OLANZAPINE 10 MG/1
5 INJECTION, POWDER, LYOPHILIZED, FOR SOLUTION INTRAMUSCULAR ONCE
Status: COMPLETED | OUTPATIENT
Start: 2023-10-13 | End: 2023-10-13

## 2023-10-13 RX ORDER — OLANZAPINE 5 MG/1
5-10 TABLET, ORALLY DISINTEGRATING ORAL EVERY 6 HOURS PRN
Status: DISCONTINUED | OUTPATIENT
Start: 2023-10-13 | End: 2023-10-15 | Stop reason: HOSPADM

## 2023-10-13 RX ORDER — SODIUM CHLORIDE, SODIUM LACTATE, POTASSIUM CHLORIDE, CALCIUM CHLORIDE 600; 310; 30; 20 MG/100ML; MG/100ML; MG/100ML; MG/100ML
INJECTION, SOLUTION INTRAVENOUS CONTINUOUS
Status: DISCONTINUED | OUTPATIENT
Start: 2023-10-13 | End: 2023-10-14

## 2023-10-13 RX ORDER — AMOXICILLIN 250 MG
1 CAPSULE ORAL 2 TIMES DAILY PRN
Status: DISCONTINUED | OUTPATIENT
Start: 2023-10-13 | End: 2023-10-15 | Stop reason: HOSPADM

## 2023-10-13 RX ORDER — HALOPERIDOL 5 MG/ML
2.5-5 INJECTION INTRAMUSCULAR EVERY 6 HOURS PRN
Status: DISCONTINUED | OUTPATIENT
Start: 2023-10-13 | End: 2023-10-15 | Stop reason: HOSPADM

## 2023-10-13 RX ORDER — ONDANSETRON 4 MG/1
4 TABLET, ORALLY DISINTEGRATING ORAL EVERY 6 HOURS PRN
Status: DISCONTINUED | OUTPATIENT
Start: 2023-10-13 | End: 2023-10-15 | Stop reason: HOSPADM

## 2023-10-13 RX ORDER — THIAMINE HYDROCHLORIDE 100 MG/ML
100 INJECTION, SOLUTION INTRAMUSCULAR; INTRAVENOUS ONCE
Status: COMPLETED | OUTPATIENT
Start: 2023-10-13 | End: 2023-10-13

## 2023-10-13 RX ORDER — HYDROXYZINE HYDROCHLORIDE 25 MG/1
50 TABLET, FILM COATED ORAL 3 TIMES DAILY PRN
Status: DISCONTINUED | OUTPATIENT
Start: 2023-10-13 | End: 2023-10-15 | Stop reason: HOSPADM

## 2023-10-13 RX ORDER — GABAPENTIN 100 MG/1
300 CAPSULE ORAL EVERY 8 HOURS
Qty: 18 CAPSULE | Refills: 0 | Status: DISCONTINUED | OUTPATIENT
Start: 2023-10-19 | End: 2023-10-15 | Stop reason: HOSPADM

## 2023-10-13 RX ADMIN — LORAZEPAM 1 MG: 1 TABLET ORAL at 20:33

## 2023-10-13 RX ADMIN — LORAZEPAM 2 MG: 2 INJECTION INTRAMUSCULAR; INTRAVENOUS at 17:34

## 2023-10-13 RX ADMIN — ONDANSETRON 4 MG: 4 TABLET, ORALLY DISINTEGRATING ORAL at 20:33

## 2023-10-13 RX ADMIN — PANTOPRAZOLE SODIUM 40 MG: 40 INJECTION, POWDER, FOR SOLUTION INTRAVENOUS at 13:59

## 2023-10-13 RX ADMIN — THIAMINE HYDROCHLORIDE 100 MG: 100 INJECTION, SOLUTION INTRAMUSCULAR; INTRAVENOUS at 14:37

## 2023-10-13 RX ADMIN — SODIUM CHLORIDE 1000 ML: 9 INJECTION, SOLUTION INTRAVENOUS at 13:12

## 2023-10-13 RX ADMIN — PANTOPRAZOLE SODIUM 40 MG: 40 TABLET, DELAYED RELEASE ORAL at 18:06

## 2023-10-13 RX ADMIN — GABAPENTIN 1200 MG: 600 TABLET, FILM COATED ORAL at 17:32

## 2023-10-13 RX ADMIN — CLONIDINE HYDROCHLORIDE 0.1 MG: 0.1 TABLET ORAL at 18:06

## 2023-10-13 RX ADMIN — SODIUM CHLORIDE 1000 ML: 9 INJECTION, SOLUTION INTRAVENOUS at 16:07

## 2023-10-13 RX ADMIN — SODIUM CHLORIDE, POTASSIUM CHLORIDE, SODIUM LACTATE AND CALCIUM CHLORIDE: 600; 310; 30; 20 INJECTION, SOLUTION INTRAVENOUS at 17:36

## 2023-10-13 RX ADMIN — FOLIC ACID: 5 INJECTION, SOLUTION INTRAMUSCULAR; INTRAVENOUS; SUBCUTANEOUS at 20:19

## 2023-10-13 RX ADMIN — OLANZAPINE 5 MG: 10 INJECTION, POWDER, FOR SOLUTION INTRAMUSCULAR at 14:42

## 2023-10-13 RX ADMIN — ACETAMINOPHEN 650 MG: 325 TABLET, FILM COATED ORAL at 20:33

## 2023-10-13 ASSESSMENT — ACTIVITIES OF DAILY LIVING (ADL)
ADLS_ACUITY_SCORE: 22
CONCENTRATING,_REMEMBERING_OR_MAKING_DECISIONS_DIFFICULTY: NO
ADLS_ACUITY_SCORE: 35
DOING_ERRANDS_INDEPENDENTLY_DIFFICULTY: NO
CHANGE_IN_FUNCTIONAL_STATUS_SINCE_ONSET_OF_CURRENT_ILLNESS/INJURY: NO
ADLS_ACUITY_SCORE: 35
DRESSING/BATHING_DIFFICULTY: NO
ADLS_ACUITY_SCORE: 35
TOILETING_ISSUES: NO
NUMBER_OF_TIMES_PATIENT_HAS_FALLEN_WITHIN_LAST_SIX_MONTHS: 3
ADLS_ACUITY_SCORE: 22
ADLS_ACUITY_SCORE: 35
DIFFICULTY_COMMUNICATING: NO
FALL_HISTORY_WITHIN_LAST_SIX_MONTHS: YES
DIFFICULTY_EATING/SWALLOWING: NO
WALKING_OR_CLIMBING_STAIRS_DIFFICULTY: NO
WEAR_GLASSES_OR_BLIND: NO
HEARING_DIFFICULTY_OR_DEAF: NO

## 2023-10-13 NOTE — ED TRIAGE NOTES
"      EMS called by patient.    Pt was at rest stop when he called and had been vomiting. Reports he lives out of his car. Has a history of bloody emesis and was trying to prevent this. Last drink was about 4 hours ago. Per breathalyzer ETOH 0.39.  Drinks \"a handle a day.\" HX of seizures from withdrawal. States he may have had a seizure 6 days ago, because he believes he bit his tongue. He was not seen for this at the time.     Feels suicidal all the time. Pt having auditory hallucinations. Hearing \"jose alfredo jam\" playing from the corner of the room. Reports that he is seeing shadows out of the corner of his eye.      Zofran 4 mg and 1 mg ativan given by EMS. Nausea improved.      "

## 2023-10-13 NOTE — H&P
[facility]    History and Physical - Hospitalist Service       Date of Admission:  10/13/2023    Assessment & Plan      Chuck Magdaleno is a 33 year old male with alcohol dependence with hx of alcohol withdrawal seizure, depression and anxiety, who was admitted on 10/13/2023 with alcohol intoxication and early signs of alcohol withdrawal. He self reports hx of alcohol withdrawal seizures 7 previous times.    1. Alcohol intoxication  Severe alcohol dependence  High risk for severe alcohol withdrawal  Anion gap metabolic acidosis  Starvation and alcoholic ketoacidosis and lactic acidosis  Recent relapse in alcohol use, previously sober for 140 days. Drinks 1.75 L of alcohol daily. States he is in early stages of withdrawal, just waiting for the seizure to hit. He notes nausea without vomiting yet, notes auditory and visual hallucinations, not threatening. Notes RUQ abd discomfort, denies chest pain or SOB. States he has not eaten for 7 days.  Pt does want to get and stay sober. On waiting list for [facility] in [place], anticipate bed available in 1.5-2 weeks.   In ED, mildly tachycardic, VS otherwise stable.CMP and CBC are fairly unremarkable, anion gap 23, CO2 21. Ketones 0.40, lactic acid 6.5. received 5 mg IV Zyprexa, 40 mg IV Protonix, 100 mg IV thiamine and 1L NS bolus.  - admit to inpatient  - give additional 1L NS bolus then start LR at 150 ml/hr  - monitor on CIWA  - start gabapentin protocol and padmaja clonidine  - sx triggered management with Ativan  - IV vitamins  - ok for regular diet now as he is alert. Reassess if he becomes somnolent   - chem dep eval / SW consult. Planning for residential treatment in [place] in the coming weeks  - PRN meds for agitation  - recheck lactic acid this evening    2. Depression / anxiety  Unclear if taking any chronic medications  - PRN hydroxyzine available        Diet: Regular Diet Adult    DVT Prophylaxis: Pneumatic Compression  Devices  Eugene Catheter: Not present  Lines: None     Cardiac Monitoring: None  Code Status:  full code    Clinically Significant Risk Factors Present on Admission             # Anion Gap Metabolic Acidosis: Highest Anion Gap = 23 mmol/L in last 2 days, will monitor and treat as appropriate                      Disposition Plan      Expected Discharge Date: 10/15/2023                The patient's care was discussed with the Attending Physician, Dr. Neil, Patient, Patient's Family, and ED provider, Dr. Lopez .    Rosaline Gutierrez PA-C  Hospitalist Service  [facility]  Securely message with [employer] (more info)  Text page via [facility] Paging/Directory     ______________________________________________________________________    Chief Complaint   Alcohol intoxication / withdrawal    History is obtained from the patient    History of Present Illness   joana Magdaleno is a 33 year old male with alcohol dependence with hx of alcohol withdrawal seizure, depression and anxiety, who presents to the ED with alcohol intoxication and concern for alcohol withdrawal.  He endorses drinking 1.75 L of alcohol per day.  Prior to his current relapse, he was sober for 140 days.  He notes feeling nauseous as well as reports auditory and visual hallucinations, which are common occurrences prior to alcohol withdrawal and sometimes alcohol withdrawal seizures.  He states he is just waiting for the alcohol withdrawal seizures to come. He is homeless and has been living out of his car. He denies any fever, chills, chest pain, shortness of breath, vomiting, diarrhea or dysuria.  He states he has not eaten for 7 days and has not had a bowel movement in that timeframe as well.  He notes a history of hematemesis due to gastritis versus Jessica-Osorio tear which makes him nervous.  He does endorse wanting to remain sober and has plans to enroll in a program at [facility] in [place].  Per family,  anticipate an open bed in 1.5-2 weeks. His last drink was ~4 hrs prior to arrival.      Past Medical History    Alcohol dependence  Alcohol abuse  Hx of alcohol withdrawal  Depression / anxiety     Past Surgical History   Past Surgical History:   Procedure Laterality Date    APPENDECTOMY OPEN       Med rec pending  Prior to Admission Medications   Prior to Admission Medications   Prescriptions Last Dose Informant Patient Reported? Taking?   folic acid (FOLVITE) 1 MG tablet   No No   Sig: Take 1 tablet (1 mg) by mouth daily   hydrOXYzine (VISTARIL) 50 MG capsule   Yes No   Sig: Take 50 mg by mouth 3 times daily as needed for anxiety   naltrexone (DEPADE/REVIA) 50 MG tablet   No No   Sig: Take 1 tablet (50 mg) by mouth daily   pantoprazole (PROTONIX) 40 MG EC tablet   No No   Sig: Take 1 tablet (40 mg) by mouth 2 times daily (before meals)   sertraline (ZOLOFT) 100 MG tablet   Yes No   Sig: Take 100 mg by mouth daily   sucralfate (CARAFATE) 1 GM/10ML suspension   No No   Sig: Take 10 mLs (1 g) by mouth 4 times daily (before meals and nightly) for 30 days   thiamine (B-1) 100 MG tablet   No No   Sig: Take 1 tablet (100 mg) by mouth daily   traZODone (DESYREL) 100 MG tablet   Yes No   Sig: Take 100 mg by mouth nightly as needed for sleep      Facility-Administered Medications: None           Physical Exam   Vital Signs: Temp: 98.9  F (37.2  C) Temp src: Temporal BP: (!) 126/90 Pulse: 109   Resp: 14 SpO2: 98 %      Weight: 0 lbs 0 oz    GENERAL:  Comfortable.  PSYCH: pleasant, oriented, No acute distress.  HEENT:  Atraumatic, normocephalic. Conjunctiva injected, normal hearing, and oropharynx is normal.  NECK:  Supple, no neck vein distention  HEART:  Normal S1, S2 with no murmur, no pericardial rub, gallops or S3 or S4.  LUNGS:  Clear to auscultation, normal Respiratory effort. No wheezing, rales or ronchi.  GI:  Soft, normal bowel sounds. Non-tender, non distended.   EXTREMITIES:  No pedal edema, +2 pulses bilateral  and equal.  SKIN:  Dry to touch, No rash, wound or ulcerations.  NEUROLOGIC:  CN 2-12 intact, BL 5/5 symmetric upper and lower extremity strength, sensation is intact with no focal deficits.      Medical Decision Making       75+ MINUTES SPENT BY ME on the date of service doing chart review, history, exam, documentation & further activities per the note.      Data     I have personally reviewed the following data over the past 24 hrs:    5.8  \   15.5   / 419     141 97 (L) 12.0 /  91   3.7 21 (L) 0.80 \     ALT: 37 AST: 33 AP: 74 TBILI: 0.4   ALB: 5.1 TOT PROTEIN: 7.7 LIPASE: N/A     Procal: N/A CRP: N/A Lactic Acid: 6.5 (HH)         Imaging results reviewed over the past 24 hrs:   No results found for this or any previous visit (from the past 24 hour(s)).

## 2023-10-13 NOTE — ED NOTES
[facility]  ED Nurse Handoff Report    ED Chief complaint: Alcohol Intoxication  . ED Diagnosis:   Final diagnoses:   Alcohol withdrawal, uncomplicated (H)   History of seizure due to alcohol withdrawal       Allergies: No Known Allergies    Code Status: Full Code    Activity level - Baseline/Home:  independent.  Activity Level - Current:   standby.   Lift room needed: No.   Bariatric: No   Needed: No   Isolation: No.   Infection: Not Applicable.     Respiratory status: Room air    Vital Signs (within 30 minutes):   Vitals:    10/13/23 1300 10/13/23 1345 10/13/23 1354 10/13/23 1358   BP: (!) 135/99 (!) 126/90     Pulse: 114 104 98 109   Resp: 11  10 14   Temp:       TempSrc:       SpO2: 97%  96% 98%       Cardiac Rhythm:  ,      Pain level:    Patient confused: PT reports having hallucinations, but holds conversation well.   Patient Falls Risk: nonskid shoes/slippers when out of bed, patient and family education, and toileting schedule implemented.   Elimination Status: Has voided     Patient Report - Initial Complaint: Vomiting.   Focused Assessment: A&O. Presents due to c/o vomiting and concerns for withdrawal from alcohol. Pt reports having auditory and visual hallucinations, reports feeling shaky and tingling all over and headache. Hx of seizures from withdrawal. Currently living in his car. 1 mg ativan given by EMS en route. Zyprexa 5 mg given in ED with some relief of symptoms. Requesting food at this time. VSS. Lactic 6.5, thiamine given.    Abnormal Results:   Labs Ordered and Resulted from Time of ED Arrival to Time of ED Departure   ETHYL ALCOHOL LEVEL - Abnormal       Result Value    Alcohol ethyl 0.38 (*)    COMPREHENSIVE METABOLIC PANEL - Abnormal    Sodium 141      Potassium 3.7      Carbon Dioxide (CO2) 21 (*)     Anion Gap 23 (*)     Urea Nitrogen 12.0      Creatinine 0.80      GFR Estimate >90      Calcium 9.5      Chloride 97 (*)     Glucose 91      Alkaline  Phosphatase 74      AST 33      ALT 37      Protein Total 7.7      Albumin 5.1      Bilirubin Total 0.4     KETONE BETA-HYDROXYBUTYRATE QUANTITATIVE, RAPID - Abnormal    Ketone (Beta-Hydroxybutyrate) Quantitative 0.40 (*)    LACTIC ACID WHOLE BLOOD - Abnormal    Lactic Acid 6.5 (*)    MAGNESIUM - Normal    Magnesium 1.8     CBC WITH PLATELETS AND DIFFERENTIAL    WBC Count 5.8      RBC Count 5.23      Hemoglobin 15.5      Hematocrit 44.8      MCV 86      MCH 29.6      MCHC 34.6      RDW 12.8      Platelet Count 419      % Neutrophils 64      % Lymphocytes 29      % Monocytes 6      Mids % (Monos, Eos, Basos)        % Eosinophils 0      % Basophils 1      % Immature Granulocytes 0      NRBCs per 100 WBC 0      Absolute Neutrophils 3.8      Absolute Lymphocytes 1.7      Absolute Monocytes 0.3      Mids Abs (Monos, Eos, Basos)        Absolute Eosinophils 0.0      Absolute Basophils 0.0      Absolute Immature Granulocytes 0.0      Absolute NRBCs 0.0     TYPE AND SCREEN, ADULT    ABO/RH(D) O NEG      Antibody Screen Negative      SPECIMEN EXPIRATION DATE [id number]     ABO/RH TYPE AND SCREEN        No orders to display       Treatments provided: See MAR  Family Comments: No family at bedside  OBS brochure/video discussed/provided to patient:  No  ED Medications:   Medications   sodium chloride 0.9% BOLUS 1,000 mL (0 mLs Intravenous Stopped 10/13/23 1437)   pantoprazole (PROTONIX) IV push injection 40 mg (40 mg Intravenous $Given 10/13/23 1359)   thiamine (B-1) injection 100 mg (100 mg Intravenous $Given 10/13/23 1437)   OLANZapine (zyPREXA) IV injection 5 mg (5 mg Intravenous $Given 10/13/23 1442)       Drips infusing:  No  For the majority of the shift this patient was Green.   Interventions performed were NA.    Sepsis treatment initiated: No    Cares/treatment/interventions/medications to be completed following ED care: Monitor for withdrawal/seizure activity.    ED Nurse Name: Stella Kelley RN  3:54  PM  RECEIVING UNIT ED HANDOFF REVIEW    Above ED Nurse Handoff Report was reviewed: Yes  Reviewed by: Juan M Atkinson, RN on October 13, 2023 at 4:45 PM

## 2023-10-13 NOTE — PHARMACY-ADMISSION MEDICATION HISTORY
Pharmacist Admission Medication History    Admission medication history is complete. The information provided in this note is only as accurate as the sources available at the time of the update.    Information Source(s): Patient, Hospital records, and CareEverywhere/SureScripts via in-person    Pertinent Information: Patient has not taken any medications for about one week attributed to his alcohol intake.    Changes made to PTA medication list:  Added: None  Deleted: Sucralfate  Changed: None      Allergies reviewed with patient and updates made in EHR: yes    Medication History Completed By: Richard Blake [facility] 10/13/2023 4:32 PM    PTA Med List   Medication Sig Last Dose    folic acid (FOLVITE) 1 MG tablet Take 1 tablet (1 mg) by mouth daily Past Week    hydrOXYzine (VISTARIL) 50 MG capsule Take 50 mg by mouth 3 times daily as needed for anxiety Past Week    pantoprazole (PROTONIX) 40 MG EC tablet Take 1 tablet (40 mg) by mouth 2 times daily (before meals) Past Week    sertraline (ZOLOFT) 100 MG tablet Take 100 mg by mouth daily Past Week    thiamine (B-1) 100 MG tablet Take 1 tablet (100 mg) by mouth daily Past Week    traZODone (DESYREL) 100 MG tablet Take 100 mg by mouth nightly as needed for sleep Past Week

## 2023-10-13 NOTE — ED PROVIDER NOTES
History     Chief Complaint:  Alcohol Intoxication       HPI   Chuck Magdaleno is a 33 year old male past medical history seen for alcohol withdrawal seizures, alcohol abuse presenting for evaluation of concerns he is in alcohol withdrawal.  He reports drinking 175 Buza daily, last drink was 4 to 6 hours prior, he feels nauseous and has been throwing up, he also reports hearing voices intermittently though denies suicidal homicidal ideation.  He is concerned that if he continues to, he will start vomiting of blood which is happened the past denies other drug use.  He reports he had been sober for 6 months however relapsed after starting to golf again.      None - Patient Only    Review of External Notes:   Discharge summary from 9/26/2023, admission for alcohol withdrawal with gastritis versus Jessica-Osorio tear.      Medications:    folic acid (FOLVITE) 1 MG tablet  hydrOXYzine (VISTARIL) 50 MG capsule  naltrexone (DEPADE/REVIA) 50 MG tablet  pantoprazole (PROTONIX) 40 MG EC tablet  sertraline (ZOLOFT) 100 MG tablet  sucralfate (CARAFATE) 1 GM/10ML suspension  thiamine (B-1) 100 MG tablet  traZODone (DESYREL) 100 MG tablet        Past Medical History:    No past medical history on file.    Past Surgical History:    Past Surgical History:   Procedure Laterality Date    APPENDECTOMY OPEN          Physical Exam   Patient Vitals for the past 24 hrs:   BP Temp Temp src Pulse Resp SpO2   10/13/23 1256 (!) 139/102 98.9  F (37.2  C) Temporal 112 26 98 %        Physical Exam  Constitutional: Alert, attentive, mood slightly elevated, slightly tremulous  HENT:    Mouth/Throat: No overt tongue fasciculations  Eyes: EOM are normal, anicteric, conjugate gaze  CV: distal extremities warm, well perfused  Chest: Non-labored breathing on RA  GI:  non tender. No distension. No guarding or rebound.    Neurological: Alert, attentive, moving all extremities equally.   Skin: Skin is warm and dry.      Emergency Department Course      ECG results from 10/13/23   EKG 12 lead     Value    Systolic Blood Pressure     Diastolic Blood Pressure     Ventricular Rate 93    Atrial Rate 93    SC Interval 146    QRS Duration 86        QTc 447    P Axis 59    R AXIS 49    T Axis 57    Interpretation ECG      Sinus rhythm  Normal ECG  When compared with ECG of 26-SEP-2023 09:16,  No significant change was found           Laboratory:  Labs Ordered and Resulted from Time of ED Arrival to Time of ED Departure   ETHYL ALCOHOL LEVEL - Abnormal       Result Value    Alcohol ethyl 0.38 (*)    COMPREHENSIVE METABOLIC PANEL - Abnormal    Sodium 141      Potassium 3.7      Carbon Dioxide (CO2) 21 (*)     Anion Gap 23 (*)     Urea Nitrogen 12.0      Creatinine 0.80      GFR Estimate >90      Calcium 9.5      Chloride 97 (*)     Glucose 91      Alkaline Phosphatase 74      AST 33      ALT 37      Protein Total 7.7      Albumin 5.1      Bilirubin Total 0.4     KETONE BETA-HYDROXYBUTYRATE QUANTITATIVE, RAPID - Abnormal    Ketone (Beta-Hydroxybutyrate) Quantitative 0.40 (*)    CBC WITH PLATELETS AND DIFFERENTIAL    WBC Count 5.8      RBC Count 5.23      Hemoglobin 15.5      Hematocrit 44.8      MCV 86      MCH 29.6      MCHC 34.6      RDW 12.8      Platelet Count 419      % Neutrophils 64      % Lymphocytes 29      % Monocytes 6      Mids % (Monos, Eos, Basos)        % Eosinophils 0      % Basophils 1      % Immature Granulocytes 0      NRBCs per 100 WBC 0      Absolute Neutrophils 3.8      Absolute Lymphocytes 1.7      Absolute Monocytes 0.3      Mids Abs (Monos, Eos, Basos)        Absolute Eosinophils 0.0      Absolute Basophils 0.0      Absolute Immature Granulocytes 0.0      Absolute NRBCs 0.0     MAGNESIUM   LACTIC ACID WHOLE BLOOD   TYPE AND SCREEN, ADULT    ABO/RH(D) O NEG      Antibody Screen Negative      SPECIMEN EXPIRATION DATE [id number]     ABO/RH TYPE AND SCREEN            Emergency Department Course &  Assessments:        Interventions:  Medications   sodium chloride 0.9% BOLUS 1,000 mL (0 mLs Intravenous Stopped 10/13/23 1437)   pantoprazole (PROTONIX) IV push injection 40 mg (40 mg Intravenous $Given 10/13/23 1359)   thiamine (B-1) injection 100 mg (100 mg Intravenous $Given 10/13/23 1437)   OLANZapine (zyPREXA) IV injection 5 mg (5 mg Intravenous $Given 10/13/23 1442)        Independent Interpretation (X-rays, CTs, rhythm strip):  None    Consultations/Discussion of Management or Tests:  Hospitalist service, will plan for medicine admission.       Social Determinants of Health affecting care:   Homelessness/Housing Insecurity    Disposition:  The patient was admitted to the hospital under the care of ANGEL Guerra with Dr. Neil.     Impression & Plan      Medical Decision Makin-year-old male past male history seen for alcohol abuse, reported alcohol withdrawal seizure presenting for evaluation of concerns for alcohol withdrawal.  He reports feeling shaky tremulous anxious and is started to vomit, he has a history of gastritis versus Jessica-Osorio tear causing upper GI bleed, no history of cirrhosis or varices.  On arrival here he is noted to be hypertensive, tachycardic, EMS reported breathalyzer of 0.39 however serum ethanol here is 0.38.  Labs notable for mildly elevated AG, ketones normal, lactate pending, thiamine given.  He does appear to have early manifest symptoms of withdrawal, is also reporting auditory hallucinations, these do appear somewhat volitional he is down.  Responding to internal stimuli, though cannot totally exclude potentially Warnicke's or Korsakoff syndrome.  Question if there is some secondary gain however given his history of alcohol drawl seizures, manifest symptoms at 0.38 ROGER, will plan for medicine admission as he will likely outpatient therapy options at detox.      Diagnosis:    ICD-10-CM    1. Alcohol withdrawal, uncomplicated (H)  F10.930       2. History of  seizure due to alcohol withdrawal  Z87.898     Z86.59          Josep Lopez MD  [employer]  2:45 PM 10/13/23          Josep Lopez MD  10/13/23 1441

## 2023-10-14 LAB
ALBUMIN SERPL BCG-MCNC: 3.6 G/DL (ref 3.5–5.2)
ALP SERPL-CCNC: 64 U/L (ref 40–129)
ALT SERPL W P-5'-P-CCNC: 24 U/L (ref 0–70)
ANION GAP SERPL CALCULATED.3IONS-SCNC: 9 MMOL/L (ref 7–15)
AST SERPL W P-5'-P-CCNC: 22 U/L (ref 0–45)
BILIRUB SERPL-MCNC: 0.9 MG/DL
BUN SERPL-MCNC: 16.6 MG/DL (ref 6–20)
CALCIUM SERPL-MCNC: 9 MG/DL (ref 8.6–10)
CHLORIDE SERPL-SCNC: 104 MMOL/L (ref 98–107)
CREAT SERPL-MCNC: 0.89 MG/DL (ref 0.67–1.17)
DEPRECATED HCO3 PLAS-SCNC: 26 MMOL/L (ref 22–29)
EGFRCR SERPLBLD CKD-EPI 2021: >90 ML/MIN/1.73M2
ERYTHROCYTE [DISTWIDTH] IN BLOOD BY AUTOMATED COUNT: 12.9 % (ref 10–15)
GLUCOSE SERPL-MCNC: 99 MG/DL (ref 70–99)
HCT VFR BLD AUTO: 36.1 % (ref 40–53)
HGB BLD-MCNC: 12.3 G/DL (ref 13.3–17.7)
MCH RBC QN AUTO: 29.7 PG (ref 26.5–33)
MCHC RBC AUTO-ENTMCNC: 34.1 G/DL (ref 31.5–36.5)
MCV RBC AUTO: 87 FL (ref 78–100)
PLATELET # BLD AUTO: 314 10E3/UL (ref 150–450)
POTASSIUM SERPL-SCNC: 4 MMOL/L (ref 3.4–5.3)
PROT SERPL-MCNC: 5.8 G/DL (ref 6.4–8.3)
RBC # BLD AUTO: 4.14 10E6/UL (ref 4.4–5.9)
SODIUM SERPL-SCNC: 139 MMOL/L (ref 135–145)
WBC # BLD AUTO: 3.8 10E3/UL (ref 4–11)

## 2023-10-14 PROCEDURE — 36415 COLL VENOUS BLD VENIPUNCTURE: CPT | Performed by: PHYSICIAN ASSISTANT

## 2023-10-14 PROCEDURE — 250N000011 HC RX IP 250 OP 636: Performed by: PHYSICIAN ASSISTANT

## 2023-10-14 PROCEDURE — 250N000009 HC RX 250: Performed by: PHYSICIAN ASSISTANT

## 2023-10-14 PROCEDURE — 85014 HEMATOCRIT: CPT | Performed by: PHYSICIAN ASSISTANT

## 2023-10-14 PROCEDURE — 120N000001 HC R&B MED SURG/OB

## 2023-10-14 PROCEDURE — 80053 COMPREHEN METABOLIC PANEL: CPT | Performed by: PHYSICIAN ASSISTANT

## 2023-10-14 PROCEDURE — 258N000003 HC RX IP 258 OP 636: Performed by: PHYSICIAN ASSISTANT

## 2023-10-14 PROCEDURE — 250N000013 HC RX MED GY IP 250 OP 250 PS 637: Performed by: INTERNAL MEDICINE

## 2023-10-14 PROCEDURE — 99232 SBSQ HOSP IP/OBS MODERATE 35: CPT | Performed by: HOSPITALIST

## 2023-10-14 PROCEDURE — 250N000013 HC RX MED GY IP 250 OP 250 PS 637: Performed by: PHYSICIAN ASSISTANT

## 2023-10-14 RX ORDER — DIAZEPAM 10 MG
10 TABLET ORAL ONCE
Status: COMPLETED | OUTPATIENT
Start: 2023-10-14 | End: 2023-10-14

## 2023-10-14 RX ORDER — DIAZEPAM 2.5 MG/.5ML
10 GEL RECTAL ONCE
Status: DISCONTINUED | OUTPATIENT
Start: 2023-10-14 | End: 2023-10-14

## 2023-10-14 RX ADMIN — LORAZEPAM 2 MG: 1 TABLET ORAL at 21:20

## 2023-10-14 RX ADMIN — LORAZEPAM 2 MG: 1 TABLET ORAL at 13:51

## 2023-10-14 RX ADMIN — LORAZEPAM 2 MG: 1 TABLET ORAL at 11:44

## 2023-10-14 RX ADMIN — ONDANSETRON 4 MG: 4 TABLET, ORALLY DISINTEGRATING ORAL at 11:44

## 2023-10-14 RX ADMIN — GABAPENTIN 900 MG: 300 CAPSULE ORAL at 08:58

## 2023-10-14 RX ADMIN — PANTOPRAZOLE SODIUM 40 MG: 40 TABLET, DELAYED RELEASE ORAL at 08:58

## 2023-10-14 RX ADMIN — GABAPENTIN 900 MG: 300 CAPSULE ORAL at 17:26

## 2023-10-14 RX ADMIN — DIAZEPAM 10 MG: 10 TABLET ORAL at 20:43

## 2023-10-14 RX ADMIN — OLANZAPINE 10 MG: 5 TABLET, ORALLY DISINTEGRATING ORAL at 19:30

## 2023-10-14 RX ADMIN — LORAZEPAM 2 MG: 1 TABLET ORAL at 18:45

## 2023-10-14 RX ADMIN — ACETAMINOPHEN 650 MG: 325 TABLET, FILM COATED ORAL at 10:14

## 2023-10-14 RX ADMIN — LORAZEPAM 2 MG: 1 TABLET ORAL at 21:58

## 2023-10-14 RX ADMIN — LORAZEPAM 2 MG: 1 TABLET ORAL at 22:51

## 2023-10-14 RX ADMIN — PANTOPRAZOLE SODIUM 40 MG: 40 TABLET, DELAYED RELEASE ORAL at 17:26

## 2023-10-14 RX ADMIN — LORAZEPAM 1 MG: 1 TABLET ORAL at 11:04

## 2023-10-14 RX ADMIN — LORAZEPAM 1 MG: 1 TABLET ORAL at 00:24

## 2023-10-14 RX ADMIN — CLONIDINE HYDROCHLORIDE 0.1 MG: 0.1 TABLET ORAL at 00:19

## 2023-10-14 RX ADMIN — LORAZEPAM 2 MG: 1 TABLET ORAL at 10:14

## 2023-10-14 RX ADMIN — GABAPENTIN 900 MG: 300 CAPSULE ORAL at 00:19

## 2023-10-14 RX ADMIN — LORAZEPAM 1 MG: 1 TABLET ORAL at 12:27

## 2023-10-14 RX ADMIN — LORAZEPAM 2 MG: 1 TABLET ORAL at 09:07

## 2023-10-14 RX ADMIN — Medication 5 MG: at 00:23

## 2023-10-14 ASSESSMENT — ACTIVITIES OF DAILY LIVING (ADL)
ADLS_ACUITY_SCORE: 22
ADLS_ACUITY_SCORE: 22
ADLS_ACUITY_SCORE: 18
ADLS_ACUITY_SCORE: 22
ADLS_ACUITY_SCORE: 18
ADLS_ACUITY_SCORE: 22
ADLS_ACUITY_SCORE: 22
ADLS_ACUITY_SCORE: 18

## 2023-10-14 NOTE — PROGRESS NOTES
Assessments: Pt is A&Ox4. VSS, RA. Afebrile this shift. Up with SBA in room, bed alarm on. Remind pt to call. PRN ativan given x6. See CIWA scores in flow sheets. C/O of auditory and visual hallucinations, improved with ativan. No suicidal ideation. Regular diet. GAURAV SL. Seizure precautions in place. Tylenol given for HA. Zofran given for nausea.     Shift events: signs of withdrawal, ativan given.    Treatment Plan: IVF. CIWA. Chem dep and social work consults. Symptom management. Seizure pads.     Bedside Nurse: Tabatha Valero RN

## 2023-10-14 NOTE — CONSULTS
Care Management Discharge Note    Discharge Date: 10/15/2023       Discharge Disposition: Home, Outpatient Chemical Dependency    Discharge Services: None    Discharge DME: None    Discharge Transportation:      Private pay costs discussed: Not applicable    Does the patient's insurance plan have a 3 day qualifying hospital stay waiver?  Yes     Which insurance plan 3 day waiver is available? Alternative insurance waiver    Will the waiver be used for post-acute placement? No    PAS Confirmation Code:    Patient/family educated on Medicare website which has current facility and service quality ratings: yes    Education Provided on the Discharge Plan:    Persons Notified of Discharge Plans: pt  Patient/Family in Agreement with the Plan: yes    Handoff Referral Completed: No    Additional Information:    SW consulted for rule 25, SW does not complete rule 25 at the hospital. SW met with pt at the bedside to discuss discharge planning. Pt reports that he lives at home and is independent with all cares. Pt has family to transport at discharge. Pt reports that he is working with [facility] to join their IP treatment within the next couple of weeks. Pt would not like SW to reach out to [facility] and reports that this is all arranged once a spot opens on the waitlist. Pt denies SW needs and does not feel that it would be helpful to have a CD consult at the hospital as he has treatment arranged. No SW needs identified. Please re consult or alert SW if needs arise.     NOE Khalil, LGSW  Inpatient Care Coordination  Emergency Room /Darya Munson, LALO

## 2023-10-14 NOTE — PROGRESS NOTES
[facility]    Medicine Progress Note - Hospitalist Service    Date of Admission:  10/13/2023    Assessment & Plan   Chuck Magdaleno is a 33 year old male with alcohol dependence with hx of alcohol withdrawal seizure, depression and anxiety, who was admitted on 10/13/2023 with alcohol intoxication and early signs of alcohol withdrawal. He self reports hx of alcohol withdrawal seizures 7 previous times.     Alcohol intoxication w/dependence  Alcohol withdrawal  -Recent relapse in alcohol use, previously sober for 140 days. Drinks 1.75 L of alcohol daily. States he has not eaten for 7 days. Pt does want to get and stay sober. On waiting list for [facility] in [place], anticipate bed available in 1.5-2 weeks. In ED, mildly tachycardic, VS otherwise stable.CMP and CBC are fairly unremarkable, anion gap 23, CO2 21. Ketones 0.40, lactic acid 6.5. received 5 mg IV Zyprexa, 40 mg IV Protonix, 100 mg IV thiamine and 1L NS bolus.  -on CIWA protocol and withdrawal symptoms improving but persist, monitor another 24 hour  -cont gabapentin protocol but stop clonidine due to borderline BP  -chem dep consulted    Anion gap metabolic acidosis  Starvation and alcoholic ketoacidosis and lactic acidosis  -2/2 alcohol abuse, poor PO intake  -oral intake and lactic acidosis improving, stop fluids today    Anemia  Leukopenia  -likely from alcohol abuse, some dilutional anemia  -monitor for now, no evidence of bleeding     Depression / anxiety  - PRN hydroxyzine available         Diet: Combination Diet Regular Diet Adult    DVT Prophylaxis: Pneumatic Compression Devices  Eugene Catheter: Not present  Lines: None     Cardiac Monitoring: None  Code Status: Full Code        Disposition Plan   Possible discharge tomorrow              Bharathi Neil DO  Hospitalist Service  [facility]  Securely message with [employer] (more info)  Text page via [employer] Paging/Directory    ______________________________________________________________________    Interval History   Reports some persistent nausea, no vomiting but some persistent mild tremors. Oral intake slowly improving. Some abd pain to palpation, seems more chronic in nature     Physical Exam   Vital Signs: Temp: 98.1  F (36.7  C) Temp src: Oral BP: 90/48 Pulse: 59   Resp: 18 SpO2: 96 % O2 Device: None (Room air)    Weight: 175 lbs 0 oz    Constitutional: awake, alert, and cooperative  Eyes: pupils equal, round and reactive to light and conjunctiva normal  ENT: normocepalic, without obvious abnormality, atramatic  Respiratory: no increased work of breathing, good air exchange, and clear to auscultation  Cardiovascular: regular rate and rhythm and no murmur noted  GI: normal bowel sounds, soft, discomfort to palpation rather diffusely but no rebound  Skin: no bruising or bleeding  Neurologic: alert, oriented, no focal deficits, some mild bilateral UE tremors noted    45 MINUTES SPENT BY ME on the date of service doing chart review, history, exam, documentation & further activities per the note.      Data   ------------------------- PAST 24 HR DATA REVIEWED -----------------------------------------------    I have personally reviewed the following data over the past 24 hrs:    3.8 (L)  \   12.3 (L)   / 314     139 104 16.6 /  99   4.0 26 0.89 \     ALT: 24 AST: 22 AP: 64 TBILI: 0.9   ALB: 3.6 TOT PROTEIN: 5.8 (L) LIPASE: N/A     Procal: N/A CRP: N/A Lactic Acid: 3.3 (H)         Imaging results reviewed over the past 24 hrs:   No results found for this or any previous visit (from the past 24 hour(s)).

## 2023-10-14 NOTE — PLAN OF CARE
Assessments:   A/O x4, Up SBA with IV pole, calls appropriately. VSS on RA. PRN ativan given for CIWA score 11. C/o auditory and visual hallucinations. Denies N/V on shift. Seizure pads and bed alarm on. Patient states he has no suicidal ideation. Eating well, snacking throughout night.      Treatment Plan: IVF. CIWA. Chem dep and social work consults. Symptom management. Seizure pads.     Bedside Nurse: Kasia Carrizales RN

## 2023-10-14 NOTE — PLAN OF CARE
Assessments:   A/O x4, Up Ax1. PRN ativan given 2x for CIWA 24 and 11. On room air. Seizure pads and alarms on. Good appetite. Patient states he has no suicidal ideation. MD notified about ED note that patient feels suicidal all the time.     Treatment Plan: IVFs, CIWA protocol, gabapentin and clonidine, chem dep eval / SW consult,     Bedside Nurse: Juan M Atkinson RN

## 2023-10-14 NOTE — PROGRESS NOTES
Cross cover  Report of elevated LA. Secondary to alcohol but descending trajectory. Continue current.   4.7 (6.5)

## 2023-10-15 VITALS
TEMPERATURE: 98.2 F | RESPIRATION RATE: 16 BRPM | OXYGEN SATURATION: 96 % | HEART RATE: 96 BPM | DIASTOLIC BLOOD PRESSURE: 187 MMHG | BODY MASS INDEX: 24.41 KG/M2 | WEIGHT: 175 LBS | SYSTOLIC BLOOD PRESSURE: 204 MMHG

## 2023-10-15 PROCEDURE — 99239 HOSP IP/OBS DSCHRG MGMT >30: CPT | Performed by: HOSPITALIST

## 2023-10-15 PROCEDURE — 250N000013 HC RX MED GY IP 250 OP 250 PS 637: Performed by: PHYSICIAN ASSISTANT

## 2023-10-15 RX ADMIN — LORAZEPAM 2 MG: 1 TABLET ORAL at 08:19

## 2023-10-15 RX ADMIN — PANTOPRAZOLE SODIUM 40 MG: 40 TABLET, DELAYED RELEASE ORAL at 08:19

## 2023-10-15 RX ADMIN — GABAPENTIN 900 MG: 300 CAPSULE ORAL at 08:18

## 2023-10-15 ASSESSMENT — ACTIVITIES OF DAILY LIVING (ADL)
ADLS_ACUITY_SCORE: 18

## 2023-10-15 NOTE — PROGRESS NOTES
Patient wanted to leave early because of which a code 21 was called  Patient is put on hold  He feels that he was not rational in making this decision and wants to stay in the hospital at this time  I did tell him that should he be wanting to leave he will be put on restraints to which he agrees

## 2023-10-15 NOTE — PLAN OF CARE
Assessments: Pt is A&Ox3.  Disoriented to time. VSS, RA. Afebrile this shift. Up with SBA in room, bed alarm on. Sitter at bedside this shift for behaviors/agitation.  PRN Zyprexa x1,  ativan given x5. See CIWA scores in flow sheets. C/O of auditory and visual hallucinations, improved with ativan. No suicidal ideation. Regular diet. Seizure precautions in place. Pulled out PIVs incoming nurse aware,     Shift events: signs of withdrawal, ativanx5 given, valium administered x1, pulled out Ivs, wanting to leave AMA, place on hold and assigned 1 to 1.    Treatment Plan: IVF. CIWA. Chem dep and social work consults. Symptom management. Seizure pads, insert IV whenever possible

## 2023-10-15 NOTE — PLAN OF CARE
Pt pulled out is PIV and threatened wanted to leave against medical advice, code 21 called and provider informed and saw the patient. Provider ordered Valium, administered, pt place on hold. CIWA protocol in place and one on one initiated.

## 2023-10-15 NOTE — DISCHARGE SUMMARY
[facility]  Hospitalist Discharge Summary      Date of Admission:  10/13/2023  Date of Discharge:  10/15/2023  Discharging Provider: Bhaarthi Neil DO  Discharge Service: Hospitalist Service    Discharge Diagnoses   Alcohol intoxication, dependence and withdrawal  AGMA w/starvation ketosis and lactic acidosis  Anemia, leukocopenia  Depression, anxiety    Follow-ups Needed After Discharge   Recommend he follow up with PCP and cont to work towards getting into rehab    Discharge Disposition   Left AMA  Condition at discharge: Stable    Hospital Course   Chuck Magdaleno is a 33 year old male with alcohol dependence with hx of alcohol withdrawal seizure, depression and anxiety, who was admitted on 10/13/2023 with alcohol intoxication and early signs of alcohol withdrawal. He self reports hx of alcohol withdrawal seizures 7 previous times.    Medical non compliance  AMA discharge  -was noted to have alcohol in room on 10/14 as well as vape after friends visited. These were removed. Became agitated but was given additional ativan and slept rest of night  -today he is oriented x4, alert, able to tell me what brought him into hospital and risks of leaving including further seizures, death. Long discussion with him and family and I watched nursing ambulate him >100 feet in the hallway and he was stable without stumbling or falls. Long discussion that I recommend he stay for additional monitoring and treatment but he is adament that he go today and signed AMA paperwork. He is able to make own medical decisions based on my evaluation and is not appropriate for a medical hold at this time. Family aware and hopeful to get him to treatment program in coming days.     Alcohol intoxication w/dependence  Alcohol withdrawal  -Recent relapse in alcohol use, previously sober for 140 days. Drinks 1.75 L of alcohol daily. States he has not eaten for 7 days. Pt does want to get and stay sober. On waiting list  for [facility] in [place], anticipate bed available in 1.5-2 weeks. In ED, mildly tachycardic, VS otherwise stable.CMP and CBC are fairly unremarkable, anion gap 23, CO2 21. Ketones 0.40, lactic acid 6.5. received 5 mg IV Zyprexa, 40 mg IV Protonix, 100 mg IV thiamine and 1L NS bolus.  -chem dep consulted  -stopped clonidine due to borderline BP, was on gabapentin taper up until dishcarge  -had received significant ativan dosing the day prior to his AMA which should help get him through rest of his withdrawals due to halflife but as noted above was largely out of withdrawals at time of his AMA    Anion gap metabolic acidosis  Starvation and alcoholic ketoacidosis and lactic acidosis  -2/2 alcohol abuse, poor PO intake  -oral intake and lactic acidosis improving, stop fluids today    Anemia  Leukopenia  -likely from alcohol abuse, some dilutional anemia     Depression / anxiety  - PRN hydroxyzine available     Consultations This Hospital Stay   CARE MANAGEMENT / SOCIAL WORK IP CONSULT    Code Status   Full Code    Time Spent on this Encounter   I, Bharathi Neil DO, personally saw the patient today and spent greater than 30 minutes discharging this patient.       Bharathi Neil DO  [place] [address] [facility]  [address] [place] [address]  Phone: [phone]  Fax: [phone]  ______________________________________________________________________    Physical Exam   Vital Signs: Temp: 98.2  F (36.8  C) Temp src: Oral BP: (!) 204/187 Pulse: 96   Resp: 16 SpO2: 96 % O2 Device: None (Room air)    Weight: 175 lbs 0 oz  Face to face completed day of discharge       Primary Care Physician   Physician No Ref-Primary    Discharge Orders   No discharge procedures on file.    Significant Results and Procedures   Most Recent 3 CBC's:  Recent Labs   Lab Test 10/14/23  0706 10/13/23  1333 09/29/23  0624   WBC 3.8* 5.8 4.5   HGB 12.3* 15.5 13.8   MCV 87 86 89    562 275      Most Recent 3 BMP's:  Recent Labs   Lab Test 10/14/23  0706 10/13/23  1333 09/29/23  0624    141 139   POTASSIUM 4.0 3.7 4.4   CHLORIDE 104 97* 104   CO2 26 21* 26   BUN 16.6 12.0 13.7   CR 0.89 0.80 0.86   ANIONGAP 9 23* 9   TEO 9.0 9.5 9.2   GLC 99 91 96     Most Recent 2 LFT's:  Recent Labs   Lab Test 10/14/23  0706 10/13/23  1333   AST 22 33   ALT 24 37   ALKPHOS 64 74   BILITOTAL 0.9 0.4     Most Recent 3 INR's:No lab results found.  Most Recent 3 Creatinines:  Recent Labs   Lab Test 10/14/23  0706 10/13/23  1333 09/29/23  0624   CR 0.89 0.80 0.86     Most Recent 3 Hemoglobins:  Recent Labs   Lab Test 10/14/23  0706 10/13/23  1333 09/29/23  0624   HGB 12.3* 15.5 13.8     Most Recent 3 Troponin's:No lab results found.  Most Recent 3 BNP's:No lab results found.  Most Recent D-dimer:No lab results found.,   Results for orders placed or performed during the hospital encounter of 03/12/23   Head CT w/o contrast    Narrative    EXAM: CT HEAD W/O CONTRAST, CT CERVICAL SPINE W/O CONTRAST, CTA HEAD NECK W CONTRAST  LOCATION: [facility]  DATE/TIME: 3/12/2023 7:27 PM    INDICATION:  Altered mental status, unequal pupils  COMPARISON:  CT head 05/04/2014.  CONTRAST: 75 mL Isovue 370  TECHNIQUE:   1) Head and neck CT angiogram with IV contrast. Noncontrast head CT followed by axial helical CT images of the head and neck vessels obtained during the arterial phase of intravenous contrast administration. Axial 2D reconstructed images and multiplanar   3D MIP reconstructed images of the head and neck vessels were performed by the technologist. Dose reduction techniques were used. All stenosis measurements made according to NASCET criteria unless otherwise specified.  2) Routine CT Cervical Spine without IV contrast. Multiplanar reformats. Dose reduction techniques were used.    FINDINGS:   NONCONTRAST HEAD CT:   INTRACRANIAL CONTENTS: No intracranial hemorrhage, extraaxial  collection, or mass effect.  No CT evidence of acute infarct. Normal parenchymal attenuation. Normal ventricles and sulci.     VISUALIZED ORBITS/SINUSES/MASTOIDS: No intraorbital abnormality. No significant paranasal sinus mucosal disease. No middle ear or mastoid effusion.    BONES/SOFT TISSUES: No acute abnormality.    HEAD CTA:  Examination degraded by bolus timing.  ANTERIOR CIRCULATION: No stenosis/occlusion, aneurysm, or high flow vascular malformation. Standard [place] anatomy.    POSTERIOR CIRCULATION: No stenosis/occlusion, aneurysm, or high flow vascular malformation. Dominant right and smaller left vertebral artery contribute to a normal basilar artery.     DURAL VENOUS SINUSES: Expected enhancement of the major dural venous sinuses.    NECK CTA:  RIGHT CAROTID: No measurable stenosis or dissection.    LEFT CAROTID: No measurable stenosis or dissection.    VERTEBRAL ARTERIES: No focal stenosis or dissection. Dominant right and smaller left vertebral arteries.    AORTIC ARCH: Classic aortic arch anatomy with no significant stenosis at the origin of the great vessels.    NONVASCULAR STRUCTURES: Unremarkable.    CERVICAL SPINE CT:   VERTEBRA: Normal vertebral body heights and alignment. No acute compression fracture or posttraumatic subluxation.     CANAL/FORAMINA: No significant canal or neural foraminal stenosis.    PARASPINAL: No acute extraspinal abnormality.       Impression    IMPRESSION:   HEAD CT:  1.  No acute intracranial process.    HEAD CTA:   1.  No large vessel occlusion or hemodynamically significant stenosis.    NECK CTA:  1.  No large vessel occlusion or hemodynamically significant stenosis.    CERVICAL SPINE CT:  1.  No CT evidence for acute fracture or post traumatic subluxation.   CTA Head Neck with Contrast    Narrative    EXAM: CT HEAD W/O CONTRAST, CT CERVICAL SPINE W/O CONTRAST, CTA HEAD NECK W CONTRAST  LOCATION: [facility]  DATE/TIME: 3/12/2023 7:27 PM    INDICATION:  Altered mental status, unequal pupils  COMPARISON:  CT head 05/04/2014.  CONTRAST: 75 mL Isovue 370  TECHNIQUE:   1) Head and neck CT angiogram with IV contrast. Noncontrast head CT followed by axial helical CT images of the head and neck vessels obtained during the arterial phase of intravenous contrast administration. Axial 2D reconstructed images and multiplanar   3D MIP reconstructed images of the head and neck vessels were performed by the technologist. Dose reduction techniques were used. All stenosis measurements made according to NASCET criteria unless otherwise specified.  2) Routine CT Cervical Spine without IV contrast. Multiplanar reformats. Dose reduction techniques were used.    FINDINGS:   NONCONTRAST HEAD CT:   INTRACRANIAL CONTENTS: No intracranial hemorrhage, extraaxial collection, or mass effect.  No CT evidence of acute infarct. Normal parenchymal attenuation. Normal ventricles and sulci.     VISUALIZED ORBITS/SINUSES/MASTOIDS: No intraorbital abnormality. No significant paranasal sinus mucosal disease. No middle ear or mastoid effusion.    BONES/SOFT TISSUES: No acute abnormality.    HEAD CTA:  Examination degraded by bolus timing.  ANTERIOR CIRCULATION: No stenosis/occlusion, aneurysm, or high flow vascular malformation. Standard [place] of [place] anatomy.    POSTERIOR CIRCULATION: No stenosis/occlusion, aneurysm, or high flow vascular malformation. Dominant right and smaller left vertebral artery contribute to a normal basilar artery.     DURAL VENOUS SINUSES: Expected enhancement of the major dural venous sinuses.    NECK CTA:  RIGHT CAROTID: No measurable stenosis or dissection.    LEFT CAROTID: No measurable stenosis or dissection.    VERTEBRAL ARTERIES: No focal stenosis or dissection. Dominant right and smaller left vertebral arteries.    AORTIC ARCH: Classic aortic arch anatomy with no significant stenosis at the origin of the great  vessels.    NONVASCULAR STRUCTURES: Unremarkable.    CERVICAL SPINE CT:   VERTEBRA: Normal vertebral body heights and alignment. No acute compression fracture or posttraumatic subluxation.     CANAL/FORAMINA: No significant canal or neural foraminal stenosis.    PARASPINAL: No acute extraspinal abnormality.       Impression    IMPRESSION:   HEAD CT:  1.  No acute intracranial process.    HEAD CTA:   1.  No large vessel occlusion or hemodynamically significant stenosis.    NECK CTA:  1.  No large vessel occlusion or hemodynamically significant stenosis.    CERVICAL SPINE CT:  1.  No CT evidence for acute fracture or post traumatic subluxation.   XR Hand Right G/E 3 Views    Narrative    EXAM: XR HAND RIGHT G/E 3 VIEWS  LOCATION: [facility]  DATE/TIME: 3/12/2023 7:02 PM    INDICATION: Right hand pain.  COMPARISON: 05/04/2014.      Impression    IMPRESSION:   1.  No acute fracture or joint malalignment.  2.  Interval healing of the radius styloid fracture.    Cervical spine CT w/o contrast    Narrative    EXAM: CT HEAD W/O CONTRAST, CT CERVICAL SPINE W/O CONTRAST, CTA HEAD NECK W CONTRAST  LOCATION: [facility]  DATE/TIME: 3/12/2023 7:27 PM    INDICATION:  Altered mental status, unequal pupils  COMPARISON:  CT head 05/04/2014.  CONTRAST: 75 mL Isovue 370  TECHNIQUE:   1) Head and neck CT angiogram with IV contrast. Noncontrast head CT followed by axial helical CT images of the head and neck vessels obtained during the arterial phase of intravenous contrast administration. Axial 2D reconstructed images and multiplanar   3D MIP reconstructed images of the head and neck vessels were performed by the technologist. Dose reduction techniques were used. All stenosis measurements made according to NASCET criteria unless otherwise specified.  2) Routine CT Cervical Spine without IV contrast. Multiplanar reformats. Dose reduction  techniques were used.    FINDINGS:   NONCONTRAST HEAD CT:   INTRACRANIAL CONTENTS: No intracranial hemorrhage, extraaxial collection, or mass effect.  No CT evidence of acute infarct. Normal parenchymal attenuation. Normal ventricles and sulci.     VISUALIZED ORBITS/SINUSES/MASTOIDS: No intraorbital abnormality. No significant paranasal sinus mucosal disease. No middle ear or mastoid effusion.    BONES/SOFT TISSUES: No acute abnormality.    HEAD CTA:  Examination degraded by bolus timing.  ANTERIOR CIRCULATION: No stenosis/occlusion, aneurysm, or high flow vascular malformation. Standard [place] of [place] anatomy.    POSTERIOR CIRCULATION: No stenosis/occlusion, aneurysm, or high flow vascular malformation. Dominant right and smaller left vertebral artery contribute to a normal basilar artery.     DURAL VENOUS SINUSES: Expected enhancement of the major dural venous sinuses.    NECK CTA:  RIGHT CAROTID: No measurable stenosis or dissection.    LEFT CAROTID: No measurable stenosis or dissection.    VERTEBRAL ARTERIES: No focal stenosis or dissection. Dominant right and smaller left vertebral arteries.    AORTIC ARCH: Classic aortic arch anatomy with no significant stenosis at the origin of the great vessels.    NONVASCULAR STRUCTURES: Unremarkable.    CERVICAL SPINE CT:   VERTEBRA: Normal vertebral body heights and alignment. No acute compression fracture or posttraumatic subluxation.     CANAL/FORAMINA: No significant canal or neural foraminal stenosis.    PARASPINAL: No acute extraspinal abnormality.       Impression    IMPRESSION:   HEAD CT:  1.  No acute intracranial process.    HEAD CTA:   1.  No large vessel occlusion or hemodynamically significant stenosis.    NECK CTA:  1.  No large vessel occlusion or hemodynamically significant stenosis.    CERVICAL SPINE CT:  1.  No CT evidence for acute fracture or post traumatic subluxation.       Discharge Medications   Discharge Medication List as of 10/15/2023 11:12 AM         CONTINUE these medications which have NOT CHANGED    Details   folic acid (FOLVITE) 1 MG tablet Take 1 tablet (1 mg) by mouth daily, Disp-30 tablet, R-0, E-Prescribe      hydrOXYzine (VISTARIL) 50 MG capsule Take 50 mg by mouth 3 times daily as needed for anxiety, Historical      pantoprazole (PROTONIX) 40 MG EC tablet Take 1 tablet (40 mg) by mouth 2 times daily (before meals), Disp-60 tablet, R-0, E-Prescribe      sertraline (ZOLOFT) 100 MG tablet Take 100 mg by mouth daily, Historical      thiamine (B-1) 100 MG tablet Take 1 tablet (100 mg) by mouth daily, Disp-30 tablet, R-0, E-Prescribe      traZODone (DESYREL) 100 MG tablet Take 100 mg by mouth nightly as needed for sleep, Historical      naltrexone (DEPADE/REVIA) 50 MG tablet Take 1 tablet (50 mg) by mouth daily, Disp-30 tablet, R-0, E-Prescribe           Allergies   No Known Allergies

## 2023-10-15 NOTE — PROGRESS NOTES
Given to see the patient on an urgent basis as he wanted to leave AMA patient states that he had his friend bring a bottle of alcohol  That he is struggling between half of his brain saying that is a good person and other have saying that his records when told that we are here to help him and he should not leave the hospital patient has agreed to stay back  We will give him diazepam 10 mg once as part of his alcohol withdrawal  Treatment.  1 is to 1 sitter will be placed  If the patient tries to leave or wants to leave he will be put on hold as he is not capable of making a decision and looks confused

## 2023-10-15 NOTE — PLAN OF CARE
To Do:  End of Shift Summary  For vital signs and complete assessments, please see documentation flowsheets.     Assessments: Assumed cares at 2300. VSS, RA. Bed alarm on. Sitter at bedside this shift for behaviors/agitation. No suicidal ideation. No IV access, pt removed earlier. Seizure precautions in place. Pt placed on hold due to attempting to leave AMA. Slept well without issue on this shift.     Shift events: None.     Treatment Plan: CIWA. Symptom management. Seizure pads. Bedside sitter.     Bedside Nurse: Kasia Carrizales RN

## 2023-10-16 LAB
ATRIAL RATE - MUSE: 93 BPM
DIASTOLIC BLOOD PRESSURE - MUSE: NORMAL MMHG
INTERPRETATION ECG - MUSE: NORMAL
P AXIS - MUSE: 59 DEGREES
PR INTERVAL - MUSE: 146 MS
QRS DURATION - MUSE: 86 MS
QT - MUSE: 360 MS
QTC - MUSE: 447 MS
R AXIS - MUSE: 49 DEGREES
SYSTOLIC BLOOD PRESSURE - MUSE: NORMAL MMHG
T AXIS - MUSE: 57 DEGREES
VENTRICULAR RATE- MUSE: 93 BPM

## 2023-10-17 ENCOUNTER — HOSPITAL ENCOUNTER (EMERGENCY)
Facility: CLINIC | Age: 33
Discharge: HOME | End: 2023-10-17
Attending: EMERGENCY MEDICINE | Admitting: EMERGENCY MEDICINE
Payer: COMMERCIAL

## 2023-10-17 VITALS
OXYGEN SATURATION: 96 % | HEART RATE: 124 BPM | WEIGHT: 175 LBS | BODY MASS INDEX: 24.41 KG/M2 | TEMPERATURE: 97.4 F | RESPIRATION RATE: 16 BRPM | SYSTOLIC BLOOD PRESSURE: 127 MMHG | DIASTOLIC BLOOD PRESSURE: 71 MMHG

## 2023-10-17 DIAGNOSIS — F10.929 ALCOHOLIC INTOXICATION WITH COMPLICATION (H): ICD-10-CM

## 2023-10-17 LAB
ALBUMIN SERPL BCG-MCNC: 5.1 G/DL (ref 3.5–5.2)
ALP SERPL-CCNC: 68 U/L (ref 40–129)
ALT SERPL W P-5'-P-CCNC: 22 U/L (ref 0–70)
ANION GAP SERPL CALCULATED.3IONS-SCNC: 13 MMOL/L (ref 7–15)
APAP SERPL-MCNC: <5 UG/ML (ref 10–30)
AST SERPL W P-5'-P-CCNC: 22 U/L (ref 0–45)
ATRIAL RATE - MUSE: 108 BPM
BASO+EOS+MONOS # BLD AUTO: NORMAL 10*3/UL
BASO+EOS+MONOS NFR BLD AUTO: NORMAL %
BASOPHILS # BLD AUTO: 0 10E3/UL (ref 0–0.2)
BASOPHILS NFR BLD AUTO: 1 %
BILIRUB SERPL-MCNC: 0.2 MG/DL
BUN SERPL-MCNC: 9.4 MG/DL (ref 6–20)
CALCIUM SERPL-MCNC: 9.8 MG/DL (ref 8.6–10)
CHLORIDE SERPL-SCNC: 104 MMOL/L (ref 98–107)
CREAT SERPL-MCNC: 0.75 MG/DL (ref 0.67–1.17)
DEPRECATED HCO3 PLAS-SCNC: 25 MMOL/L (ref 22–29)
DIASTOLIC BLOOD PRESSURE - MUSE: NORMAL MMHG
EGFRCR SERPLBLD CKD-EPI 2021: >90 ML/MIN/1.73M2
EOSINOPHIL # BLD AUTO: 0 10E3/UL (ref 0–0.7)
EOSINOPHIL NFR BLD AUTO: 0 %
ERYTHROCYTE [DISTWIDTH] IN BLOOD BY AUTOMATED COUNT: 13 % (ref 10–15)
ETHANOL SERPL-MCNC: 0.37 G/DL
ETHANOL SERPL-MCNC: 0.51 G/DL
GLUCOSE SERPL-MCNC: 116 MG/DL (ref 70–99)
HCT VFR BLD AUTO: 44.5 % (ref 40–53)
HGB BLD-MCNC: 15.4 G/DL (ref 13.3–17.7)
IMM GRANULOCYTES # BLD: 0 10E3/UL
IMM GRANULOCYTES NFR BLD: 0 %
INR PPP: 0.91 (ref 0.85–1.15)
INTERPRETATION ECG - MUSE: NORMAL
LYMPHOCYTES # BLD AUTO: 1.5 10E3/UL (ref 0.8–5.3)
LYMPHOCYTES NFR BLD AUTO: 21 %
MCH RBC QN AUTO: 29.9 PG (ref 26.5–33)
MCHC RBC AUTO-ENTMCNC: 34.6 G/DL (ref 31.5–36.5)
MCV RBC AUTO: 86 FL (ref 78–100)
MONOCYTES # BLD AUTO: 0.4 10E3/UL (ref 0–1.3)
MONOCYTES NFR BLD AUTO: 5 %
NEUTROPHILS # BLD AUTO: 5.1 10E3/UL (ref 1.6–8.3)
NEUTROPHILS NFR BLD AUTO: 73 %
NRBC # BLD AUTO: 0 10E3/UL
NRBC BLD AUTO-RTO: 0 /100
P AXIS - MUSE: 51 DEGREES
PLATELET # BLD AUTO: 386 10E3/UL (ref 150–450)
POTASSIUM SERPL-SCNC: 4.1 MMOL/L (ref 3.4–5.3)
PR INTERVAL - MUSE: 144 MS
PROT SERPL-MCNC: 8.2 G/DL (ref 6.4–8.3)
QRS DURATION - MUSE: 88 MS
QT - MUSE: 330 MS
QTC - MUSE: 442 MS
R AXIS - MUSE: 43 DEGREES
RBC # BLD AUTO: 5.15 10E6/UL (ref 4.4–5.9)
SALICYLATES SERPL-MCNC: <0.3 MG/DL
SODIUM SERPL-SCNC: 142 MMOL/L (ref 135–145)
SYSTOLIC BLOOD PRESSURE - MUSE: NORMAL MMHG
T AXIS - MUSE: 58 DEGREES
VENTRICULAR RATE- MUSE: 108 BPM
WBC # BLD AUTO: 7 10E3/UL (ref 4–11)

## 2023-10-17 PROCEDURE — 36415 COLL VENOUS BLD VENIPUNCTURE: CPT | Performed by: EMERGENCY MEDICINE

## 2023-10-17 PROCEDURE — 82077 ASSAY SPEC XCP UR&BREATH IA: CPT | Performed by: EMERGENCY MEDICINE

## 2023-10-17 PROCEDURE — 80053 COMPREHEN METABOLIC PANEL: CPT | Performed by: EMERGENCY MEDICINE

## 2023-10-17 PROCEDURE — 258N000003 HC RX IP 258 OP 636: Performed by: EMERGENCY MEDICINE

## 2023-10-17 PROCEDURE — 80143 DRUG ASSAY ACETAMINOPHEN: CPT | Performed by: EMERGENCY MEDICINE

## 2023-10-17 PROCEDURE — 85025 COMPLETE CBC W/AUTO DIFF WBC: CPT | Performed by: EMERGENCY MEDICINE

## 2023-10-17 PROCEDURE — 85610 PROTHROMBIN TIME: CPT | Performed by: EMERGENCY MEDICINE

## 2023-10-17 PROCEDURE — 250N000013 HC RX MED GY IP 250 OP 250 PS 637: Performed by: EMERGENCY MEDICINE

## 2023-10-17 PROCEDURE — 80179 DRUG ASSAY SALICYLATE: CPT | Performed by: EMERGENCY MEDICINE

## 2023-10-17 PROCEDURE — 250N000011 HC RX IP 250 OP 636: Performed by: EMERGENCY MEDICINE

## 2023-10-17 PROCEDURE — 250N000013 HC RX MED GY IP 250 OP 250 PS 637

## 2023-10-17 PROCEDURE — 96361 HYDRATE IV INFUSION ADD-ON: CPT

## 2023-10-17 PROCEDURE — 250N000011 HC RX IP 250 OP 636

## 2023-10-17 PROCEDURE — 99284 EMERGENCY DEPT VISIT MOD MDM: CPT | Mod: 25

## 2023-10-17 PROCEDURE — 96374 THER/PROPH/DIAG INJ IV PUSH: CPT

## 2023-10-17 PROCEDURE — 93005 ELECTROCARDIOGRAM TRACING: CPT | Performed by: EMERGENCY MEDICINE

## 2023-10-17 RX ORDER — ONDANSETRON 4 MG/1
4 TABLET, ORALLY DISINTEGRATING ORAL ONCE
Status: COMPLETED | OUTPATIENT
Start: 2023-10-17 | End: 2023-10-17

## 2023-10-17 RX ORDER — LORAZEPAM 1 MG/1
1 TABLET ORAL ONCE
Status: COMPLETED | OUTPATIENT
Start: 2023-10-17 | End: 2023-10-17

## 2023-10-17 RX ORDER — NICOTINE 21 MG/24HR
1 PATCH, TRANSDERMAL 24 HOURS TRANSDERMAL DAILY
Status: DISCONTINUED | OUTPATIENT
Start: 2023-10-17 | End: 2023-10-18 | Stop reason: HOSPADM

## 2023-10-17 RX ORDER — LORAZEPAM 2 MG/ML
1 INJECTION INTRAMUSCULAR ONCE
Status: COMPLETED | OUTPATIENT
Start: 2023-10-17 | End: 2023-10-17

## 2023-10-17 RX ORDER — LORAZEPAM 2 MG/ML
0.5 INJECTION INTRAMUSCULAR ONCE
Status: DISCONTINUED | OUTPATIENT
Start: 2023-10-17 | End: 2023-10-17

## 2023-10-17 RX ORDER — OLANZAPINE 10 MG/2ML
10 INJECTION, POWDER, FOR SOLUTION INTRAMUSCULAR DAILY PRN
Status: DISCONTINUED | OUTPATIENT
Start: 2023-10-17 | End: 2023-10-18 | Stop reason: HOSPADM

## 2023-10-17 RX ORDER — LORAZEPAM 1 MG/1
1 TABLET ORAL ONCE
Status: DISCONTINUED | OUTPATIENT
Start: 2023-10-17 | End: 2023-10-17

## 2023-10-17 RX ADMIN — NICOTINE 1 PATCH: 21 PATCH, EXTENDED RELEASE TRANSDERMAL at 14:56

## 2023-10-17 RX ADMIN — LORAZEPAM 1 MG: 2 INJECTION INTRAMUSCULAR; INTRAVENOUS at 22:36

## 2023-10-17 RX ADMIN — LORAZEPAM 1 MG: 1 TABLET ORAL at 20:46

## 2023-10-17 RX ADMIN — ONDANSETRON 4 MG: 4 TABLET, ORALLY DISINTEGRATING ORAL at 20:41

## 2023-10-17 RX ADMIN — LORAZEPAM 1 MG: 1 TABLET ORAL at 18:01

## 2023-10-17 RX ADMIN — SODIUM CHLORIDE 1000 ML: 9 INJECTION, SOLUTION INTRAVENOUS at 13:56

## 2023-10-17 RX ADMIN — SODIUM CHLORIDE 1000 ML: 9 INJECTION, SOLUTION INTRAVENOUS at 15:20

## 2023-10-17 ASSESSMENT — ACTIVITIES OF DAILY LIVING (ADL)
ADLS_ACUITY_SCORE: 35

## 2023-10-17 NOTE — ED PROVIDER NOTES
EMERGENCY DEPARTMENT ENCOUNTER      NAME: Chuck Magdaleno  AGE: 33 year old male  YOB: 1990  MRN: [id number]  EVALUATION DATE & TIME: 10/17/2023  1:35 PM    PCP: No Ref-Primary, Physician    ED PROVIDER: Ketty Osborn DO      Chief Complaint   Patient presents with    Altered Mental Status    Alcohol Intoxication         FINAL IMPRESSION:  1. Alcoholic intoxication with complication (H24)          ED COURSE & MEDICAL DECISION MAKING:    Pertinent Labs & Imaging studies reviewed. (See chart for details)  1:29 PM I met the patient and performed my initial interview and exam.  1:35 PM I met with patient's mother to obtain additional history.    33 year old male presents to the Emergency Department for evaluation of mental status, intoxication.  Patient initially combative on arrival but easily calmed.  Noted left pupil dilatation.  Patient is intoxicated.  Mother quickly arrives and provides further history.  Patient has a history of alcohol abuse.  Had a bedside for detox.  Wanted to go out drinking 1 more time.  Left 12 hours ago and told mother you he was at a rest stop.  Patient does have a blower in his car so would have driven there and started drinking.  No signs of damage to the car.  She did find him today and he was intoxicated.  Went to take him to the detox center but but time time got there he was more obtunded and unable to walk.  Given that they recommended evaluation for medical clearance in the emergency department.  No signs of trauma on exam.  On discussed with patient's mother and reviewing chart he has a documented history of asymmetric pupils.  No signs of trauma on exam.  I do not think that CT imaging of the brain is necessary today.  His blood alcohol is elevated to 0.51.  Labs otherwise reassuring.  No elevation in salicylate or Tylenol levels.  Sounds like the detox center will take him if he is below a 0.41.  Given IV fluids, will let him eat.  Once patient is at  appropriate level and ambulatory feel it reasonable to discharge to mother to take to detox.    At the conclusion of the encounter I discussed the results of all of the tests and the disposition. The questions were answered. The patient or family acknowledged understanding and was agreeable with the care plan.     Medical Decision Making    History:  Supplemental history from: Family Member/Significant Other  External Record(s) reviewed: Documented in chart, if applicable.    Work Up:  Chart documentation includes differential considered and any EKGs or imaging independently interpreted by provider, where specified.  In additional to work up documented, I considered the following work up: Documented in chart, if applicable.    External consultation:  Discussion of management with another provider: Documented in chart, if applicable    Complicating factors:  Care impacted by chronic illness: Other: Anxiety and depression  Care affected by social determinants of health: Access to Medical Care and Alcohol Abuse and/or Recreational Drug Use    Disposition considerations: Admission considered. Patient was signed out to the oncoming physician, disposition pending.      MEDICATIONS GIVEN IN THE EMERGENCY:  Medications   OLANZapine (zyPREXA) injection 10 mg (has no administration in time range)   sodium chloride 0.9% BOLUS 1,000 mL (has no administration in time range)   nicotine Patch in Place (21 each Transdermal Patch in Place 10/17/23 9691)   nicotine (NICODERM CQ) 21 MG/24HR 24 hr patch 1 patch (1 patch Transdermal $Patch/Med Applied 10/17/23 7473)   sodium chloride 0.9% BOLUS 1,000 mL (1,000 mLs Intravenous $New Bag 10/17/23 1356)       NEW PRESCRIPTIONS STARTED AT TODAY'S ER VISIT  New Prescriptions    No medications on file          =================================================================    HPI    Patient information was obtained from: patient's mother    Use of : N/A         Chuck Magdaleno  "is a 33 year old male with a pertinent history of anxiety, alcohol dependence, alcohol withdrawal, marijuana dependence, who presents to this ED via walk-in with mother for evaluation of altered mental status.    HPI limited due to intoxication.    Patient's mother reports patient has a bed available at [facility] and is supposed to be admitted today. Mother thinks that yesterday patient decided to \"do a last hurrah\" and left the house at 2 PM. Mother received a call from Mother today and was told that patient was at a rest stop. When mother picked patient up, patient was intoxicated and she thinks that patient maybe hit his head. Mother then picked patient up and drove him to the Detox facility. Detox states that they can't take him unless he can walk into the facility on his two feet and is medically stable. Due to this, they advised her to go to the ED for evaluation and states that a bed will still be available for him till 11 PM tonight. Mother currently just wants him to be medically cleared and to get to the facility before 11 PM tonight. Mother denies history of self injury or illicit drug use. Per mother, patient's left pupil is chronically larger than the right at baseline. There were no other concerns/complaints at this time.      REVIEW OF SYSTEMS   Review of Systems   Reason unable to perform ROS: Intoxication.        PAST MEDICAL HISTORY:  History reviewed. No pertinent past medical history.    PAST SURGICAL HISTORY:  Past Surgical History:   Procedure Laterality Date    APPENDECTOMY OPEN             CURRENT MEDICATIONS:    folic acid (FOLVITE) 1 MG tablet  hydrOXYzine (VISTARIL) 50 MG capsule  naltrexone (DEPADE/REVIA) 50 MG tablet  pantoprazole (PROTONIX) 40 MG EC tablet  sertraline (ZOLOFT) 100 MG tablet  thiamine (B-1) 100 MG tablet  traZODone (DESYREL) 100 MG tablet         ALLERGIES:  No Known Allergies    FAMILY HISTORY:  History reviewed. No pertinent family history.    SOCIAL HISTORY: "   Social History     Socioeconomic History    Marital status: Single   Tobacco Use    Smoking status: Every Day     Types: Vaping Device    Smokeless tobacco: Never   Substance and Sexual Activity    Alcohol use: Yes     Comment: recently replased    Drug use: Yes     Types: Marijuana     Comment: occasionally    Sexual activity: Yes       VITALS:  /86   Pulse 94   Temp 97.4  F (36.3  C) (Temporal)   Resp 28   Wt 79.4 kg (175 lb)   SpO2 93%   BMI 24.41 kg/m      PHYSICAL EXAM    Physical Exam  Constitutional:       General: He is not in acute distress.     Comments: Intoxicated appearing.   HENT:      Head: Normocephalic and atraumatic.      Mouth/Throat:      Pharynx: Oropharynx is clear.   Eyes:      Comments: Left eye dilated pupil.   Cardiovascular:      Rate and Rhythm: Regular rhythm. Tachycardia present.      Pulses: Normal pulses.      Heart sounds: Normal heart sounds.   Pulmonary:      Effort: Pulmonary effort is normal.      Breath sounds: Normal breath sounds.   Abdominal:      General: Abdomen is flat. Bowel sounds are normal.      Palpations: Abdomen is soft.      Tenderness: There is no abdominal tenderness.   Musculoskeletal:         General: Normal range of motion.   Skin:     General: Skin is warm and dry.      Capillary Refill: Capillary refill takes less than 2 seconds.   Neurological:      General: No focal deficit present.      Mental Status: He is oriented to person, place, and time.      Sensory: Sensation is intact.      Motor: Motor function is intact.             LAB:  All pertinent labs reviewed and interpreted.  Labs Ordered and Resulted from Time of ED Arrival to Time of ED Departure   COMPREHENSIVE METABOLIC PANEL - Abnormal       Result Value    Sodium 142      Potassium 4.1      Carbon Dioxide (CO2) 25      Anion Gap 13      Urea Nitrogen 9.4      Creatinine 0.75      GFR Estimate >90      Calcium 9.8      Chloride 104      Glucose 116 (*)     Alkaline Phosphatase 68       AST 22      ALT 22      Protein Total 8.2      Albumin 5.1      Bilirubin Total 0.2     ETHYL ALCOHOL LEVEL - Abnormal    Alcohol ethyl 0.51 (*)    ACETAMINOPHEN LEVEL - Abnormal    Acetaminophen <5.0 (*)    INR - Normal    INR 0.91     SALICYLATE LEVEL - Normal    Salicylate <0.3     CBC WITH PLATELETS AND DIFFERENTIAL    WBC Count 7.0      RBC Count 5.15      Hemoglobin 15.4      Hematocrit 44.5      MCV 86      MCH 29.9      MCHC 34.6      RDW 13.0      Platelet Count 386      % Neutrophils 73      % Lymphocytes 21      % Monocytes 5      Mids % (Monos, Eos, Basos)        % Eosinophils 0      % Basophils 1      % Immature Granulocytes 0      NRBCs per 100 WBC 0      Absolute Neutrophils 5.1      Absolute Lymphocytes 1.5      Absolute Monocytes 0.4      Mids Abs (Monos, Eos, Basos)        Absolute Eosinophils 0.0      Absolute Basophils 0.0      Absolute Immature Granulocytes 0.0      Absolute NRBCs 0.0         RADIOLOGY:  Reviewed all pertinent imaging. Please see official radiology report.  No orders to display       EKG:    Performed at: 10/17/2023 13:41:53    Impression: Sinus tachycardia, Otherwise normal ECG    Rate: 108  Rhythm: Sinus rhythm  Axis: 43  ME Interval: 144  QRS Interval: 88  QTc Interval: 442  ST Changes: None  Comparison: No significant changes found when compared to ECG of 10/13/2023 14:05    I have independently reviewed and interpreted the EKG(s) documented above.      I, Danielle Knowles, am serving as a scribe to document services personally performed by Dr. Ketty Osborn based on my observation and the provider's statements to me. I, Ketty Osborn, DO attest that Danielle Knowles is acting in a scribe capacity, has observed my performance of the services and has documented them in accordance with my direction.    Ketty Osborn, DO  Emergency Medicine  [facility] EMERGENCY ROOM  [address] [address] [phone]  Dept:  [phone]       Ohl, Ketty SCHOFIELD DO  10/17/23 7482

## 2023-10-17 NOTE — ED NOTES
Writer called and updated pt mother. Writer also called and left a message with [facility] to coordinate patient placement.

## 2023-10-17 NOTE — ED NOTES
"[facility] ED Mental Health Handoff Note:     Assuming care from: Dr Danya Osborn    Brief HPI: 33 year old male signed out to me by the above provider. See initial ED Provider note for full details of the presentation.   In brief, patient Patient's mother reports patient has a bed available at [facility] and is supposed to be admitted today. Mother thinks that yesterday patient decided to \"do a last hurrah\" and left the house at 2 PM. Mother received a call from Mother today and was told that patient was at a rest stop. When mother picked patient up, patient was intoxicated and she thinks that patient maybe hit his head. Mother then picked patient up and drove him to the Detox facility. Detox states that they can't take him unless he can walk into the facility on his two feet and is medically stable. Due to this, they advised her to go to the ED for evaluation and states that a bed will still be available for him till 11 PM tonight. Mother currently just wants him to be medically cleared and to get to the facility before 11 PM tonight. Mother denies history of self injury or illicit drug use. Per mother, patient's left pupil is chronically larger than the right at baseline. There were no other concerns/complaints at this time.     The HPI is limited due to intoxication.      Home meds reviewed and ordered/administered: Yes  Medically stable for inpatient mental health admission: Yes.  Evaluated by mental health: Yes. The recommendation is for inpatient mental health treatment. Bed search in process  Safety concerns: At the time I received sign out, there were no safety concerns.    Hold Status:  Active Orders   N/A           Labs/Imaging:   Results for orders placed or performed during the hospital encounter of 10/17/23 (from the past 24 hour(s))   ECG 12-LEAD WITH MUSE (LHE)     Status: None    Collection Time: 10/17/23  1:41 PM   Result Value Ref Range    Systolic Blood Pressure  mmHg    Diastolic Blood " Pressure  mmHg    Ventricular Rate 108 BPM    Atrial Rate 108 BPM    VT Interval 144 ms    QRS Duration 88 ms     ms    QTc 442 ms    P Axis 51 degrees    R AXIS 43 degrees    T Axis 58 degrees    Interpretation ECG       Sinus tachycardia  Otherwise normal ECG  When compared with ECG of 13-OCT-2023 14:05,  No significant change was found  Confirmed by SEE ED PROVIDER NOTE FOR, ECG INTERPRETATION ([id number]),  VIRGEN JULIO ([id number]) on 10/17/2023 2:24:43 PM     CBC with platelets differential     Status: None    Collection Time: 10/17/23  1:47 PM    Narrative    The following orders were created for panel order CBC with platelets differential.  Procedure                               Abnormality         Status                     ---------                               -----------         ------                     CBC with platelets and d...[[id number]]                      Final result                 Please view results for these tests on the individual orders.   Comprehensive metabolic panel     Status: Abnormal    Collection Time: 10/17/23  1:47 PM   Result Value Ref Range    Sodium 142 135 - 145 mmol/L    Potassium 4.1 3.4 - 5.3 mmol/L    Carbon Dioxide (CO2) 25 22 - 29 mmol/L    Anion Gap 13 7 - 15 mmol/L    Urea Nitrogen 9.4 6.0 - 20.0 mg/dL    Creatinine 0.75 0.67 - 1.17 mg/dL    GFR Estimate >90 >60 mL/min/1.73m2    Calcium 9.8 8.6 - 10.0 mg/dL    Chloride 104 98 - 107 mmol/L    Glucose 116 (H) 70 - 99 mg/dL    Alkaline Phosphatase 68 40 - 129 U/L    AST 22 0 - 45 U/L    ALT 22 0 - 70 U/L    Protein Total 8.2 6.4 - 8.3 g/dL    Albumin 5.1 3.5 - 5.2 g/dL    Bilirubin Total 0.2 <=1.2 mg/dL   INR     Status: Normal    Collection Time: 10/17/23  1:47 PM   Result Value Ref Range    INR 0.91 0.85 - 1.15   Ethyl Alcohol Level     Status: Abnormal    Collection Time: 10/17/23  1:47 PM   Result Value Ref Range    Alcohol ethyl 0.51 (HH) <=0.01 g/dL   Salicylate level     Status: Normal    Collection Time:  10/17/23  1:47 PM   Result Value Ref Range    Salicylate <0.3   mg/dL   Acetaminophen level     Status: Abnormal    Collection Time: 10/17/23  1:47 PM   Result Value Ref Range    Acetaminophen <5.0 (L) 10.0 - 30.0 ug/mL   CBC with platelets and differential     Status: None    Collection Time: 10/17/23  1:47 PM   Result Value Ref Range    WBC Count 7.0 4.0 - 11.0 10e3/uL    RBC Count 5.15 4.40 - 5.90 10e6/uL    Hemoglobin 15.4 13.3 - 17.7 g/dL    Hematocrit 44.5 40.0 - 53.0 %    MCV 86 78 - 100 fL    MCH 29.9 26.5 - 33.0 pg    MCHC 34.6 31.5 - 36.5 g/dL    RDW 13.0 10.0 - 15.0 %    Platelet Count 386 150 - 450 10e3/uL    % Neutrophils 73 %    % Lymphocytes 21 %    % Monocytes 5 %    Mids % (Monos, Eos, Basos)      % Eosinophils 0 %    % Basophils 1 %    % Immature Granulocytes 0 %    NRBCs per 100 WBC 0 <1 /100    Absolute Neutrophils 5.1 1.6 - 8.3 10e3/uL    Absolute Lymphocytes 1.5 0.8 - 5.3 10e3/uL    Absolute Monocytes 0.4 0.0 - 1.3 10e3/uL    Mids Abs (Monos, Eos, Basos)      Absolute Eosinophils 0.0 0.0 - 0.7 10e3/uL    Absolute Basophils 0.0 0.0 - 0.2 10e3/uL    Absolute Immature Granulocytes 0.0 <=0.4 10e3/uL    Absolute NRBCs 0.0 10e3/uL   Ethyl Alcohol Level     Status: Abnormal    Collection Time: 10/17/23  5:36 PM   Result Value Ref Range    Alcohol ethyl 0.37 (HH) <=0.01 g/dL         ED Meds:  Medications   OLANZapine (zyPREXA) injection 10 mg (has no administration in time range)   nicotine Patch in Place (21 each Transdermal Patch in Place 10/17/23 4804)   nicotine (NICODERM CQ) 21 MG/24HR 24 hr patch 1 patch (1 patch Transdermal $Patch/Med Applied 10/17/23 8179)   sodium chloride 0.9% BOLUS 1,000 mL (0 mLs Intravenous Stopped 10/17/23 1390)   sodium chloride 0.9% BOLUS 1,000 mL (0 mLs Intravenous Stopped 10/17/23 1731)   LORazepam (ATIVAN) tablet 1 mg (1 mg Oral $Given 10/17/23 1801)     No orders to display       ED Course:     Patient with concern for withdrawal symptoms while in emergency  department.  He was given 1 mg of Ativan orally x2 and 1 IV Ativan 1 mg.  Patient ultimately was discharged to detox after alcohol level was below 0.4.  He was coherent and ambulatory at the time of discharge to detox.      5:56 PM Checked on patient and discussed further plan of care.     Impression:    ICD-10-CM    1. Alcoholic intoxication with complication (H24)  F10.929           Plan:    Transferred to detoxification center.    I, Merly Barrett, am serving as a scribe to document services personally performed by April Oneal MD, based on my observations and the provider's statements to me.  I, April Oneal MD, attest that Merly Barrett is acting in a scribe capacity, has observed my performance of the services and has documented them in accordance with my direction.      April Oneal MD  [facility] EMERGENCY ROOM  [facility] [facility] [address]  [phone]                     April Oneal MD  10/17/23 0189

## 2023-10-17 NOTE — ED TRIAGE NOTES
Pt presents to the ED via private car for AMS/ETOH abuse. Brought here by mother. Mother endorses ETOH abuse, has been attempting to bring pt to detox. Mother tried to bring him, pt unable to stand out of car, detox refused. Detox stated to go to ER to stabilize pt, and a bed will be available. Pt semi combative at times during triage.      Triage Assessment (Adult)       Row Name 10/17/23 1341          Triage Assessment    Airway WDL WDL        Respiratory WDL    Respiratory WDL WDL        Skin Circulation/Temperature WDL    Skin Circulation/Temperature WDL WDL        Cardiac WDL    Cardiac WDL X;rhythm     Cardiac Rhythm ST        Peripheral/Neurovascular WDL    Peripheral Neurovascular WDL WDL        Cognitive/Neuro/Behavioral WDL    Cognitive/Neuro/Behavioral WDL X;mood/behavior;orientation     Level of Consciousness confused;other (see comments)  pt alterted ETOH     Arousal Level opens eyes spontaneously     Orientation person     Speech garbled;slurred     Mood/Behavior agitated;combative        Pupils (CN II)    Pupil PERRLA no     Pupil Size Left 6 mm     Pupil Shape Left round     Pupil Reaction Left equal     Pupil Accommodation Left normal response     Pupil Size Right 4 mm     Pupil Shape Right round     Pupil Reaction Right brisk     Pupil Accommodation Right normal response        [place] Coma Scale    Best Eye Response 4-->(E4) spontaneous     Best Motor Response 6-->(M6) obeys commands     Best Verbal Response 4-->(V4) confused     [place] Coma Scale Score 14

## 2023-10-18 NOTE — ED NOTES
Pt remains alert and oriented at discharge; he was calm and appropriate with staff, he was agreeable with plan to discharge with [place] rep to detox facility.

## 2023-11-03 ENCOUNTER — HOSPITAL ENCOUNTER (INPATIENT)
Facility: CLINIC | Age: 33
LOS: 2 days | Discharge: HOME | DRG: 897 | End: 2023-11-06
Attending: EMERGENCY MEDICINE | Admitting: INTERNAL MEDICINE
Payer: COMMERCIAL

## 2023-11-03 DIAGNOSIS — E88.89 ALCOHOLIC KETOSIS (H): ICD-10-CM

## 2023-11-03 DIAGNOSIS — F10.230 ALCOHOL DEPENDENCE WITH WITHDRAWAL, UNCOMPLICATED (H): Primary | ICD-10-CM

## 2023-11-03 DIAGNOSIS — R56.9 ALCOHOL WITHDRAWAL SEIZURE WITH COMPLICATION (H): ICD-10-CM

## 2023-11-03 DIAGNOSIS — F10.939 ALCOHOL WITHDRAWAL SEIZURE WITH COMPLICATION (H): ICD-10-CM

## 2023-11-03 LAB
ALBUMIN SERPL BCG-MCNC: 4.6 G/DL (ref 3.5–5.2)
ALP SERPL-CCNC: 80 U/L (ref 40–129)
ALT SERPL W P-5'-P-CCNC: 16 U/L (ref 0–70)
ANION GAP SERPL CALCULATED.3IONS-SCNC: 29 MMOL/L (ref 7–15)
AST SERPL W P-5'-P-CCNC: 24 U/L (ref 0–45)
B-OH-BUTYR SERPL-SCNC: 1.4 MMOL/L
BASOPHILS # BLD AUTO: 0.1 10E3/UL (ref 0–0.2)
BASOPHILS NFR BLD AUTO: 1 %
BILIRUB SERPL-MCNC: 0.2 MG/DL
BUN SERPL-MCNC: 14 MG/DL (ref 6–20)
CALCIUM SERPL-MCNC: 9.3 MG/DL (ref 8.6–10)
CHLORIDE SERPL-SCNC: 100 MMOL/L (ref 98–107)
CREAT SERPL-MCNC: 0.83 MG/DL (ref 0.67–1.17)
DEPRECATED HCO3 PLAS-SCNC: 17 MMOL/L (ref 22–29)
EGFRCR SERPLBLD CKD-EPI 2021: >90 ML/MIN/1.73M2
EOSINOPHIL # BLD AUTO: 0 10E3/UL (ref 0–0.7)
EOSINOPHIL NFR BLD AUTO: 0 %
ERYTHROCYTE [DISTWIDTH] IN BLOOD BY AUTOMATED COUNT: 13 % (ref 10–15)
ETHANOL SERPL-MCNC: 0.21 G/DL
GLUCOSE SERPL-MCNC: 79 MG/DL (ref 70–99)
HCO3 BLDV-SCNC: 20 MMOL/L (ref 21–28)
HCT VFR BLD AUTO: 44.4 % (ref 40–53)
HGB BLD-MCNC: 15 G/DL (ref 13.3–17.7)
IMM GRANULOCYTES # BLD: 0.1 10E3/UL
IMM GRANULOCYTES NFR BLD: 0 %
LACTATE BLD-SCNC: 10.7 MMOL/L
LIPASE SERPL-CCNC: 21 U/L (ref 13–60)
LYMPHOCYTES # BLD AUTO: 2.5 10E3/UL (ref 0.8–5.3)
LYMPHOCYTES NFR BLD AUTO: 23 %
MCH RBC QN AUTO: 29.6 PG (ref 26.5–33)
MCHC RBC AUTO-ENTMCNC: 33.8 G/DL (ref 31.5–36.5)
MCV RBC AUTO: 88 FL (ref 78–100)
MONOCYTES # BLD AUTO: 0.6 10E3/UL (ref 0–1.3)
MONOCYTES NFR BLD AUTO: 5 %
NEUTROPHILS # BLD AUTO: 8 10E3/UL (ref 1.6–8.3)
NEUTROPHILS NFR BLD AUTO: 71 %
NRBC # BLD AUTO: 0 10E3/UL
NRBC BLD AUTO-RTO: 0 /100
PCO2 BLDV: 27 MM HG (ref 40–50)
PH BLDV: 7.47 [PH] (ref 7.32–7.43)
PLATELET # BLD AUTO: 534 10E3/UL (ref 150–450)
PO2 BLDV: 23 MM HG (ref 25–47)
POTASSIUM SERPL-SCNC: 3.8 MMOL/L (ref 3.4–5.3)
PROT SERPL-MCNC: 7.7 G/DL (ref 6.4–8.3)
RBC # BLD AUTO: 5.07 10E6/UL (ref 4.4–5.9)
SAO2 % BLDV: 45 % (ref 94–100)
SODIUM SERPL-SCNC: 146 MMOL/L (ref 135–145)
WBC # BLD AUTO: 11.2 10E3/UL (ref 4–11)

## 2023-11-03 PROCEDURE — 80053 COMPREHEN METABOLIC PANEL: CPT | Performed by: EMERGENCY MEDICINE

## 2023-11-03 PROCEDURE — 250N000011 HC RX IP 250 OP 636: Performed by: EMERGENCY MEDICINE

## 2023-11-03 PROCEDURE — 258N000003 HC RX IP 258 OP 636: Performed by: EMERGENCY MEDICINE

## 2023-11-03 PROCEDURE — 82803 BLOOD GASES ANY COMBINATION: CPT

## 2023-11-03 PROCEDURE — 80307 DRUG TEST PRSMV CHEM ANLYZR: CPT | Performed by: EMERGENCY MEDICINE

## 2023-11-03 PROCEDURE — 82010 KETONE BODYS QUAN: CPT | Performed by: EMERGENCY MEDICINE

## 2023-11-03 PROCEDURE — 96361 HYDRATE IV INFUSION ADD-ON: CPT

## 2023-11-03 PROCEDURE — 83690 ASSAY OF LIPASE: CPT | Performed by: EMERGENCY MEDICINE

## 2023-11-03 PROCEDURE — 82077 ASSAY SPEC XCP UR&BREATH IA: CPT | Performed by: EMERGENCY MEDICINE

## 2023-11-03 PROCEDURE — 99285 EMERGENCY DEPT VISIT HI MDM: CPT | Mod: 25

## 2023-11-03 PROCEDURE — 85004 AUTOMATED DIFF WBC COUNT: CPT | Performed by: EMERGENCY MEDICINE

## 2023-11-03 PROCEDURE — 36415 COLL VENOUS BLD VENIPUNCTURE: CPT | Performed by: EMERGENCY MEDICINE

## 2023-11-03 PROCEDURE — 96375 TX/PRO/DX INJ NEW DRUG ADDON: CPT

## 2023-11-03 RX ORDER — DIAZEPAM 10 MG/2ML
5 INJECTION, SOLUTION INTRAMUSCULAR; INTRAVENOUS ONCE
Status: COMPLETED | OUTPATIENT
Start: 2023-11-03 | End: 2023-11-04

## 2023-11-03 RX ADMIN — PHENOBARBITAL SODIUM 230 MG: 65 INJECTION INTRAMUSCULAR; INTRAVENOUS at 23:58

## 2023-11-03 RX ADMIN — SODIUM CHLORIDE, POTASSIUM CHLORIDE, SODIUM LACTATE AND CALCIUM CHLORIDE 1000 ML: 600; 310; 30; 20 INJECTION, SOLUTION INTRAVENOUS at 23:58

## 2023-11-03 ASSESSMENT — ACTIVITIES OF DAILY LIVING (ADL): ADLS_ACUITY_SCORE: 35

## 2023-11-04 PROBLEM — R56.9 ALCOHOL WITHDRAWAL SEIZURE WITH COMPLICATION (H): Status: ACTIVE | Noted: 2023-11-04

## 2023-11-04 PROBLEM — F10.939 ALCOHOL WITHDRAWAL SEIZURE WITH COMPLICATION (H): Status: ACTIVE | Noted: 2023-11-04

## 2023-11-04 PROBLEM — E88.89 ALCOHOLIC KETOSIS (H): Status: ACTIVE | Noted: 2023-11-04

## 2023-11-04 LAB
AMPHETAMINES UR QL SCN: ABNORMAL
BARBITURATES UR QL SCN: ABNORMAL
BENZODIAZ UR QL SCN: ABNORMAL
BZE UR QL SCN: ABNORMAL
CANNABINOIDS UR QL SCN: ABNORMAL
FENTANYL UR QL: ABNORMAL
LACTATE SERPL-SCNC: 0.6 MMOL/L (ref 0.7–2)
LACTATE SERPL-SCNC: 0.6 MMOL/L (ref 0.7–2)
LACTATE SERPL-SCNC: 1.2 MMOL/L (ref 0.7–2)
LACTATE SERPL-SCNC: 6 MMOL/L (ref 0.7–2)
MAGNESIUM SERPL-MCNC: 1.5 MG/DL (ref 1.7–2.3)
MAGNESIUM SERPL-MCNC: 1.6 MG/DL (ref 1.7–2.3)
OPIATES UR QL SCN: ABNORMAL
PCP QUAL URINE (ROCHE): ABNORMAL

## 2023-11-04 PROCEDURE — 83605 ASSAY OF LACTIC ACID: CPT | Performed by: INTERNAL MEDICINE

## 2023-11-04 PROCEDURE — C9113 INJ PANTOPRAZOLE SODIUM, VIA: HCPCS | Mod: JZ | Performed by: INTERNAL MEDICINE

## 2023-11-04 PROCEDURE — 36415 COLL VENOUS BLD VENIPUNCTURE: CPT

## 2023-11-04 PROCEDURE — 96365 THER/PROPH/DIAG IV INF INIT: CPT

## 2023-11-04 PROCEDURE — 99223 1ST HOSP IP/OBS HIGH 75: CPT | Performed by: INTERNAL MEDICINE

## 2023-11-04 PROCEDURE — 258N000003 HC RX IP 258 OP 636: Performed by: EMERGENCY MEDICINE

## 2023-11-04 PROCEDURE — 36415 COLL VENOUS BLD VENIPUNCTURE: CPT | Performed by: INTERNAL MEDICINE

## 2023-11-04 PROCEDURE — 96366 THER/PROPH/DIAG IV INF ADDON: CPT

## 2023-11-04 PROCEDURE — 250N000011 HC RX IP 250 OP 636: Performed by: INTERNAL MEDICINE

## 2023-11-04 PROCEDURE — 250N000013 HC RX MED GY IP 250 OP 250 PS 637: Performed by: INTERNAL MEDICINE

## 2023-11-04 PROCEDURE — 83735 ASSAY OF MAGNESIUM: CPT | Performed by: INTERNAL MEDICINE

## 2023-11-04 PROCEDURE — 120N000001 HC R&B MED SURG/OB

## 2023-11-04 PROCEDURE — 96375 TX/PRO/DX INJ NEW DRUG ADDON: CPT

## 2023-11-04 PROCEDURE — 99207 PR APP CREDIT; MD BILLING SHARED VISIT: CPT | Performed by: INTERNAL MEDICINE

## 2023-11-04 PROCEDURE — 83605 ASSAY OF LACTIC ACID: CPT

## 2023-11-04 PROCEDURE — 258N000003 HC RX IP 258 OP 636: Performed by: INTERNAL MEDICINE

## 2023-11-04 PROCEDURE — 250N000011 HC RX IP 250 OP 636: Performed by: EMERGENCY MEDICINE

## 2023-11-04 PROCEDURE — 250N000009 HC RX 250: Performed by: EMERGENCY MEDICINE

## 2023-11-04 PROCEDURE — HZ2ZZZZ DETOXIFICATION SERVICES FOR SUBSTANCE ABUSE TREATMENT: ICD-10-PCS | Performed by: INTERNAL MEDICINE

## 2023-11-04 RX ORDER — GABAPENTIN 100 MG/1
100 CAPSULE ORAL EVERY 8 HOURS
Qty: 9 CAPSULE | Refills: 0 | Status: DISCONTINUED | OUTPATIENT
Start: 2023-11-11 | End: 2023-11-06 | Stop reason: HOSPADM

## 2023-11-04 RX ORDER — PROCHLORPERAZINE MALEATE 5 MG
10 TABLET ORAL EVERY 6 HOURS PRN
Status: DISCONTINUED | OUTPATIENT
Start: 2023-11-04 | End: 2023-11-06 | Stop reason: HOSPADM

## 2023-11-04 RX ORDER — CLONIDINE HYDROCHLORIDE 0.1 MG/1
0.1 TABLET ORAL EVERY 8 HOURS
Status: DISCONTINUED | OUTPATIENT
Start: 2023-11-04 | End: 2023-11-06 | Stop reason: HOSPADM

## 2023-11-04 RX ORDER — LEVETIRACETAM 500 MG/1
500 TABLET ORAL DAILY
Status: ON HOLD | COMMUNITY
Start: 2023-10-25 | End: 2023-11-11

## 2023-11-04 RX ORDER — ONDANSETRON 4 MG/1
4 TABLET, ORALLY DISINTEGRATING ORAL EVERY 6 HOURS PRN
Status: DISCONTINUED | OUTPATIENT
Start: 2023-11-04 | End: 2023-11-06 | Stop reason: HOSPADM

## 2023-11-04 RX ORDER — TRAZODONE HYDROCHLORIDE 100 MG/1
100 TABLET ORAL
Status: DISCONTINUED | OUTPATIENT
Start: 2023-11-04 | End: 2023-11-06 | Stop reason: HOSPADM

## 2023-11-04 RX ORDER — LORAZEPAM 1 MG/1
1-2 TABLET ORAL EVERY 30 MIN PRN
Status: DISCONTINUED | OUTPATIENT
Start: 2023-11-04 | End: 2023-11-06 | Stop reason: HOSPADM

## 2023-11-04 RX ORDER — LEVETIRACETAM 500 MG/1
500 TABLET ORAL DAILY
Status: DISCONTINUED | OUTPATIENT
Start: 2023-11-04 | End: 2023-11-06 | Stop reason: HOSPADM

## 2023-11-04 RX ORDER — GABAPENTIN 300 MG/1
600 CAPSULE ORAL EVERY 8 HOURS
Qty: 12 CAPSULE | Refills: 0 | Status: DISCONTINUED | OUTPATIENT
Start: 2023-11-07 | End: 2023-11-06 | Stop reason: HOSPADM

## 2023-11-04 RX ORDER — OXYCODONE HYDROCHLORIDE 5 MG/1
5 TABLET ORAL EVERY 4 HOURS PRN
Status: DISCONTINUED | OUTPATIENT
Start: 2023-11-04 | End: 2023-11-06 | Stop reason: HOSPADM

## 2023-11-04 RX ORDER — DOXEPIN HYDROCHLORIDE 50 MG/1
1 CAPSULE ORAL DAILY
Status: ON HOLD | COMMUNITY
Start: 2023-10-26 | End: 2023-11-11

## 2023-11-04 RX ORDER — LORAZEPAM 2 MG/ML
1-2 INJECTION INTRAMUSCULAR EVERY 30 MIN PRN
Status: DISCONTINUED | OUTPATIENT
Start: 2023-11-04 | End: 2023-11-06 | Stop reason: HOSPADM

## 2023-11-04 RX ORDER — BUSPIRONE HYDROCHLORIDE 10 MG/1
10 TABLET ORAL 2 TIMES DAILY
Status: ON HOLD | COMMUNITY
Start: 2023-10-25 | End: 2023-11-11

## 2023-11-04 RX ORDER — OLANZAPINE 5 MG/1
5-10 TABLET, ORALLY DISINTEGRATING ORAL EVERY 6 HOURS PRN
Status: DISCONTINUED | OUTPATIENT
Start: 2023-11-04 | End: 2023-11-06 | Stop reason: HOSPADM

## 2023-11-04 RX ORDER — HALOPERIDOL 5 MG/ML
2.5-5 INJECTION INTRAMUSCULAR EVERY 6 HOURS PRN
Status: DISCONTINUED | OUTPATIENT
Start: 2023-11-04 | End: 2023-11-06 | Stop reason: HOSPADM

## 2023-11-04 RX ORDER — POLYETHYLENE GLYCOL 3350 17 G/17G
17 POWDER, FOR SOLUTION ORAL DAILY PRN
Status: DISCONTINUED | OUTPATIENT
Start: 2023-11-04 | End: 2023-11-06 | Stop reason: HOSPADM

## 2023-11-04 RX ORDER — AMOXICILLIN 250 MG
1 CAPSULE ORAL 2 TIMES DAILY PRN
Status: DISCONTINUED | OUTPATIENT
Start: 2023-11-04 | End: 2023-11-06 | Stop reason: HOSPADM

## 2023-11-04 RX ORDER — NICOTINE 21 MG/24HR
1 PATCH, TRANSDERMAL 24 HOURS TRANSDERMAL EVERY 24 HOURS
Status: ON HOLD | COMMUNITY
Start: 2023-10-26 | End: 2023-11-11

## 2023-11-04 RX ORDER — MULTIPLE VITAMINS W/ MINERALS TAB 9MG-400MCG
1 TAB ORAL DAILY
Status: DISCONTINUED | OUTPATIENT
Start: 2023-11-05 | End: 2023-11-06 | Stop reason: HOSPADM

## 2023-11-04 RX ORDER — ACETAMINOPHEN 325 MG/1
650 TABLET ORAL EVERY 6 HOURS PRN
Status: DISCONTINUED | OUTPATIENT
Start: 2023-11-04 | End: 2023-11-06 | Stop reason: HOSPADM

## 2023-11-04 RX ORDER — ONDANSETRON 2 MG/ML
4 INJECTION INTRAMUSCULAR; INTRAVENOUS EVERY 6 HOURS PRN
Status: DISCONTINUED | OUTPATIENT
Start: 2023-11-04 | End: 2023-11-06 | Stop reason: HOSPADM

## 2023-11-04 RX ORDER — FOLIC ACID 1 MG/1
1 TABLET ORAL DAILY
Status: DISCONTINUED | OUTPATIENT
Start: 2023-11-05 | End: 2023-11-06 | Stop reason: HOSPADM

## 2023-11-04 RX ORDER — PROCHLORPERAZINE 25 MG
25 SUPPOSITORY, RECTAL RECTAL EVERY 12 HOURS PRN
Status: DISCONTINUED | OUTPATIENT
Start: 2023-11-04 | End: 2023-11-06 | Stop reason: HOSPADM

## 2023-11-04 RX ORDER — BUSPIRONE HYDROCHLORIDE 10 MG/1
10 TABLET ORAL 2 TIMES DAILY
Status: DISCONTINUED | OUTPATIENT
Start: 2023-11-04 | End: 2023-11-06 | Stop reason: HOSPADM

## 2023-11-04 RX ORDER — PANTOPRAZOLE SODIUM 40 MG/1
40 TABLET, DELAYED RELEASE ORAL
Status: DISCONTINUED | OUTPATIENT
Start: 2023-11-05 | End: 2023-11-06 | Stop reason: HOSPADM

## 2023-11-04 RX ORDER — GABAPENTIN 300 MG/1
300 CAPSULE ORAL EVERY 8 HOURS
Qty: 6 CAPSULE | Refills: 0 | Status: DISCONTINUED | OUTPATIENT
Start: 2023-11-09 | End: 2023-11-06 | Stop reason: HOSPADM

## 2023-11-04 RX ORDER — ONDANSETRON 2 MG/ML
4 INJECTION INTRAMUSCULAR; INTRAVENOUS EVERY 30 MIN PRN
Status: DISCONTINUED | OUTPATIENT
Start: 2023-11-04 | End: 2023-11-04

## 2023-11-04 RX ORDER — FLUMAZENIL 0.1 MG/ML
0.2 INJECTION, SOLUTION INTRAVENOUS
Status: DISCONTINUED | OUTPATIENT
Start: 2023-11-04 | End: 2023-11-06 | Stop reason: HOSPADM

## 2023-11-04 RX ORDER — HYDROXYZINE PAMOATE 25 MG/1
50 CAPSULE ORAL 3 TIMES DAILY PRN
Status: DISCONTINUED | OUTPATIENT
Start: 2023-11-04 | End: 2023-11-06 | Stop reason: HOSPADM

## 2023-11-04 RX ORDER — GABAPENTIN 300 MG/1
900 CAPSULE ORAL EVERY 8 HOURS
Qty: 27 CAPSULE | Refills: 0 | Status: DISCONTINUED | OUTPATIENT
Start: 2023-11-04 | End: 2023-11-06 | Stop reason: HOSPADM

## 2023-11-04 RX ORDER — NICOTINE 21 MG/24HR
1 PATCH, TRANSDERMAL 24 HOURS TRANSDERMAL DAILY
Status: DISCONTINUED | OUTPATIENT
Start: 2023-11-04 | End: 2023-11-06 | Stop reason: HOSPADM

## 2023-11-04 RX ORDER — ACETAMINOPHEN 650 MG/1
650 SUPPOSITORY RECTAL EVERY 6 HOURS PRN
Status: DISCONTINUED | OUTPATIENT
Start: 2023-11-04 | End: 2023-11-06 | Stop reason: HOSPADM

## 2023-11-04 RX ORDER — SERTRALINE HYDROCHLORIDE 100 MG/1
100 TABLET, FILM COATED ORAL DAILY
Status: DISCONTINUED | OUTPATIENT
Start: 2023-11-04 | End: 2023-11-06 | Stop reason: HOSPADM

## 2023-11-04 RX ORDER — DEXTROSE MONOHYDRATE, SODIUM CHLORIDE, AND POTASSIUM CHLORIDE 50; 1.49; 4.5 G/1000ML; G/1000ML; G/1000ML
INJECTION, SOLUTION INTRAVENOUS CONTINUOUS
Status: DISCONTINUED | OUTPATIENT
Start: 2023-11-04 | End: 2023-11-05

## 2023-11-04 RX ORDER — GABAPENTIN 600 MG/1
1200 TABLET ORAL ONCE
Qty: 2 TABLET | Refills: 0 | Status: COMPLETED | OUTPATIENT
Start: 2023-11-04 | End: 2023-11-04

## 2023-11-04 RX ORDER — AMOXICILLIN 250 MG
2 CAPSULE ORAL 2 TIMES DAILY PRN
Status: DISCONTINUED | OUTPATIENT
Start: 2023-11-04 | End: 2023-11-06 | Stop reason: HOSPADM

## 2023-11-04 RX ADMIN — CLONIDINE HYDROCHLORIDE 0.1 MG: 0.1 TABLET ORAL at 04:11

## 2023-11-04 RX ADMIN — GABAPENTIN 1200 MG: 600 TABLET, FILM COATED ORAL at 04:11

## 2023-11-04 RX ADMIN — CLONIDINE HYDROCHLORIDE 0.1 MG: 0.1 TABLET ORAL at 19:49

## 2023-11-04 RX ADMIN — LEVETIRACETAM 500 MG: 500 TABLET, FILM COATED ORAL at 18:56

## 2023-11-04 RX ADMIN — POTASSIUM CHLORIDE, DEXTROSE MONOHYDRATE AND SODIUM CHLORIDE: 150; 5; 450 INJECTION, SOLUTION INTRAVENOUS at 04:10

## 2023-11-04 RX ADMIN — NICOTINE 1 PATCH: 21 PATCH, EXTENDED RELEASE TRANSDERMAL at 18:55

## 2023-11-04 RX ADMIN — POTASSIUM CHLORIDE, DEXTROSE MONOHYDRATE AND SODIUM CHLORIDE: 150; 5; 450 INJECTION, SOLUTION INTRAVENOUS at 15:08

## 2023-11-04 RX ADMIN — CLONIDINE HYDROCHLORIDE 0.1 MG: 0.1 TABLET ORAL at 11:36

## 2023-11-04 RX ADMIN — DIAZEPAM 5 MG: 10 INJECTION, SOLUTION INTRAMUSCULAR; INTRAVENOUS at 00:29

## 2023-11-04 RX ADMIN — BUSPIRONE HYDROCHLORIDE 10 MG: 10 TABLET ORAL at 19:49

## 2023-11-04 RX ADMIN — ACETAMINOPHEN 650 MG: 325 TABLET, FILM COATED ORAL at 11:36

## 2023-11-04 RX ADMIN — HYDROXYZINE PAMOATE 50 MG: 25 CAPSULE ORAL at 18:56

## 2023-11-04 RX ADMIN — LORAZEPAM 1 MG: 1 TABLET ORAL at 09:50

## 2023-11-04 RX ADMIN — SERTRALINE HYDROCHLORIDE 100 MG: 100 TABLET ORAL at 09:50

## 2023-11-04 RX ADMIN — GABAPENTIN 900 MG: 300 CAPSULE ORAL at 11:36

## 2023-11-04 RX ADMIN — ACETAMINOPHEN 650 MG: 325 TABLET, FILM COATED ORAL at 18:56

## 2023-11-04 RX ADMIN — DEXTROSE AND SODIUM CHLORIDE: 5; 450 INJECTION, SOLUTION INTRAVENOUS at 02:33

## 2023-11-04 RX ADMIN — POTASSIUM CHLORIDE, DEXTROSE MONOHYDRATE AND SODIUM CHLORIDE: 150; 5; 450 INJECTION, SOLUTION INTRAVENOUS at 21:35

## 2023-11-04 RX ADMIN — GABAPENTIN 900 MG: 300 CAPSULE ORAL at 19:49

## 2023-11-04 RX ADMIN — POTASSIUM CHLORIDE, DEXTROSE MONOHYDRATE AND SODIUM CHLORIDE: 150; 5; 450 INJECTION, SOLUTION INTRAVENOUS at 11:22

## 2023-11-04 RX ADMIN — PANTOPRAZOLE SODIUM 40 MG: 40 INJECTION, POWDER, FOR SOLUTION INTRAVENOUS at 09:50

## 2023-11-04 RX ADMIN — LORAZEPAM 2 MG: 2 INJECTION INTRAMUSCULAR; INTRAVENOUS at 11:36

## 2023-11-04 RX ADMIN — FOLIC ACID: 5 INJECTION, SOLUTION INTRAMUSCULAR; INTRAVENOUS; SUBCUTANEOUS at 00:00

## 2023-11-04 ASSESSMENT — ACTIVITIES OF DAILY LIVING (ADL)
ADLS_ACUITY_SCORE: 35

## 2023-11-04 NOTE — ED NOTES
Bed: ED07  Expected date:   Expected time:   Means of arrival:   Comments:  NF 33M etoh withdrawal, ativan given

## 2023-11-04 NOTE — ED PROVIDER NOTES
History   Chief Complaint:  Alcohol Intoxication and Nausea & Vomiting     The history is provided by the patient and medical records.      Chuck Magdaleno is a 33 year old male with history of alcohol withdrawal who presents via EMS with alcohol intoxication, nausea, and vomiting. The patient states he was discharged from detox approximately 4 days ago and started drinking right away. He reportedly was drinking a liter of vodka a day for 5 days. He states his last drink was yesterday. He developed tremors, nausea, and vomiting today. He called EMS due to withdrawal symptoms. EMS gave 4 mg of Zofran and 1 mg of Ativan.     Independent Historian:   None - Patient Only    Review of External Notes:   I reviewed the patient's [facility] emergency department visit on 10/27/23 for alcohol use disorder.     Medications:    Vistaril   Depade   Protonix  Zoloft  Desyrel     Past Medical History:    Alcohol dependence with withdrawal  Alcohol withdrawal seizure   Marijuana dependence   Major depressive disorder   Anxiety disorder   ADHD     Past Surgical History:    Appendectomy      Physical Exam   Patient Vitals for the past 24 hrs:   BP Temp Temp src Pulse Resp SpO2 Weight   11/04/23 0020 -- -- -- 85 12 97 % --   11/04/23 0015 111/72 -- -- 90 12 95 % --   11/04/23 0002 -- 97.6  F (36.4  C) Oral -- -- -- --   11/03/23 2356 -- -- -- 99 10 99 % --   11/03/23 2345 134/79 -- -- 90 11 99 % --   11/03/23 2300 (!) 143/108 -- -- -- 25 -- 83.9 kg (185 lb)      Physical Exam  General: Appears somewhat intoxicated  Head: No signs of trauma.   Mouth/Throat: Oropharynx is clear and moist.   Eyes: Conjunctivae are normal. Pupils are equal, round, and reactive to light.   Neck: Normal range of motion.  CV: Normal rate and regular rhythm.    Resp: Effort normal and breath sounds normal. No respiratory distress.   GI: Soft. There is no tenderness.  No rebound or guarding.  Normal bowel sounds.   MSK: Normal range of motion. no edema.  No Calf tenderness. Mildly tremulous  Neuro: The patient is alert and oriented to person, place, and time.  PERRLA, EOMI, strength in upper/lower extremities normal and symmetrical. Sensation normal. Speech normal.  Skin: Skin is warm and dry. No rash noted.   Psych: normal mood and affect. behavior is normal.     Emergency Department Course   Laboratory:  Labs Ordered and Resulted from Time of ED Arrival to Time of ED Departure   COMPREHENSIVE METABOLIC PANEL - Abnormal       Result Value    Sodium 146 (*)     Potassium 3.8      Carbon Dioxide (CO2) 17 (*)     Anion Gap 29 (*)     Urea Nitrogen 14.0      Creatinine 0.83      GFR Estimate >90      Calcium 9.3      Chloride 100      Glucose 79      Alkaline Phosphatase 80      AST 24      ALT 16      Protein Total 7.7      Albumin 4.6      Bilirubin Total 0.2     ETHYL ALCOHOL LEVEL - Abnormal    Alcohol ethyl 0.21 (*)    CBC WITH PLATELETS AND DIFFERENTIAL - Abnormal    WBC Count 11.2 (*)     RBC Count 5.07      Hemoglobin 15.0      Hematocrit 44.4      MCV 88      MCH 29.6      MCHC 33.8      RDW 13.0      Platelet Count 534 (*)     % Neutrophils 71      % Lymphocytes 23      % Monocytes 5      % Eosinophils 0      % Basophils 1      % Immature Granulocytes 0      NRBCs per 100 WBC 0      Absolute Neutrophils 8.0      Absolute Lymphocytes 2.5      Absolute Monocytes 0.6      Absolute Eosinophils 0.0      Absolute Basophils 0.1      Absolute Immature Granulocytes 0.1      Absolute NRBCs 0.0     ISTAT GASES LACTATE VENOUS POCT - Abnormal    Lactic Acid POCT 10.7 (*)     Bicarbonate Venous POCT 20 (*)     O2 Sat, Venous POCT 45 (*)     pCO2 Venous POCT 27 (*)     pH Venous POCT 7.47 (*)     pO2 Venous POCT 23 (*)    KETONE BETA-HYDROXYBUTYRATE QUANTITATIVE, RAPID - Abnormal    Ketone (Beta-Hydroxybutyrate) Quantitative 1.40 (*)    URINE DRUG SCREEN PANEL - Abnormal    Amphetamines Urine Screen Negative      Barbituates Urine Screen Negative      Benzodiazepine Urine  Screen Positive (*)     Cannabinoids Urine Screen Positive (*)     Cocaine Urine Screen Negative      Fentanyl Qual Urine Screen Negative      Opiates Urine Screen Negative      PCP Urine Screen Negative     LIPASE - Normal    Lipase 21        Emergency Department Course & Assessments:     Interventions:  Medications   diazepam (VALIUM) injection 5 mg (5 mg Intravenous $Given 11/4/23 0029)   dextrose 5% and 0.45% NaCl infusion (has no administration in time range)   sodium chloride 0.9 % 1,000 mL with Infuvite Adult 10 mL, thiamine 100 mg, folic acid 1 mg infusion ( Intravenous $New Bag 11/4/23 0000)   PHENobarbital (LUMINAL) 230 mg in sodium chloride 0.9 % 103.54 mL intermittent infusion (230 mg Intravenous $Given 11/3/23 2358)   lactated ringers BOLUS 1,000 mL (1,000 mLs Intravenous $New Bag 11/3/23 2358)      Assessments:  2302 I obtained history and performed an exam of the patient as documented above.    Independent Interpretation (X-rays, CTs, rhythm strip):  None    Consultations/Discussion of Management or Tests:  0028 I spoke with Dr. Pruitt of the hospitalist service who accepts the patient for admission.     Social Determinants of Health affecting care:   Alcohol abuse    Disposition:  The patient was admitted to the hospital under the care of Dr. Pruitt.     Impression & Plan    CMS Diagnoses: The Lactic acid level is elevated due to alcohol ketosis, at this time there is no sign of severe sepsis or septic shock.    Medical Decision Making:  Chuck Magdaleno presents due to concerns for alcohol withdrawal.  He reports he has been drinking heavily for the last number of days and stop drinking yesterday.  He reports having some nausea and vomiting and not feeling well.  On my evaluation, he did appear somewhat intoxicated but he was alert and conversant.  Blood work was obtained that did show significant elevation of lactic acid and anion gap.  He also had an elevated ketones.  In this setting, I am  concerned for alcohol ketosis.  Clinically I do not believe the patient has signs of infection and I do not believe that this represents sepsis.  He was given IV fluids along with D5.  He was given Valium and phenobarb to help with the withdrawal symptoms, and these were effective.  Patient was admitted to the hospitalist service.    Diagnosis:    ICD-10-CM    1. Alcoholic ketosis (H)  E88.89       2. Alcohol withdrawal seizure with complication (H)  F10.939     R56.9          Scribe Disclosure:  I, Ember Patten, am serving as a scribe at 11:01 PM on 11/3/2023 to document services personally performed by Josep Pimentel MD based on my observations and the provider's statements to me.      Josep Pimentel MD  11/04/23 0568

## 2023-11-04 NOTE — H&P
[facility]    History and Physical  Hospitalist       Date of Admission:  11/3/2023    Assessment & Plan     Alcohol dependence with acute alcohol withdrawal  This is a 33-year-old male with history of alcohol abuse, was drinking heavily for the last 2 months daily 1 L of vodka now presented with nausea vomiting tremors dizziness and lightheadedness.  He is withdrawing actively, his last drink was yesterday, still blood alcohol level is about 0.21.  He was given phenobarbital and diazepam in the ED  I will admit him, start him on alcohol withdrawal protocol.  I will keep him on IV fluids at 150 mL/h, IV thiamine, folic acid and multivitamin.  Was started on alcohol withdrawal protocol with clonidine, gabapentin taper, checking CIWA and giving lorazepam according to the CIWA.  Seizures precautions, fall precautions, aspiration precautions.  /case management for alcohol abuse, he will likely need to be seen by CD during his hospital stay.    Lactic and ketoacidosis   Metabolic acidosis  Patient lactic acid elevated to 10.7 ketones but is positive for 1.40.  This is likely secondary to significant alcohol consumption not eating and drinking much.  Agree with 2 L IV fluid bolus, will keep him on IV fluids at 150 mill per hour  Check his lactic acid every 4 hour to make sure it is improving with the fluids    Alcoholic gastritis  Nausea and vomiting  Nausea and vomiting and mild epigastric discomfort secondary to alcoholic gastritis  I will keep him on IV Protonix 40 mg twice daily at this time  Keep him on antiemetic protocol    Mild hypernatremia  Sodium mildly elevated 146, most likely secondary to water deficit and dehydration  We will keep him on half-strength normal saline  Recheck labs in the morning    History of anxiety/depression  ADHD  He is on Zoloft 150 mg daily, trazodone 100 mg daily and Vistaril we will keep him on that.    DVT Prophylaxis: Pneumatic Compression  Devices  Code Status: Full Code    Disposition: Expected discharge in 2 days once stable.    Heron Pruitt MD, MD    Primary Care Physician   Physician No Ref-Primary    Chief Complaint   Nausea vomiting and withdrawal symptoms    History is obtained from the patient    History of Present Illness   This is a 33-year-old male with history of alcohol dependence ADHD, depression come to the ER with alcohol withdrawal symptoms.    According to the patient he was at the rehab for few months and then remained sober at the sober home for 5 to 6-month and started drinking again heavily for the last 2 months.  He has history of alcohol abuse for the last 10 to 12 years.  Recently has been drinking about 1 L of vodka daily for the last 2 months.  Yesterday he quit drinking, he started having nausea vomiting and withdrawal symptoms.  He did have a history of seizures and DTs in the past.  Because of his symptoms of nausea vomiting abdominal discomfort, some numbness in his hands.  He came to the ED for detox.    He denies any fever chills cough chest pain shortness of orthopnea PND palpitation dysuria materia constipation diarrhea at this time.    In the ED was found to be slightly tachycardic, lactic acid came back highly elevated 10.5 and was having metabolic acidosis as well.  He was given 2 L of IV fluid bolus and the hospital service is consulted to admit the patient.    Past Medical History    I have reviewed this patient's medical history and updated it with pertinent information if needed.   History reviewed. No pertinent past medical history.    Past Surgical History   I have reviewed this patient's surgical history and updated it with pertinent information if needed.  Past Surgical History:   Procedure Laterality Date    APPENDECTOMY OPEN         Prior to Admission Medications   Prior to Admission Medications   Prescriptions Last Dose Informant Patient Reported? Taking?   folic acid (FOLVITE) 1 MG tablet   No No    Sig: Take 1 tablet (1 mg) by mouth daily   hydrOXYzine (VISTARIL) 50 MG capsule   Yes No   Sig: Take 50 mg by mouth 3 times daily as needed for anxiety   naltrexone (DEPADE/REVIA) 50 MG tablet   No No   Sig: Take 1 tablet (50 mg) by mouth daily   Patient not taking: Reported on 10/13/2023   pantoprazole (PROTONIX) 40 MG EC tablet   No No   Sig: Take 1 tablet (40 mg) by mouth 2 times daily (before meals)   sertraline (ZOLOFT) 100 MG tablet   Yes No   Sig: Take 100 mg by mouth daily   thiamine (B-1) 100 MG tablet   No No   Sig: Take 1 tablet (100 mg) by mouth daily   traZODone (DESYREL) 100 MG tablet   Yes No   Sig: Take 100 mg by mouth nightly as needed for sleep      Facility-Administered Medications: None     Allergies   No Known Allergies    Social History   I have reviewed this patient's social history and updated it with pertinent information if needed. Chuck BARBOUR Rasta  reports that he has been smoking vaping device. He has never used smokeless tobacco. He reports current alcohol use. He reports current drug use. Drug: Marijuana.    Family History   I have reviewed this patient's family history and updated it with pertinent information if needed.   History reviewed. No pertinent family history.    Review of Systems   CONSTITUTIONAL:  positive for  fatigue and malaise  EYES:  negative  HEENT:  negative  RESPIRATORY:  negative  CARDIOVASCULAR:  negative  GASTROINTESTINAL:  positive for nausea and vomiting  GENITOURINARY:  negative  INTEGUMENT/BREAST:  negative  HEMATOLOGIC/LYMPHATIC:  negative  ALLERGIC/IMMUNOLOGIC:  negative  ENDOCRINE:  negative  MUSCULOSKELETAL:  negative  NEUROLOGICAL:  positive for numbness  BEHAVIOR/PSYCH:  negative    Physical Exam   Temp: 97.6  F (36.4  C) Temp src: Oral BP: 111/72 Pulse: 85   Resp: 12 SpO2: 97 % O2 Device: None (Room air)    Vital Signs with Ranges  Temp:  [97.6  F (36.4  C)] 97.6  F (36.4  C)  Pulse:  [85-99] 85  Resp:  [10-25] 12  BP: (111-143)/()  111/72  SpO2:  [95 %-99 %] 97 %  185 lbs 0 oz    Constitutional: Fatigue, anxious but cooperative, no apparent distress.  Eyes: Conjunctiva and pupils examined and normal.  HEENT: Moist mucous membranes, normal dentition.  Respiratory: Clear to auscultation bilaterally, no crackles or wheezing.  Cardiovascular: Regular rate and rhythm, normal S1 and S2, and no murmur noted.  GI: Soft, non-distended, mild epigastric tenderness, normal bowel sounds.  Lymph/Hematologic: No anterior cervical or supraclavicular adenopathy.  Skin: No rashes, no cyanosis, no edema.  Musculoskeletal: No joint swelling, erythema or tenderness.  Neurologic: No focal deficit  Psychiatric: Anxious    Data   Data reviewed today:  I personally reviewed no images or EKG's today.  Recent Labs   Lab 11/03/23  2310   WBC 11.2*   HGB 15.0   MCV 88   *   *   POTASSIUM 3.8   CHLORIDE 100   CO2 17*   BUN 14.0   CR 0.83   ANIONGAP 29*   TEO 9.3   GLC 79   ALBUMIN 4.6   PROTTOTAL 7.7   BILITOTAL 0.2   ALKPHOS 80   ALT 16   AST 24   LIPASE 21       No results found for this or any previous visit (from the past 24 hour(s)).

## 2023-11-04 NOTE — ED NOTES
[facility]  ED Nurse Handoff Report    ED Chief complaint: Alcohol Intoxication and Nausea & Vomiting      ED Diagnosis:   Final diagnoses:   Alcoholic ketosis (H)   Alcohol withdrawal seizure with complication (H)       Code Status: Full Code    Allergies: No Known Allergies    Patient Story: Pt is a 33 year old male who presents via EMS with alcohol intoxication, nausea, and vomiting. The patient states he was discharged from detox approximately 4 days ago and started drinking right away. He reportedly was drinking a liter of vodka a day for 5 days. He states his last drink was yesterday. He developed tremors, nausea, and vomiting today. He called EMS due to withdrawal symptoms. EMS gave 4 mg of Zofran and 1 mg of Ativan     Focused Assessment:  Pt is awake, alert and orientated X 4. Follows command     Treatments and/or interventions provided: Labs, IVF      Patient's response to treatments and/or interventions: Pt tolerated well     To be done/followed up on inpatient unit:      Does this patient have any cognitive concerns?:  AOX 4    Activity level - Baseline/Home:  Independent  Activity Level - Current:   Independent    Patient's Preferred language: English   Needed?: No    Isolation: None  Infection: Not Applicable  Patient tested for COVID 19 prior to admission: NO  Bariatric?: No    Vital Signs:   Vitals:    11/04/23 0300 11/04/23 0305 11/04/23 0415 11/04/23 0417   BP: 110/74  109/77    Pulse: 92  88    Resp:       Temp:       TempSrc:       SpO2: 94% 95%  95%   Weight:           Cardiac Rhythm:     Was the PSS-3 completed:   Yes  What interventions are required if any?               Family Comments: No family at bedside   OBS brochure/video discussed/provided to patient/family: No              Name of person given brochure if not patient:               Relationship to patient:     For the majority of the shift this patient's behavior was Green.   Behavioral interventions  performed were .    ED NURSE PHONE NUMBER: [phone]

## 2023-11-04 NOTE — ED NOTES
DATE/TIME OF CALL RECEIVED FROM LAB:  11/04/23 at 2:50 AM   LAB TEST:  lactic acid  LAB VALUE:  6.0  PROVIDER NOTIFIED?: Yes  PROVIDER NAME: Dr. Pimentel  DATE/TIME LAB VALUE REPORTED TO PROVIDER: 0251  MECHANISM OF PROVIDER NOTIFICATION: Face-To-Face  PROVIDER RESPONSE: MD vick

## 2023-11-04 NOTE — PROGRESS NOTES
RECEIVING UNIT ED HANDOFF REVIEW    ED Nurse Handoff Report was reviewed by: Darling Sánhcez RN on November 4, 2023 at 2:24 PM

## 2023-11-04 NOTE — PHARMACY-ADMISSION MEDICATION HISTORY
Pharmacy Intern Admission Medication History    Admission medication history is complete. The information provided in this note is only as accurate as the sources available at the time of the update.    Information Source(s): Patient and CareEverywhere/SureScripts via in-person    Pertinent Information: none    Changes made to PTA medication list:  Added: buspirone, levetiracetam, tab-a-tyler, vitamin D3, nicotine patch  Deleted: naltrexone, pantoprazole  Changed: sertraline 100 mg: take 1 tablet once daily -> take 1.5 tablets once daily (per pt and SureScripts recent refill history - #45 for 30 DS on 10/26/23)     Medication Affordability:  Not including over the counter (OTC) medications, was there a time in the past 3 months when you did not take your medications as prescribed because of cost?: No    Allergies reviewed with patient and updates made in EHR: yes    Medication History Completed By: Dalia Owen 11/4/2023 10:16 AM    PTA Med List   Medication Sig Last Dose    busPIRone (BUSPAR) 10 MG tablet Take 10 mg by mouth 2 times daily 11/3/2023    cholecalciferol 50 MCG (2000 UT) CAPS Take 50 mcg by mouth daily 11/3/2023    folic acid (FOLVITE) 1 MG tablet Take 1 tablet (1 mg) by mouth daily 11/3/2023    hydrOXYzine (VISTARIL) 50 MG capsule Take 50 mg by mouth 3 times daily as needed for anxiety  at PRN    levETIRAcetam (KEPPRA) 500 MG tablet Take 500 mg by mouth daily 11/3/2023    Multiple Vitamin (TAB-A-TYLER/BETA CAROTENE) TABS Take 1 tablet by mouth daily 11/3/2023    nicotine (NICODERM CQ) 21 MG/24HR 24 hr patch Place 1 patch onto the skin every 24 hours Past Week    sertraline (ZOLOFT) 100 MG tablet Take 150 mg by mouth daily 11/3/2023    thiamine (B-1) 100 MG tablet Take 1 tablet (100 mg) by mouth daily 11/3/2023    traZODone (DESYREL) 100 MG tablet Take 100 mg by mouth nightly as needed for sleep  at PRN

## 2023-11-04 NOTE — ED TRIAGE NOTES
BIBA. Per EMS pt with tremors, nausea and vomiting.  Pt drank 1 liter of vodka x five days. Today pt pulled over in his car and call for help. -160, , .  18 g placed LAC, NS 500cc ,Zofran 4mg and  Ativan 1mg given. Hx. ETOH , seizures detox.

## 2023-11-04 NOTE — PROGRESS NOTES
[facility]    Hospitalist Progress Note    Interval History   - In withdrawal currently, but abdominal pain improved and tolerated PO intake  - Reports drinking 1L vodka daily since August, was sober for 3 months prior to that    Assessment & Plan   Summary: Chuck Magdaleno is a 33 year old male with PMH alcohol use disorder, alcohol withdrawal with seizures, MDD, marijuana use, anxiety disorder, ADHD, who was admitted on 11/3/2023 with acute alcohol withdrawal.    Alcohol dependence with acute alcohol withdrawal  Drinking heavily for the last 2 months daily 1 L of vodka now presented with nausea vomiting tremors dizziness and lightheadedness. Discharged from detox approximately 4 days ago and started drinking right away. He is withdrawing actively, his last drink was one day PTA. Blood alcohol level  on admission 0.21. He was given phenobarbital and diazepam in the ED  - Continue CIWA, thiamin, folate, benzos PRN  - Clonidine and gabapentin  - Seizures precautions, fall precautions, aspiration precautions.  /case management for alcohol abuse, he will likely need to be seen by CD during his hospital stay.    Lactic and ketoacidosis   Metabolic acidosis  Patient lactic acid elevated to 10.7 ketones but is positive for 1.40.  This is likely secondary to significant alcohol consumption not eating and drinking much. Received 2L IVF bolus. Tolerating PO intake 11/4.  - Decrease IVF to 100ml/hr.    Alcoholic gastritis  Nausea and vomiting  Nausea and vomiting and mild epigastric discomfort secondary to alcoholic gastritis  - Continue PPI + PRn antiemetics    Mild hypernatremia  Sodium mildly elevated 146, most likely secondary to water deficit and dehydration  - Continue IVF    History of anxiety/depression  ADHD  PTA Zoloft 150 mg daily, trazodone 100 mg daily and Vistaril    Clinically Significant Risk Factors Present on Admission         # Hypernatremia: Highest Na = 146 mmol/L  in last 2 days, will monitor as appropriate    # Hypomagnesemia: Lowest Mg = 1.5 mg/dL in last 2 days, will replace as needed  # Anion Gap Metabolic Acidosis: Highest Anion Gap = 29 mmol/L in last 2 days, will monitor and treat as appropriate                       PT/OT: not needed  Diet: Regular Diet Adult    DVT Prophylaxis: Pneumatic Compression Devices  [place]ug[place] Catheter: Not present  Lines: None     Cardiac Monitoring: ACTIVE order. Indication: Lactic acidosis  Code Status: Full Code    Disposition: Anticipated discharge 1-3 days    Trever Nash MD  Hospitalist Service  [facility]  Securely message with Vocera (more info)  Text page via [employer] Paging/Directory     Data reviewed today: I reviewed all new labs and imaging results over the last 24 hours.    Physical Exam   Temp: 97.6  F (36.4  C) Temp src: Oral BP: 125/82 Pulse: 92   Resp: 17 SpO2: 94 % O2 Device: None (Room air)    Vitals:    11/03/23 2300   Weight: 83.9 kg (185 lb)     Vital Signs with Ranges  Temp:  [97.6  F (36.4  C)] 97.6  F (36.4  C)  Pulse:  [71-99] 92  Resp:  [5-26] 17  BP: (102-143)/() 125/82  SpO2:  [91 %-99 %] 94 %  No intake/output data recorded.  O2 requirements: none    Constitutional: Male, tremulous, disheveled  HEENT: Eyes nonicteric  Cardiovascular: RRR, normal S1/2, no m/r/g  Respiratory: CTAB, no wheezing or crackles  Vascular: No LE pitting edema  GI: Normoactive bowel sounds, nontender, nondistended  Skin/Integumen: No rashes  Neuro/Psych: Appropriate affect and mood. A&Ox3, moves all extremities    Medications    dextrose 5% and 0.45% NaCl + KCl 20 mEq/L 100 mL/hr at 11/04/23 1127      cloNIDine  0.1 mg Oral Q8H    [START ON 11/5/2023] folic acid  1 mg Oral Daily    [START ON 11/11/2023] gabapentin  100 mg Oral Q8H    [START ON 11/9/2023] gabapentin  300 mg Oral Q8H    [START ON 11/7/2023] gabapentin  600 mg Oral Q8H    gabapentin  900 mg Oral Q8H    [START ON 11/5/2023] multivitamin  w/minerals  1 tablet Oral Daily    pantoprazole  40 mg Intravenous Daily with breakfast    sertraline  100 mg Oral Daily    sodium chloride 0.9 % 1,000 mL with Infuvite Adult 10 mL, thiamine 100 mg, folic acid 1 mg infusion   Intravenous Once    [START ON 11/5/2023] thiamine  100 mg Oral Daily       Data   Recent Labs   Lab 11/03/23  2310   WBC 11.2*   HGB 15.0   MCV 88   *   *   POTASSIUM 3.8   CHLORIDE 100   CO2 17*   BUN 14.0   CR 0.83   ANIONGAP 29*   TEO 9.3   GLC 79   ALBUMIN 4.6   PROTTOTAL 7.7   BILITOTAL 0.2   ALKPHOS 80   ALT 16   AST 24   LIPASE 21       Imaging:   No results found for this or any previous visit (from the past 24 hour(s)).

## 2023-11-05 LAB
ALBUMIN SERPL BCG-MCNC: 4 G/DL (ref 3.5–5.2)
ANION GAP SERPL CALCULATED.3IONS-SCNC: 10 MMOL/L (ref 7–15)
BASOPHILS # BLD AUTO: 0 10E3/UL (ref 0–0.2)
BASOPHILS NFR BLD AUTO: 0 %
BUN SERPL-MCNC: 5.2 MG/DL (ref 6–20)
CALCIUM SERPL-MCNC: 9.3 MG/DL (ref 8.6–10)
CHLORIDE SERPL-SCNC: 105 MMOL/L (ref 98–107)
CREAT SERPL-MCNC: 0.75 MG/DL (ref 0.67–1.17)
DEPRECATED HCO3 PLAS-SCNC: 24 MMOL/L (ref 22–29)
EGFRCR SERPLBLD CKD-EPI 2021: >90 ML/MIN/1.73M2
EOSINOPHIL # BLD AUTO: 0 10E3/UL (ref 0–0.7)
EOSINOPHIL NFR BLD AUTO: 1 %
ERYTHROCYTE [DISTWIDTH] IN BLOOD BY AUTOMATED COUNT: 12.9 % (ref 10–15)
GLUCOSE SERPL-MCNC: 121 MG/DL (ref 70–99)
HCT VFR BLD AUTO: 39 % (ref 40–53)
HGB BLD-MCNC: 13.1 G/DL (ref 13.3–17.7)
IMM GRANULOCYTES # BLD: 0 10E3/UL
IMM GRANULOCYTES NFR BLD: 0 %
LYMPHOCYTES # BLD AUTO: 0.9 10E3/UL (ref 0.8–5.3)
LYMPHOCYTES NFR BLD AUTO: 16 %
MAGNESIUM SERPL-MCNC: 1.7 MG/DL (ref 1.7–2.3)
MCH RBC QN AUTO: 29.5 PG (ref 26.5–33)
MCHC RBC AUTO-ENTMCNC: 33.6 G/DL (ref 31.5–36.5)
MCV RBC AUTO: 88 FL (ref 78–100)
MONOCYTES # BLD AUTO: 0.5 10E3/UL (ref 0–1.3)
MONOCYTES NFR BLD AUTO: 9 %
NEUTROPHILS # BLD AUTO: 4.2 10E3/UL (ref 1.6–8.3)
NEUTROPHILS NFR BLD AUTO: 74 %
NRBC # BLD AUTO: 0 10E3/UL
NRBC BLD AUTO-RTO: 0 /100
PHOSPHATE SERPL-MCNC: 2.7 MG/DL (ref 2.5–4.5)
PLATELET # BLD AUTO: 360 10E3/UL (ref 150–450)
POTASSIUM SERPL-SCNC: 4 MMOL/L (ref 3.4–5.3)
RBC # BLD AUTO: 4.44 10E6/UL (ref 4.4–5.9)
SODIUM SERPL-SCNC: 139 MMOL/L (ref 135–145)
WBC # BLD AUTO: 5.6 10E3/UL (ref 4–11)

## 2023-11-05 PROCEDURE — 258N000003 HC RX IP 258 OP 636: Performed by: INTERNAL MEDICINE

## 2023-11-05 PROCEDURE — 82040 ASSAY OF SERUM ALBUMIN: CPT | Performed by: INTERNAL MEDICINE

## 2023-11-05 PROCEDURE — 120N000001 HC R&B MED SURG/OB

## 2023-11-05 PROCEDURE — 99232 SBSQ HOSP IP/OBS MODERATE 35: CPT | Performed by: INTERNAL MEDICINE

## 2023-11-05 PROCEDURE — 85004 AUTOMATED DIFF WBC COUNT: CPT | Performed by: INTERNAL MEDICINE

## 2023-11-05 PROCEDURE — 83735 ASSAY OF MAGNESIUM: CPT | Performed by: INTERNAL MEDICINE

## 2023-11-05 PROCEDURE — 250N000013 HC RX MED GY IP 250 OP 250 PS 637: Performed by: INTERNAL MEDICINE

## 2023-11-05 PROCEDURE — 36415 COLL VENOUS BLD VENIPUNCTURE: CPT | Performed by: INTERNAL MEDICINE

## 2023-11-05 RX ORDER — NALOXONE HYDROCHLORIDE 0.4 MG/ML
0.2 INJECTION, SOLUTION INTRAMUSCULAR; INTRAVENOUS; SUBCUTANEOUS
Status: DISCONTINUED | OUTPATIENT
Start: 2023-11-05 | End: 2023-11-06 | Stop reason: HOSPADM

## 2023-11-05 RX ORDER — NALOXONE HYDROCHLORIDE 0.4 MG/ML
0.4 INJECTION, SOLUTION INTRAMUSCULAR; INTRAVENOUS; SUBCUTANEOUS
Status: DISCONTINUED | OUTPATIENT
Start: 2023-11-05 | End: 2023-11-06 | Stop reason: HOSPADM

## 2023-11-05 RX ADMIN — LORAZEPAM 2 MG: 1 TABLET ORAL at 05:34

## 2023-11-05 RX ADMIN — SERTRALINE HYDROCHLORIDE 100 MG: 100 TABLET ORAL at 09:51

## 2023-11-05 RX ADMIN — NICOTINE 1 PATCH: 21 PATCH, EXTENDED RELEASE TRANSDERMAL at 09:56

## 2023-11-05 RX ADMIN — GABAPENTIN 900 MG: 300 CAPSULE ORAL at 11:15

## 2023-11-05 RX ADMIN — ACETAMINOPHEN 650 MG: 325 TABLET, FILM COATED ORAL at 05:34

## 2023-11-05 RX ADMIN — GABAPENTIN 900 MG: 300 CAPSULE ORAL at 05:26

## 2023-11-05 RX ADMIN — LEVETIRACETAM 500 MG: 500 TABLET, FILM COATED ORAL at 09:51

## 2023-11-05 RX ADMIN — HYDROXYZINE PAMOATE 50 MG: 25 CAPSULE ORAL at 17:24

## 2023-11-05 RX ADMIN — THIAMINE HCL TAB 100 MG 100 MG: 100 TAB at 09:51

## 2023-11-05 RX ADMIN — ACETAMINOPHEN 650 MG: 325 TABLET, FILM COATED ORAL at 19:05

## 2023-11-05 RX ADMIN — ACETAMINOPHEN 650 MG: 325 TABLET, FILM COATED ORAL at 11:15

## 2023-11-05 RX ADMIN — POTASSIUM CHLORIDE, DEXTROSE MONOHYDRATE AND SODIUM CHLORIDE: 150; 5; 450 INJECTION, SOLUTION INTRAVENOUS at 05:40

## 2023-11-05 RX ADMIN — FOLIC ACID 1 MG: 1 TABLET ORAL at 09:51

## 2023-11-05 RX ADMIN — MULTIPLE VITAMINS W/ MINERALS TAB 1 TABLET: TAB at 09:51

## 2023-11-05 RX ADMIN — PANTOPRAZOLE SODIUM 40 MG: 40 TABLET, DELAYED RELEASE ORAL at 05:27

## 2023-11-05 RX ADMIN — GABAPENTIN 900 MG: 300 CAPSULE ORAL at 19:04

## 2023-11-05 RX ADMIN — BUSPIRONE HYDROCHLORIDE 10 MG: 10 TABLET ORAL at 09:51

## 2023-11-05 RX ADMIN — CLONIDINE HYDROCHLORIDE 0.1 MG: 0.1 TABLET ORAL at 05:27

## 2023-11-05 RX ADMIN — HYDROXYZINE PAMOATE 50 MG: 25 CAPSULE ORAL at 11:15

## 2023-11-05 RX ADMIN — CLONIDINE HYDROCHLORIDE 0.1 MG: 0.1 TABLET ORAL at 11:15

## 2023-11-05 RX ADMIN — CLONIDINE HYDROCHLORIDE 0.1 MG: 0.1 TABLET ORAL at 19:05

## 2023-11-05 ASSESSMENT — ACTIVITIES OF DAILY LIVING (ADL)
ADLS_ACUITY_SCORE: 40
ADLS_ACUITY_SCORE: 40
ADLS_ACUITY_SCORE: 35
ADLS_ACUITY_SCORE: 40
ADLS_ACUITY_SCORE: 40
ADLS_ACUITY_SCORE: 35
ADLS_ACUITY_SCORE: 35
ADLS_ACUITY_SCORE: 40
ADLS_ACUITY_SCORE: 40
ADLS_ACUITY_SCORE: 39
ADLS_ACUITY_SCORE: 40
ADLS_ACUITY_SCORE: 35

## 2023-11-05 NOTE — PROGRESS NOTES
Summary: Alcohol withdrawal  DATE & TIME: 11/4/23 9475-1400    Cognitive Concerns/ Orientation : A&Ox4.     BEHAVIOR & AGGRESSION TOOL COLOR: Green   CIWA SCORE: 20, 4 for anxiety, tremor, auditory, tactile disturbances visual disturbances.    ABNL VS/O2: VSS, on RA   MOBILITY: Moves well in bed. Did not ambulate this shift.   PAIN MANAGMENT: Mild headache. PRN Tylenol given.   DIET: Regular   BOWEL/BLADDER: Continent  ABNL LAB/BG: None  this shift  DRAIN/DEVICES: 2 PIV Bilateral. D5 0.45NS+20K running at 100mL/hr   TELEMETRY RHYTHM: NSR   SKIN: WDL   TESTS/PROCEDURES: AM labs.   D/C DAY/GOALS/PLACE: Pending

## 2023-11-05 NOTE — PLAN OF CARE
Summary: Alcohol withdrawal  DATE & TIME: 11/05/23 Day shift    Cognitive Concerns/ Orientation : A&Ox4. Calm and cooperative with mild anxiety.   BEHAVIOR & AGGRESSION TOOL COLOR: Green   CIWA SCORE: 5, 4 for anxiety,- no ativan given  ABNL VS/O2: VSS, on RA   MOBILITY: Independent- walked around unit well  PAIN MANAGMENT: Mild headache. PRN Tylenol given.   DIET: Regular, good appetite.   BOWEL/BLADDER: WDL.  ABNL LAB/BG: NA  DRAIN/DEVICES: 2 PIV Bilateral.   TELEMETRY RHYTHM: NA  SKIN: WDL   TESTS/PROCEDURES: Psych and CD consulted  D/C DAY/GOALS/PLACE: Possible tomorrow  OTHER IMPORTANT INFO: Showered. Calls as needed.

## 2023-11-05 NOTE — PLAN OF CARE
Summary: Alcohol withdrawal  DATE & TIME: 11/4/23 Evenings     Cognitive Concerns/ Orientation : A&Ox4. Calm and cooperative with mild anxiety. Sleeping off and on through shift.    BEHAVIOR & AGGRESSION TOOL COLOR: Green   CIWA SCORE: 7, 4 for anxiety, mild tremor, mild auditory and visual disturbances.    ABNL VS/O2: VSS, on RA   MOBILITY: Moves well in bed. Did not ambulate this shift.   PAIN MANAGMENT: Mild headache. PRN Tylenol given.   DIET: Regular, good appetite.   BOWEL/BLADDER: WDL. Reported loose stool this AM, using urinal with good output.   ABNL LAB/BG: Na 146, Anion Gap 29, Mag 1.6, LA 0.6, 0.6, WBC 11.2, (+) Benzos and cannabis, BAL 0.21   DRAIN/DEVICES: 2 PIV Bilateral. D5 0.45NS+20K running at 100mL/hr   TELEMETRY RHYTHM: NSR   SKIN: WDL   TESTS/PROCEDURES: AM labs. Q4 LA checks   D/C DAY/GOALS/PLACE: Pending   OTHER IMPORTANT INFO: Bed alarm on for safety until we can assess ambulation. Rounded on freq. Calls as needed.

## 2023-11-05 NOTE — PROGRESS NOTES
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 Started drinking heavily since 22  - Discussed plan, he is interested in treatment, will have him see psych and CD tomorrow prior to anticipated discharge    Assessment & Plan   Summary: Chuck Magdaleno is a 33 year old male with PMH alcohol use disorder, alcohol withdrawal with seizures, MDD, marijuana use, anxiety disorder, ADHD, who was admitted on 11/3/2023 with acute alcohol withdrawal.    Acute alcohol withdrawal  Alcohol use disorder  Drinking heavily for the last 2 months daily 1 L of vodka now presented with nausea vomiting tremors dizziness and lightheadedness. Discharged from detox approximately 4 days ago and started drinking right away. He is withdrawing actively, his last drink was one day PTA. Blood alcohol level  on admission 0.21. He was given phenobarbital and diazepam in the ED  - Continue CIWA, thiamin, folate, benzos PRN  - Clonidine and gabapentin  - Fall precautions  - Psych and CD consult  - Stop IVF    Alcoholic gastritis, improved  Nausea and vomiting  Nausea and vomiting and mild epigastric discomfort secondary to alcoholic gastritis  - Continue PPI + PRN antiemetics  - Regular diet    Lactic and ketoacidosis, resolved  Metabolic acidosis, resolved    History of anxiety/depression  ADHD  PTA Zoloft 150 mg daily, trazodone 100 mg daily and Vistaril    Clinically Significant Risk Factors         # Hypernatremia: Highest Na = 146 mmol/L in last 2 days, will monitor as appropriate    # Hypomagnesemia: Lowest Mg = 1.5 mg/dL in last 2 days, will replace as needed  # Anion Gap Metabolic Acidosis: Highest Anion Gap = 29 mmol/L in last 2 days, will monitor and treat as appropriate                         PT/OT: not needed  Diet:  Regular Diet Adult    DVT Prophylaxis: Pneumatic Compression Devices  Eugene Catheter: Not present  Lines: None     Cardiac Monitoring: None  Code Status: Full Code    Disposition: Anticipated discharge tomorrow    Trever Nash MD  Hospitalist Service  [facility]  Securely message with [employer] (more info)  Text page via [employer] Paging/Directory     Data reviewed today: I reviewed all new labs and imaging results over the last 24 hours.    Physical Exam   Temp: 98.3  F (36.8  C) Temp src: Oral BP: (!) 124/90 Pulse: 67   Resp: 18 SpO2: 97 % O2 Device: None (Room air)    Vitals:    11/03/23 2300 11/05/23 0640   Weight: 83.9 kg (185 lb) 82.7 kg (182 lb 4.8 oz)     Vital Signs with Ranges  Temp:  [97.5  F (36.4  C)-98.8  F (37.1  C)] 98.3  F (36.8  C)  Pulse:  [] 67  Resp:  [16-22] 18  BP: (113-137)/(70-90) 124/90  SpO2:  [94 %-97 %] 97 %  I/O last 3 completed shifts:  In: -   Out: 2100 [Urine:2100]  O2 requirements: none    Constitutional: Male, tremulous, improved from yesterday  HEENT: Eyes nonicteric, mild buccal fasciculation, no significant nystagmus  Cardiovascular: RRR, normal S1/2, no m/r/g  Respiratory: CTAB, no wheezing or crackles  Vascular: No LE pitting edema  GI: Normoactive bowel sounds, nontender, nondistended  Skin/Integumen: No rashes  Neuro/Psych: Appropriate affect and mood. A&Ox3, moves all extremities    Medications        busPIRone  10 mg Oral BID    cloNIDine  0.1 mg Oral Q8H    folic acid  1 mg Oral Daily    [START ON 11/11/2023] gabapentin  100 mg Oral Q8H    [START ON 11/9/2023] gabapentin  300 mg Oral Q8H    [START ON 11/7/2023] gabapentin  600 mg Oral Q8H    gabapentin  900 mg Oral Q8H    levETIRAcetam  500 mg Oral Daily    multivitamin w/minerals  1 tablet Oral Daily    nicotine  1 patch Transdermal Daily    And    nicotine   Transdermal Q8H VIOLET    pantoprazole  40 mg Oral QAM AC    sertraline  100 mg Oral Daily    sodium chloride 0.9 % 1,000 mL with  Infuvite Adult 10 mL, thiamine 100 mg, folic acid 1 mg infusion   Intravenous Once    thiamine  100 mg Oral Daily       Data   Recent Labs   Lab 11/05/23  0718 11/03/23  2310   WBC 5.6 11.2*   HGB 13.1* 15.0   MCV 88 88    534*    146*   POTASSIUM 4.0 3.8   CHLORIDE 105 100   CO2 24 17*   BUN 5.2* 14.0   CR 0.75 0.83   ANIONGAP 10 29*   TEO 9.3 9.3   * 79   ALBUMIN 4.0 4.6   PROTTOTAL  --  7.7   BILITOTAL  --  0.2   ALKPHOS  --  80   ALT  --  16   AST  --  24   LIPASE  --  21       Imaging:   No results found for this or any previous visit (from the past 24 hour(s)).

## 2023-11-06 VITALS
SYSTOLIC BLOOD PRESSURE: 118 MMHG | WEIGHT: 178.6 LBS | TEMPERATURE: 98.3 F | DIASTOLIC BLOOD PRESSURE: 86 MMHG | RESPIRATION RATE: 18 BRPM | BODY MASS INDEX: 24.91 KG/M2 | OXYGEN SATURATION: 98 % | HEART RATE: 55 BPM

## 2023-11-06 LAB
MAGNESIUM SERPL-MCNC: 1.8 MG/DL (ref 1.7–2.3)
POTASSIUM SERPL-SCNC: 4.2 MMOL/L (ref 3.4–5.3)

## 2023-11-06 PROCEDURE — 99239 HOSP IP/OBS DSCHRG MGMT >30: CPT | Performed by: INTERNAL MEDICINE

## 2023-11-06 PROCEDURE — 36415 COLL VENOUS BLD VENIPUNCTURE: CPT | Performed by: INTERNAL MEDICINE

## 2023-11-06 PROCEDURE — 250N000013 HC RX MED GY IP 250 OP 250 PS 637: Performed by: INTERNAL MEDICINE

## 2023-11-06 PROCEDURE — 83735 ASSAY OF MAGNESIUM: CPT | Performed by: INTERNAL MEDICINE

## 2023-11-06 PROCEDURE — H0001 ALCOHOL AND/OR DRUG ASSESS: HCPCS

## 2023-11-06 PROCEDURE — 84132 ASSAY OF SERUM POTASSIUM: CPT | Performed by: INTERNAL MEDICINE

## 2023-11-06 RX ORDER — PANTOPRAZOLE SODIUM 40 MG/1
40 TABLET, DELAYED RELEASE ORAL
Qty: 14 TABLET | Refills: 0 | Status: SHIPPED | OUTPATIENT
Start: 2023-11-07 | End: 2023-11-09

## 2023-11-06 RX ORDER — GABAPENTIN 300 MG/1
CAPSULE ORAL
Qty: 12 CAPSULE | Refills: 0 | Status: ON HOLD | OUTPATIENT
Start: 2023-11-06 | End: 2023-11-11

## 2023-11-06 RX ADMIN — MULTIPLE VITAMINS W/ MINERALS TAB 1 TABLET: TAB at 09:28

## 2023-11-06 RX ADMIN — LEVETIRACETAM 500 MG: 500 TABLET, FILM COATED ORAL at 09:28

## 2023-11-06 RX ADMIN — FOLIC ACID 1 MG: 1 TABLET ORAL at 09:28

## 2023-11-06 RX ADMIN — BUSPIRONE HYDROCHLORIDE 10 MG: 10 TABLET ORAL at 09:28

## 2023-11-06 RX ADMIN — CLONIDINE HYDROCHLORIDE 0.1 MG: 0.1 TABLET ORAL at 12:10

## 2023-11-06 RX ADMIN — GABAPENTIN 900 MG: 300 CAPSULE ORAL at 12:10

## 2023-11-06 RX ADMIN — PANTOPRAZOLE SODIUM 40 MG: 40 TABLET, DELAYED RELEASE ORAL at 04:50

## 2023-11-06 RX ADMIN — CLONIDINE HYDROCHLORIDE 0.1 MG: 0.1 TABLET ORAL at 04:50

## 2023-11-06 RX ADMIN — SERTRALINE HYDROCHLORIDE 100 MG: 100 TABLET ORAL at 09:28

## 2023-11-06 RX ADMIN — THIAMINE HCL TAB 100 MG 100 MG: 100 TAB at 09:28

## 2023-11-06 RX ADMIN — GABAPENTIN 900 MG: 300 CAPSULE ORAL at 04:50

## 2023-11-06 RX ADMIN — NICOTINE 1 PATCH: 21 PATCH, EXTENDED RELEASE TRANSDERMAL at 09:31

## 2023-11-06 ASSESSMENT — ACTIVITIES OF DAILY LIVING (ADL)
ADLS_ACUITY_SCORE: 40
ADLS_ACUITY_SCORE: 36

## 2023-11-06 NOTE — CONSULTS
11/6/2023    Pt has been interviewed via telephone and CD Consult has been completed. Email has been sent to unit  with [facility] for pt to sign. Pt reports he has been in contact with [facility] and they are expecting his eval. Pt gave verbal permission for evaluation to be sent ASAP. Referral has been sent to [facility] for review.     Recommendations:      Abstain from all non-prescribed mood-altering substances  Take all medications as prescribed  Enter and complete a MICD IOP with lodging treatment program  Increase sober support, attend support meetings several times weekly  Follow up with scheduling and attending therapy sessions        6.   Follow all recommendations of your medical providers      Clinical Substantiation:    Pt reports he has been to CD treatment several times in the past. Pt reports he was able to remain sober by attending AA, meeting with a sponsor, working out, and being involved with the sober community. Pt reports he has lost jobs due to drinking and has DUI's. Pt reports he would like to attend CD treatment with [facility]. Pt reports he would like to be sober, attend meetings, see a therapist and med manager and get a job when he is ready. Pt reports he's motivated to make the changes necessary to be sober.      Referrals/ Alternatives:  [facility] [place]  Phone: [phone]  Fax: [phone]     DOMENIC consult completed by: NELLY Medel.  Phone Number: [phone]  E-mail Address: [email or web address] Evaluation Department  [address] [place] [address]     *Due to regulation of Title 42 of the Code of Federal Regulations (CFR) Part 2: Confidentiality laws apply to this note and the information wherein.  Thus, this note cannot be copy and pasted into any other health care staff's note nor can it be included in general medical records sent to ANY outside agency without the  patient's written consent.

## 2023-11-06 NOTE — DISCHARGE SUMMARY
[facility]    Hospitalist Discharge Summary       Date of Admission:  11/3/2023  Date of Discharge:  11/6/2023  Discharging Provider: Trever Nash MD      Discharge Diagnoses   Acute alcohol withdrawal  Alcoholic gastritis  Alcohol use disorder    Follow-ups Needed After Discharge   Follow-up Appointments     Follow-up and recommended labs and tests       Follow up with primary care provider, within 7 days for hospital follow-   up.  No follow up labs or test are needed.  - Follow up with treatment  - Follow up with psychiatry or psychology to discuss your alcohol use   disorder          Hospital Course   Chuck Magdaleno is a 33 year old male with PMH alcohol use disorder, alcohol withdrawal with seizures, MDD, marijuana use, anxiety disorder, ADHD, who was admitted on 11/3/2023 with acute alcohol withdrawal.  Drinking heavily for the last 2 months daily 1 L of vodka now presented with nausea vomiting tremors dizziness and lightheadedness. Discharged from detox approximately 4 days prior to admission and started drinking right away. He is withdrawing actively, his last drink was one day PTA. Blood alcohol level  on admission 0.21. He was given phenobarbital and diazepam in the ED.  Treated for alcohol withdrawal with CIWA protocol and Ativan PRN, as well as Clonidine and gabapentin, with improvement. Psych and CD consulted for this patient this admission, he appears to be motivated to abstain from alcohol and reenter treatment. He is provided a gabapentin taper at discharge and 2 weeks of PPI due to his presenting gastritis.    Alcoholic gastritis, improved  Nausea and vomiting and mild epigastric discomfort secondary to alcoholic gastritis, improved this admission  - Continue PPI for 2 weeks    History of anxiety/depression  ADHD  PTA Zoloft 150 mg daily, trazodone 100 mg daily and Vistaril    Clinically Significant Risk Factors            # Hypomagnesemia: Lowest Mg = 1.6 mg/dL  in last 2 days, will replace as needed                           Consultations This Hospital Stay   CARE MANAGEMENT / SOCIAL WORK IP CONSULT  PSYCHIATRY IP CONSULT  CHEMICAL DEPENDENCY IP CONSULT    Code Status   Full Code    Time Spent on this Encounter   I, Trever Nash, personally saw the patient today and spent approximately 35 minutes discharging this patient.       Trever Nash MD  [facility]  ______________________________________________________________________    Physical Exam   Vital Signs: Temp: 98.3  F (36.8  C) Temp src: Oral BP: 111/68 Pulse: 55   Resp: 18 SpO2: 98 % O2 Device: None (Room air)    Weight: 178 lbs 9.6 oz    Constitutional: Male in NAD  HEENT: Eyes nonicteric  Cardiovascular: RRR, normal S1/2, no m/r/g  Respiratory: CTAB, no wheezing or crackles  Vascular: No LE pitting edema  GI: Normoactive bowel sounds, nontender, nondistended  Skin/Integumen: No rashes  Neuro/Psych: Appropriate affect and mood. A&Ox3, moves all extremities       Primary Care Physician   Physician No Ref-Primary    Discharge Disposition   Discharged to home  Condition at discharge: Stable    Significant Results and Procedures   Most Recent 3 CBC's:  Recent Labs   Lab Test 11/05/23  0718 11/03/23  2310 10/17/23  1347   WBC 5.6 11.2* 7.0   HGB 13.1* 15.0 15.4   MCV 88 88 86    534* 386     Most Recent 3 BMP's:  Recent Labs   Lab Test 11/05/23  0718 11/03/23  2310 10/17/23  1347    146* 142   POTASSIUM 4.0 3.8 4.1   CHLORIDE 105 100 104   CO2 24 17* 25   BUN 5.2* 14.0 9.4   CR 0.75 0.83 0.75   ANIONGAP 10 29* 13   TEO 9.3 9.3 9.8   * 79 116*     Most Recent 2 LFT's:  Recent Labs   Lab Test 11/03/23  2310 10/17/23  1347   AST 24 22   ALT 16 22   ALKPHOS 80 68   BILITOTAL 0.2 0.2     Most Recent 3 INR's:  Recent Labs   Lab Test 10/17/23  1347   INR 0.91     Most Recent 3 Troponin's:No lab results found.  Most Recent 3 BNP's:No lab results found.  Most Recent D-dimer:No  lab results found.  Most Recent Cholesterol Panel:No lab results found.    Results for orders placed or performed during the hospital encounter of 03/12/23   Head CT w/o contrast    Narrative    EXAM: CT HEAD W/O CONTRAST, CT CERVICAL SPINE W/O CONTRAST, CTA HEAD NECK W CONTRAST  LOCATION: [facility]  DATE/TIME: 3/12/2023 7:27 PM    INDICATION:  Altered mental status, unequal pupils  COMPARISON:  CT head 05/04/2014.  CONTRAST: 75 mL Isovue 370  TECHNIQUE:   1) Head and neck CT angiogram with IV contrast. Noncontrast head CT followed by axial helical CT images of the head and neck vessels obtained during the arterial phase of intravenous contrast administration. Axial 2D reconstructed images and multiplanar   3D MIP reconstructed images of the head and neck vessels were performed by the technologist. Dose reduction techniques were used. All stenosis measurements made according to NASCET criteria unless otherwise specified.  2) Routine CT Cervical Spine without IV contrast. Multiplanar reformats. Dose reduction techniques were used.    FINDINGS:   NONCONTRAST HEAD CT:   INTRACRANIAL CONTENTS: No intracranial hemorrhage, extraaxial collection, or mass effect.  No CT evidence of acute infarct. Normal parenchymal attenuation. Normal ventricles and sulci.     VISUALIZED ORBITS/SINUSES/MASTOIDS: No intraorbital abnormality. No significant paranasal sinus mucosal disease. No middle ear or mastoid effusion.    BONES/SOFT TISSUES: No acute abnormality.    HEAD CTA:  Examination degraded by bolus timing.  ANTERIOR CIRCULATION: No stenosis/occlusion, aneurysm, or high flow vascular malformation. Standard [place] anatomy.    POSTERIOR CIRCULATION: No stenosis/occlusion, aneurysm, or high flow vascular malformation. Dominant right and smaller left vertebral artery contribute to a normal basilar artery.     DURAL VENOUS SINUSES: Expected enhancement of the major dural venous  sinuses.    NECK CTA:  RIGHT CAROTID: No measurable stenosis or dissection.    LEFT CAROTID: No measurable stenosis or dissection.    VERTEBRAL ARTERIES: No focal stenosis or dissection. Dominant right and smaller left vertebral arteries.    AORTIC ARCH: Classic aortic arch anatomy with no significant stenosis at the origin of the great vessels.    NONVASCULAR STRUCTURES: Unremarkable.    CERVICAL SPINE CT:   VERTEBRA: Normal vertebral body heights and alignment. No acute compression fracture or posttraumatic subluxation.     CANAL/FORAMINA: No significant canal or neural foraminal stenosis.    PARASPINAL: No acute extraspinal abnormality.       Impression    IMPRESSION:   HEAD CT:  1.  No acute intracranial process.    HEAD CTA:   1.  No large vessel occlusion or hemodynamically significant stenosis.    NECK CTA:  1.  No large vessel occlusion or hemodynamically significant stenosis.    CERVICAL SPINE CT:  1.  No CT evidence for acute fracture or post traumatic subluxation.   CTA Head Neck with Contrast    Narrative    EXAM: CT HEAD W/O CONTRAST, CT CERVICAL SPINE W/O CONTRAST, CTA HEAD NECK W CONTRAST  LOCATION: [facility]  DATE/TIME: 3/12/2023 7:27 PM    INDICATION:  Altered mental status, unequal pupils  COMPARISON:  CT head 05/04/2014.  CONTRAST: 75 mL Isovue 370  TECHNIQUE:   1) Head and neck CT angiogram with IV contrast. Noncontrast head CT followed by axial helical CT images of the head and neck vessels obtained during the arterial phase of intravenous contrast administration. Axial 2D reconstructed images and multiplanar   3D MIP reconstructed images of the head and neck vessels were performed by the technologist. Dose reduction techniques were used. All stenosis measurements made according to NASCET criteria unless otherwise specified.  2) Routine CT Cervical Spine without IV contrast. Multiplanar reformats. Dose reduction techniques were used.    FINDINGS:    NONCONTRAST HEAD CT:   INTRACRANIAL CONTENTS: No intracranial hemorrhage, extraaxial collection, or mass effect.  No CT evidence of acute infarct. Normal parenchymal attenuation. Normal ventricles and sulci.     VISUALIZED ORBITS/SINUSES/MASTOIDS: No intraorbital abnormality. No significant paranasal sinus mucosal disease. No middle ear or mastoid effusion.    BONES/SOFT TISSUES: No acute abnormality.    HEAD CTA:  Examination degraded by bolus timing.  ANTERIOR CIRCULATION: No stenosis/occlusion, aneurysm, or high flow vascular malformation. Standard [place] anatomy.    POSTERIOR CIRCULATION: No stenosis/occlusion, aneurysm, or high flow vascular malformation. Dominant right and smaller left vertebral artery contribute to a normal basilar artery.     DURAL VENOUS SINUSES: Expected enhancement of the major dural venous sinuses.    NECK CTA:  RIGHT CAROTID: No measurable stenosis or dissection.    LEFT CAROTID: No measurable stenosis or dissection.    VERTEBRAL ARTERIES: No focal stenosis or dissection. Dominant right and smaller left vertebral arteries.    AORTIC ARCH: Classic aortic arch anatomy with no significant stenosis at the origin of the great vessels.    NONVASCULAR STRUCTURES: Unremarkable.    CERVICAL SPINE CT:   VERTEBRA: Normal vertebral body heights and alignment. No acute compression fracture or posttraumatic subluxation.     CANAL/FORAMINA: No significant canal or neural foraminal stenosis.    PARASPINAL: No acute extraspinal abnormality.       Impression    IMPRESSION:   HEAD CT:  1.  No acute intracranial process.    HEAD CTA:   1.  No large vessel occlusion or hemodynamically significant stenosis.    NECK CTA:  1.  No large vessel occlusion or hemodynamically significant stenosis.    CERVICAL SPINE CT:  1.  No CT evidence for acute fracture or post traumatic subluxation.   XR Hand Right G/E 3 Views    Narrative    EXAM: XR HAND RIGHT G/E 3 VIEWS  LOCATION: [facility]  [facility]  DATE/TIME: 3/12/2023 7:02 PM    INDICATION: Right hand pain.  COMPARISON: 05/04/2014.      Impression    IMPRESSION:   1.  No acute fracture or joint malalignment.  2.  Interval healing of the radius styloid fracture.    Cervical spine CT w/o contrast    Narrative    EXAM: CT HEAD W/O CONTRAST, CT CERVICAL SPINE W/O CONTRAST, CTA HEAD NECK W CONTRAST  LOCATION: [facility]  DATE/TIME: 3/12/2023 7:27 PM    INDICATION:  Altered mental status, unequal pupils  COMPARISON:  CT head 05/04/2014.  CONTRAST: 75 mL Isovue 370  TECHNIQUE:   1) Head and neck CT angiogram with IV contrast. Noncontrast head CT followed by axial helical CT images of the head and neck vessels obtained during the arterial phase of intravenous contrast administration. Axial 2D reconstructed images and multiplanar   3D MIP reconstructed images of the head and neck vessels were performed by the technologist. Dose reduction techniques were used. All stenosis measurements made according to NASCET criteria unless otherwise specified.  2) Routine CT Cervical Spine without IV contrast. Multiplanar reformats. Dose reduction techniques were used.    FINDINGS:   NONCONTRAST HEAD CT:   INTRACRANIAL CONTENTS: No intracranial hemorrhage, extraaxial collection, or mass effect.  No CT evidence of acute infarct. Normal parenchymal attenuation. Normal ventricles and sulci.     VISUALIZED ORBITS/SINUSES/MASTOIDS: No intraorbital abnormality. No significant paranasal sinus mucosal disease. No middle ear or mastoid effusion.    BONES/SOFT TISSUES: No acute abnormality.    HEAD CTA:  Examination degraded by bolus timing.  ANTERIOR CIRCULATION: No stenosis/occlusion, aneurysm, or high flow vascular malformation. Standard [place] anatomy.    POSTERIOR CIRCULATION: No stenosis/occlusion, aneurysm, or high flow vascular malformation. Dominant right and smaller left vertebral artery contribute  to a normal basilar artery.     DURAL VENOUS SINUSES: Expected enhancement of the major dural venous sinuses.    NECK CTA:  RIGHT CAROTID: No measurable stenosis or dissection.    LEFT CAROTID: No measurable stenosis or dissection.    VERTEBRAL ARTERIES: No focal stenosis or dissection. Dominant right and smaller left vertebral arteries.    AORTIC ARCH: Classic aortic arch anatomy with no significant stenosis at the origin of the great vessels.    NONVASCULAR STRUCTURES: Unremarkable.    CERVICAL SPINE CT:   VERTEBRA: Normal vertebral body heights and alignment. No acute compression fracture or posttraumatic subluxation.     CANAL/FORAMINA: No significant canal or neural foraminal stenosis.    PARASPINAL: No acute extraspinal abnormality.       Impression    IMPRESSION:   HEAD CT:  1.  No acute intracranial process.    HEAD CTA:   1.  No large vessel occlusion or hemodynamically significant stenosis.    NECK CTA:  1.  No large vessel occlusion or hemodynamically significant stenosis.    CERVICAL SPINE CT:  1.  No CT evidence for acute fracture or post traumatic subluxation.       Discharge Orders      Reason for your hospital stay    You were hospitalized for alcohol withdrawal     Follow-up and recommended labs and tests     Follow up with primary care provider, within 7 days for hospital follow- up.  No follow up labs or test are needed.  - Follow up with treatment  - Follow up with psychiatry or psychology to discuss your alcohol use disorder     Activity    Your activity upon discharge: activity as tolerated     Diet    Follow this diet upon discharge:       Regular Diet Adult     Discharge Medications   Current Discharge Medication List        START taking these medications    Details   gabapentin (NEURONTIN) 300 MG capsule Take 2 capsules (600 mg) by mouth 3 times daily for 3 days, THEN 1 capsule (300 mg) 3 times daily for 3 days, THEN 1 capsule (300 mg) daily for 3 days.  Qty: 12 capsule, Refills: 0     Associated Diagnoses: Alcohol dependence with withdrawal, uncomplicated (H)      pantoprazole (PROTONIX) 40 MG EC tablet Take 1 tablet (40 mg) by mouth every morning (before breakfast) for 14 days  Qty: 14 tablet, Refills: 0    Associated Diagnoses: Alcohol dependence with withdrawal, uncomplicated (H)           CONTINUE these medications which have NOT CHANGED    Details   busPIRone (BUSPAR) 10 MG tablet Take 10 mg by mouth 2 times daily      cholecalciferol 50 MCG (2000 UT) CAPS Take 50 mcg by mouth daily      folic acid (FOLVITE) 1 MG tablet Take 1 tablet (1 mg) by mouth daily  Qty: 30 tablet, Refills: 0    Associated Diagnoses: Alcohol dependence with withdrawal, uncomplicated (H)      hydrOXYzine (VISTARIL) 50 MG capsule Take 50 mg by mouth 3 times daily as needed for anxiety      levETIRAcetam (KEPPRA) 500 MG tablet Take 500 mg by mouth daily      Multiple Vitamin (TAB-A-TYLER/BETA CAROTENE) TABS Take 1 tablet by mouth daily      nicotine (NICODERM CQ) 21 MG/24HR 24 hr patch Place 1 patch onto the skin every 24 hours      sertraline (ZOLOFT) 100 MG tablet Take 150 mg by mouth daily      thiamine (B-1) 100 MG tablet Take 1 tablet (100 mg) by mouth daily  Qty: 30 tablet, Refills: 0    Associated Diagnoses: Alcohol dependence with withdrawal, uncomplicated (H)      traZODone (DESYREL) 100 MG tablet Take 100 mg by mouth nightly as needed for sleep           Allergies   No Known Allergies

## 2023-11-06 NOTE — PLAN OF CARE
Summary: Alcohol withdrawal  DATE & TIME: 11/05/23 3694-9412   Cognitive Concerns/ Orientation : A&Ox4. Calm and cooperative with mild anxiety. Pt reports improvements from yesterday.   BEHAVIOR & AGGRESSION TOOL COLOR: Green   CIWA SCORE: 4 for anxiety, mild tremor, clammy.   ABNL VS/O2: VSS, on RA   MOBILITY: Independent, encouraged ambulating.   PAIN MANAGMENT: Mild headache abd temp fluctuations. PRN Tylenol given.   DIET: Regular, good appetite.   BOWEL/BLADDER: WDL  ABNL LAB/BG: Urea 5.2, Hgb 13.1  DRAIN/DEVICES: PIV in R AC SL, WDL   TELEMETRY RHYTHM: NA  SKIN: WDL   TESTS/PROCEDURES: Psych and CD consulted. Pt is looking into facility for treatment and reports positive support outside hospital.   D/C DAY/GOALS/PLACE: Possible tomorrow after CD consult and assessment.   OTHER IMPORTANT INFO: Showered on days. Calls as needed.

## 2023-11-06 NOTE — PLAN OF CARE
Summary: Alcohol withdrawal  DATE & TIME: 11/05/23 2503-3730  Cognitive Concerns/ Orientation : A&Ox4. Calm and cooperative this shift.   BEHAVIOR & AGGRESSION TOOL COLOR: Green   CIWA SCORE: 0 this shift, Sleeping well  ABNL VS/O2: VSS, on RA   MOBILITY: Independent, encouraged ambulating.   PAIN MANAGMENT: Mild headache abd temp fluctuations. PRN Tylenol given.   DIET: Regular, good appetite.   BOWEL/BLADDER: WDL  ABNL LAB/BG: Urea 5.2, Hgb 13.1  DRAIN/DEVICES: PIV in R AC SL, WDL   TELEMETRY RHYTHM: NA  SKIN: WDL   TESTS/PROCEDURES: Psych and CD consulted. Pt is looking into facility for treatment and reports positive support outside hospital.   D/C DAY/GOALS/PLACE: Possible tomorrow after CD consult and assessment.   OTHER IMPORTANT INFO: Showered on days. Calls as needed.

## 2023-11-06 NOTE — CONSULTS
Care Management Initial Consult    General Information  Acknowledging Care Management consult for Rule 25 assessment.  Patient's CD assessment was competed by NELLY Medel.  Assessment completed with: Writer with patient for the sole purpose of obtaining his signature for a YONY to [facility].  Writer explains he has had contact with their intake staff and all they are waiting for is the SUDS assessment.   Writer obtained his signature. The YONY has been scanned to Rima Aguilera and placed in his paper chart.           Primary Care Provider verified and updated as needed:     Readmission within the last 30 days:           Advance Care Planning:            Communication Assessment  Patient's communication style: spoken language (English or Bilingual)             Cognitive  Cognitive/Neuro/Behavioral: WDL  Level of Consciousness: alert        Mood/Behavior: behavior appropriate to situation          Living Environment:   People in home:       Current living Arrangements:        Able to return to prior arrangements:         Family/Social Support:  Care provided by:    Provides care for:                  Description of Support System:           Current Resources:   Patient receiving home care services:       Community Resources:    Equipment currently used at home:    Supplies currently used at home:      Employment/Financial:  Employment Status:          Financial Concerns:             Does the patient's insurance plan have a 3 day qualifying hospital stay waiver?  No    Lifestyle & Psychosocial Needs:  Social Determinants of Health     Food Insecurity: Not on file   Depression: Not on file   Housing Stability: Not on file   Tobacco Use: High Risk (11/4/2023)    Patient History     Smoking Tobacco Use: Every Day     Smokeless Tobacco Use: Never     Passive Exposure: Not on file   Financial Resource Strain: Not on file   Alcohol Use: Not on file   Transportation Needs: Not on file   Physical  Activity: Not on file   Interpersonal Safety: Not on file   Stress: Not on file   Social Connections: Not on file       Functional Status:  Prior to admission patient needed assistance:              Mental Health Status:          Chemical Dependency Status:                Values/Beliefs:  Spiritual, Cultural Beliefs, Uatsdin Practices, Values that affect care:                 Additional Information:      Mindi Medellin, [facility] HospitalSW
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
## 2023-11-06 NOTE — CONSULTS
Triage and Transition - Consult and Liaison     Chuck Magdaleno  November 5, 2023    Session start: 8:17 am  Session end: 8:38 am   Session duration in minutes: 21 min  CPT utilized: 33945 - Brief diagnostic assessment (modifier 52)  Patient was seen in-person.    Diagnosis:   Substance-Related & Addictive Disorders Alcohol Use Disorder   303.90 (F10.20) Severe   , by history;      Plan/Recommendations:   Patient recommended to complete inpatient chemical dependency treatment followed by sober housing. He expresses interest in this and has been coordinating with [facility] to do a long term program there (90 days) followed by sober housing. CD to meet with patient today.   Recommended patient connect with therapist and psychiatrist throughout recovery- he indicates [facility] has both services.   Please enter another psychiatry if further visits are needed. Patients are not followed by Psychiatry C&L Service unless otherwise indicated.     Reason for consult: Psychiatry consult was requested due to alcohol use. Patient was seen by Triage and Transition Consult & Liaison team.     Identifying information: Chuck is 33 year old White  male   followed related to alcohol withdrawal.     Brief Psychosocial History  Patient lives with family. He is unemployed.     Summary of Patient Situation  This is patient's 11th presentation to the hospital in the last year for alcohol withdrawal/complications, with three in the past month (10/13 MARIE .38, 10/17 MARIE .51 , 11/3 Marie .21 current). Records indicate he has told providers he had period of 4-5 months of sobriety until this recent relapse, but admissions show this is false. He notes that he has previously gone through withdrawal and had withdrawal seizures several times when he has gone through alcohol withdrawal. He was admitted on 10/17 with blood alcohol level of .51, was hospitalized for five days and then went to [facility] in [place], he left this program a  "few days ago. He reports on 10/17 he intentionally drank 3 liters of vodka to end his life. He states looking back now he knows that is not what he wants. He reports long history of anxiety, stating his brain doesn't shut off, continously going and going, spirals into thinking the worst. He reports a few shots of vodka and that turns off. He states he was doing really well last spring, went to the retreat 30 days inpatient, then sober living. He states he then strted \"cutting corners\" and stopped going to as many meetings. He then went golfing and that set off a relapse that essentially hasn't stopped since July. Patient reports he lost his housing, living in his car for a while drinking a liter a day. He reports he is motivated to stop drinking. He plans to do a long term program, thinks he needs a year program. He reports he has talked to [facility] and they have a 90 day program with [facility], then would move into their sober housing. He thinks he needs to address trauma and anxiety to help manage his sobriety.     Significant Clinical History  Treatment: [facility], [facility] in [place], [facility] (April 2023)    Medications: sertraline, hydroxyzine, trazodone, naltrexone (didn't find it helpful), buspar,     Hospitalization, ECT, commitment: none    SIB/SI history:  He denies any history of suicide attempts, non-suicidal self-harm behavior, nor harm to others     Current Providers  none    Collateral information:   Reviewed chart and coordinated with patient's mom.     Mental Status Exam   Affect: Appropriate  Appearance: Appropriate   Attention Span/Concentration: Attentive    Eye Contact: Engaged  Fund of Knowledge: Appropriate   Language /Speech Content: Fluent  Language /Speech Volume: Normal   Language /Speech Rate/Productions: Normal   Recent Memory: Intact  Remote Memory: Intact  Mood: Normal   Orientation:   Person: Yes   Place: Yes  Time of Day: Yes   Date: Yes   Situation (Do they understand why they are " here?): Yes   Psychomotor Behavior: Normal   Thought Content: Clear  Thought Form: Intact    Current medications:   Current Facility-Administered Medications   Medication    acetaminophen (TYLENOL) tablet 650 mg    Or    acetaminophen (TYLENOL) Suppository 650 mg    busPIRone (BUSPAR) tablet 10 mg    cloNIDine (CATAPRES) tablet 0.1 mg    flumazenil (ROMAZICON) injection 0.2 mg    folic acid (FOLVITE) tablet 1 mg    [START ON 11/11/2023] gabapentin (NEURONTIN) capsule 100 mg    [START ON 11/9/2023] gabapentin (NEURONTIN) capsule 300 mg    [START ON 11/7/2023] gabapentin (NEURONTIN) capsule 600 mg    gabapentin (NEURONTIN) capsule 900 mg    OLANZapine zydis (zyPREXA) ODT tab 5-10 mg    Or    haloperidol lactate (HALDOL) injection 2.5-5 mg    hydrOXYzine (VISTARIL) capsule 50 mg    levETIRAcetam (KEPPRA) tablet 500 mg    LORazepam (ATIVAN) tablet 1-2 mg    Or    LORazepam (ATIVAN) injection 1-2 mg    melatonin tablet 5 mg    multivitamin w/minerals (THERA-VIT-M) tablet 1 tablet    naloxone (NARCAN) injection 0.2 mg    Or    naloxone (NARCAN) injection 0.4 mg    Or    naloxone (NARCAN) injection 0.2 mg    Or    naloxone (NARCAN) injection 0.4 mg    nicotine (NICODERM CQ) 21 MG/24HR 24 hr patch 1 patch    And    nicotine Patch in Place    ondansetron (ZOFRAN ODT) ODT tab 4 mg    Or    ondansetron (ZOFRAN) injection 4 mg    oxyCODONE (ROXICODONE) tablet 5 mg    pantoprazole (PROTONIX) EC tablet 40 mg    polyethylene glycol (MIRALAX) Packet 17 g    prochlorperazine (COMPAZINE) injection 10 mg    Or    prochlorperazine (COMPAZINE) tablet 10 mg    Or    prochlorperazine (COMPAZINE) suppository 25 mg    senna-docusate (SENOKOT-S/PERICOLACE) 8.6-50 MG per tablet 1 tablet    Or    senna-docusate (SENOKOT-S/PERICOLACE) 8.6-50 MG per tablet 2 tablet    sertraline (ZOLOFT) tablet 100 mg    sodium chloride 0.9 % 1,000 mL with Infuvite Adult 10 mL, thiamine 100 mg, folic acid 1 mg infusion    thiamine (B-1) tablet 100 mg     traZODone (DESYREL) tablet 100 mg        Therapeutic intervention and progress:  Therapeutic intervention consisted of building therapeutic rapport, active listening, validation, engaging in learning/practicing coping skills, and active problem solving.     Sudha Carr, [facility]   Triage and Transition - Consult and Liaison   [phone]

## 2023-11-06 NOTE — PLAN OF CARE
Goal Outcome Evaluation:    DATE & TIME: 11/05-11/6/23 [id number]  Cognitive Concerns/ Orientation : A&Ox4.  BEHAVIOR & AGGRESSION TOOL COLOR: Green   CIWA SCORE: 0 this shift  ABNL VS/O2: VSS, on RA   MOBILITY: Independent   PAIN MANAGMENT: No c/o pain  DIET: Regular  BOWEL/BLADDER: Continent  ABNL LAB/BG: none on this shift AM labs pending.  DRAIN/DEVICES: R. AC PIV SL  TELEMETRY RHYTHM: NA  SKIN: WDL   TESTS/PROCEDURES: None  D/C DAY/GOALS/PLACE: Possible discharge on 11/6/23 after CD and psych consult.

## 2023-11-06 NOTE — PLAN OF CARE
Discharge    Patient discharged to home, pt used [employer] services.   Care plan note  VSS on Ra, denies chest pain or SOB. Ate well. Denies abdominal pain or nausea. Feels better, states that new med regiment is really helping with his anxiety. Pt feels hopeful. He will follow up with chem dep recommendations and PCP    Listed belongings gathered and given to patient (including from security/pharmacy). Yes  Care Plan and Patient education resolved: Yes  Prescriptions if needed, hard copies sent with patient  NA  Medication Bin checked and emptied on discharge Yes  SW/care coordinator/charge RN aware of discharge: Yes

## 2023-11-08 ENCOUNTER — PATIENT OUTREACH (OUTPATIENT)
Dept: CARE COORDINATION | Facility: CLINIC | Age: 33
End: 2023-11-08
Payer: COMMERCIAL

## 2023-11-08 NOTE — PROGRESS NOTES
Connected Care Resource Center Contact  [facility]/Voicemail     Clinical Data: Post-Discharge Outreach     Outreach attempted x 2.  Left message on patient's voicemail, providing [facility]'s central phone number of [phone] ([phone]) for questions/concerns and/or to schedule an appt with an [facility] provider, if they do not have a PCP.      Plan:  [facility] will do no further outreaches at this time.       LENORE Salazar  Connected Care Resource Center, [facility]    *Connected Care Resource Team does NOT follow patient ongoing. Referrals are identified based on internal discharge reports and the outreach is to ensure patient has an understanding of their discharge instructions.

## 2023-11-09 ENCOUNTER — TELEPHONE (OUTPATIENT)
Dept: BEHAVIORAL HEALTH | Facility: CLINIC | Age: 33
End: 2023-11-09

## 2023-11-09 ENCOUNTER — HOSPITAL ENCOUNTER (INPATIENT)
Facility: CLINIC | Age: 33
LOS: 2 days | Discharge: HOME | DRG: 896 | End: 2023-11-11
Attending: PSYCHIATRY & NEUROLOGY | Admitting: PSYCHIATRY & NEUROLOGY
Payer: COMMERCIAL

## 2023-11-09 ENCOUNTER — HOSPITAL ENCOUNTER (EMERGENCY)
Facility: CLINIC | Age: 33
Discharge: ANOTHER HOSPITAL | End: 2023-11-09
Attending: EMERGENCY MEDICINE | Admitting: EMERGENCY MEDICINE
Payer: COMMERCIAL

## 2023-11-09 VITALS
RESPIRATION RATE: 16 BRPM | SYSTOLIC BLOOD PRESSURE: 132 MMHG | OXYGEN SATURATION: 95 % | TEMPERATURE: 98 F | DIASTOLIC BLOOD PRESSURE: 84 MMHG | HEART RATE: 66 BPM

## 2023-11-09 DIAGNOSIS — F10.230 ALCOHOL DEPENDENCE WITH WITHDRAWAL, UNCOMPLICATED (H): ICD-10-CM

## 2023-11-09 DIAGNOSIS — F10.920 ALCOHOLIC INTOXICATION WITHOUT COMPLICATION (H): ICD-10-CM

## 2023-11-09 DIAGNOSIS — F33.41 DEPRESSION, MAJOR, RECURRENT, IN PARTIAL REMISSION (H): ICD-10-CM

## 2023-11-09 DIAGNOSIS — K29.20 CHRONIC ALCOHOLIC GASTRITIS, PRESENCE OF BLEEDING UNSPECIFIED: ICD-10-CM

## 2023-11-09 DIAGNOSIS — F10.939 ALCOHOL WITHDRAWAL, WITH UNSPECIFIED COMPLICATION (H): Primary | ICD-10-CM

## 2023-11-09 DIAGNOSIS — Z87.898 HISTORY OF SEIZURE DUE TO ALCOHOL WITHDRAWAL: ICD-10-CM

## 2023-11-09 DIAGNOSIS — Z86.59 HISTORY OF SEIZURE DUE TO ALCOHOL WITHDRAWAL: ICD-10-CM

## 2023-11-09 DIAGNOSIS — F17.200 NICOTINE DEPENDENCE WITH CURRENT USE: ICD-10-CM

## 2023-11-09 LAB
ABO/RH(D): NORMAL
ALBUMIN SERPL BCG-MCNC: 4.4 G/DL (ref 3.5–5.2)
ALP SERPL-CCNC: 78 U/L (ref 40–129)
ALT SERPL W P-5'-P-CCNC: 16 U/L (ref 0–70)
AMPHETAMINES UR QL SCN: ABNORMAL
ANION GAP SERPL CALCULATED.3IONS-SCNC: 20 MMOL/L (ref 7–15)
ANTIBODY SCREEN: NEGATIVE
AST SERPL W P-5'-P-CCNC: 22 U/L (ref 0–45)
BARBITURATES UR QL SCN: ABNORMAL
BASOPHILS # BLD AUTO: 0.1 10E3/UL (ref 0–0.2)
BASOPHILS NFR BLD AUTO: 1 %
BENZODIAZ UR QL SCN: ABNORMAL
BILIRUB SERPL-MCNC: 0.2 MG/DL
BUN SERPL-MCNC: 12.8 MG/DL (ref 6–20)
BZE UR QL SCN: ABNORMAL
CALCIUM SERPL-MCNC: 8.7 MG/DL (ref 8.6–10)
CANNABINOIDS UR QL SCN: ABNORMAL
CHLORIDE SERPL-SCNC: 105 MMOL/L (ref 98–107)
CREAT SERPL-MCNC: 0.69 MG/DL (ref 0.67–1.17)
DEPRECATED HCO3 PLAS-SCNC: 19 MMOL/L (ref 22–29)
EGFRCR SERPLBLD CKD-EPI 2021: >90 ML/MIN/1.73M2
EOSINOPHIL # BLD AUTO: 0 10E3/UL (ref 0–0.7)
EOSINOPHIL NFR BLD AUTO: 0 %
ERYTHROCYTE [DISTWIDTH] IN BLOOD BY AUTOMATED COUNT: 13.2 % (ref 10–15)
ETHANOL SERPL-MCNC: 0.29 G/DL
FENTANYL UR QL: ABNORMAL
GLUCOSE SERPL-MCNC: 84 MG/DL (ref 70–99)
HCT VFR BLD AUTO: 43.4 % (ref 40–53)
HGB BLD-MCNC: 14.7 G/DL (ref 13.3–17.7)
IMM GRANULOCYTES # BLD: 0 10E3/UL
IMM GRANULOCYTES NFR BLD: 0 %
LYMPHOCYTES # BLD AUTO: 1.1 10E3/UL (ref 0.8–5.3)
LYMPHOCYTES NFR BLD AUTO: 14 %
MCH RBC QN AUTO: 29.5 PG (ref 26.5–33)
MCHC RBC AUTO-ENTMCNC: 33.9 G/DL (ref 31.5–36.5)
MCV RBC AUTO: 87 FL (ref 78–100)
MONOCYTES # BLD AUTO: 0.2 10E3/UL (ref 0–1.3)
MONOCYTES NFR BLD AUTO: 2 %
NEUTROPHILS # BLD AUTO: 6.5 10E3/UL (ref 1.6–8.3)
NEUTROPHILS NFR BLD AUTO: 83 %
NRBC # BLD AUTO: 0 10E3/UL
NRBC BLD AUTO-RTO: 0 /100
OPIATES UR QL SCN: ABNORMAL
PCP QUAL URINE (ROCHE): ABNORMAL
PLATELET # BLD AUTO: 344 10E3/UL (ref 150–450)
POTASSIUM SERPL-SCNC: 4 MMOL/L (ref 3.4–5.3)
PROT SERPL-MCNC: 7.6 G/DL (ref 6.4–8.3)
RBC # BLD AUTO: 4.98 10E6/UL (ref 4.4–5.9)
SODIUM SERPL-SCNC: 144 MMOL/L (ref 135–145)
SPECIMEN EXPIRATION DATE: NORMAL
WBC # BLD AUTO: 7.9 10E3/UL (ref 4–11)

## 2023-11-09 PROCEDURE — 250N000011 HC RX IP 250 OP 636: Performed by: EMERGENCY MEDICINE

## 2023-11-09 PROCEDURE — 250N000011 HC RX IP 250 OP 636: Performed by: PSYCHIATRY & NEUROLOGY

## 2023-11-09 PROCEDURE — 82077 ASSAY SPEC XCP UR&BREATH IA: CPT | Performed by: EMERGENCY MEDICINE

## 2023-11-09 PROCEDURE — 250N000013 HC RX MED GY IP 250 OP 250 PS 637: Performed by: EMERGENCY MEDICINE

## 2023-11-09 PROCEDURE — 96375 TX/PRO/DX INJ NEW DRUG ADDON: CPT

## 2023-11-09 PROCEDURE — 85025 COMPLETE CBC W/AUTO DIFF WBC: CPT | Performed by: EMERGENCY MEDICINE

## 2023-11-09 PROCEDURE — 258N000003 HC RX IP 258 OP 636: Performed by: EMERGENCY MEDICINE

## 2023-11-09 PROCEDURE — 86901 BLOOD TYPING SEROLOGIC RH(D): CPT | Performed by: EMERGENCY MEDICINE

## 2023-11-09 PROCEDURE — C9113 INJ PANTOPRAZOLE SODIUM, VIA: HCPCS | Mod: JZ | Performed by: EMERGENCY MEDICINE

## 2023-11-09 PROCEDURE — 128N000004 HC R&B CD ADULT

## 2023-11-09 PROCEDURE — 80053 COMPREHEN METABOLIC PANEL: CPT | Performed by: EMERGENCY MEDICINE

## 2023-11-09 PROCEDURE — 80307 DRUG TEST PRSMV CHEM ANLYZR: CPT | Performed by: EMERGENCY MEDICINE

## 2023-11-09 PROCEDURE — 250N000009 HC RX 250: Performed by: EMERGENCY MEDICINE

## 2023-11-09 PROCEDURE — 250N000013 HC RX MED GY IP 250 OP 250 PS 637: Performed by: PSYCHIATRY & NEUROLOGY

## 2023-11-09 PROCEDURE — 99285 EMERGENCY DEPT VISIT HI MDM: CPT | Mod: 25

## 2023-11-09 PROCEDURE — 96361 HYDRATE IV INFUSION ADD-ON: CPT

## 2023-11-09 PROCEDURE — 86850 RBC ANTIBODY SCREEN: CPT | Performed by: EMERGENCY MEDICINE

## 2023-11-09 PROCEDURE — 96374 THER/PROPH/DIAG INJ IV PUSH: CPT

## 2023-11-09 PROCEDURE — 36415 COLL VENOUS BLD VENIPUNCTURE: CPT | Performed by: EMERGENCY MEDICINE

## 2023-11-09 RX ORDER — LOPERAMIDE HCL 2 MG
2 CAPSULE ORAL 4 TIMES DAILY PRN
Status: DISCONTINUED | OUTPATIENT
Start: 2023-11-09 | End: 2023-11-11 | Stop reason: HOSPADM

## 2023-11-09 RX ORDER — FOLIC ACID 1 MG/1
1 TABLET ORAL DAILY
Status: DISCONTINUED | OUTPATIENT
Start: 2023-11-09 | End: 2023-11-09 | Stop reason: HOSPADM

## 2023-11-09 RX ORDER — ATENOLOL 50 MG/1
50 TABLET ORAL DAILY PRN
Status: DISCONTINUED | OUTPATIENT
Start: 2023-11-09 | End: 2023-11-11 | Stop reason: HOSPADM

## 2023-11-09 RX ORDER — TRAZODONE HYDROCHLORIDE 50 MG/1
50 TABLET, FILM COATED ORAL
Status: DISCONTINUED | OUTPATIENT
Start: 2023-11-09 | End: 2023-11-10

## 2023-11-09 RX ORDER — MAGNESIUM HYDROXIDE/ALUMINUM HYDROXICE/SIMETHICONE 120; 1200; 1200 MG/30ML; MG/30ML; MG/30ML
30 SUSPENSION ORAL EVERY 4 HOURS PRN
Status: DISCONTINUED | OUTPATIENT
Start: 2023-11-09 | End: 2023-11-11 | Stop reason: HOSPADM

## 2023-11-09 RX ORDER — ACETAMINOPHEN 325 MG/1
650 TABLET ORAL EVERY 4 HOURS PRN
Status: DISCONTINUED | OUTPATIENT
Start: 2023-11-09 | End: 2023-11-11 | Stop reason: HOSPADM

## 2023-11-09 RX ORDER — PANTOPRAZOLE SODIUM 40 MG/1
40 TABLET, DELAYED RELEASE ORAL 2 TIMES DAILY
Status: ON HOLD | COMMUNITY
End: 2023-11-11

## 2023-11-09 RX ORDER — ONDANSETRON 2 MG/ML
4 INJECTION INTRAMUSCULAR; INTRAVENOUS ONCE
Status: COMPLETED | OUTPATIENT
Start: 2023-11-09 | End: 2023-11-09

## 2023-11-09 RX ORDER — FOLIC ACID 1 MG/1
1 TABLET ORAL DAILY
Status: DISCONTINUED | OUTPATIENT
Start: 2023-11-10 | End: 2023-11-11 | Stop reason: HOSPADM

## 2023-11-09 RX ORDER — DIAZEPAM 5 MG
5-20 TABLET ORAL EVERY 30 MIN PRN
Status: DISCONTINUED | OUTPATIENT
Start: 2023-11-09 | End: 2023-11-11 | Stop reason: HOSPADM

## 2023-11-09 RX ORDER — CLONIDINE HYDROCHLORIDE 0.1 MG/1
0.1 TABLET ORAL EVERY 8 HOURS
Status: DISCONTINUED | OUTPATIENT
Start: 2023-11-09 | End: 2023-11-09 | Stop reason: HOSPADM

## 2023-11-09 RX ORDER — MULTIPLE VITAMINS W/ MINERALS TAB 9MG-400MCG
1 TAB ORAL DAILY
Status: DISCONTINUED | OUTPATIENT
Start: 2023-11-09 | End: 2023-11-09 | Stop reason: HOSPADM

## 2023-11-09 RX ORDER — ONDANSETRON 4 MG/1
4 TABLET, ORALLY DISINTEGRATING ORAL EVERY 6 HOURS PRN
Status: DISCONTINUED | OUTPATIENT
Start: 2023-11-09 | End: 2023-11-11 | Stop reason: HOSPADM

## 2023-11-09 RX ORDER — AMOXICILLIN 250 MG
1 CAPSULE ORAL 2 TIMES DAILY PRN
Status: DISCONTINUED | OUTPATIENT
Start: 2023-11-09 | End: 2023-11-11 | Stop reason: HOSPADM

## 2023-11-09 RX ORDER — MULTIPLE VITAMINS W/ MINERALS TAB 9MG-400MCG
1 TAB ORAL DAILY
Status: DISCONTINUED | OUTPATIENT
Start: 2023-11-10 | End: 2023-11-11 | Stop reason: HOSPADM

## 2023-11-09 RX ORDER — DIAZEPAM 10 MG/2ML
5-10 INJECTION, SOLUTION INTRAMUSCULAR; INTRAVENOUS EVERY 30 MIN PRN
Status: DISCONTINUED | OUTPATIENT
Start: 2023-11-09 | End: 2023-11-09 | Stop reason: HOSPADM

## 2023-11-09 RX ORDER — HYDROXYZINE HYDROCHLORIDE 25 MG/1
25 TABLET, FILM COATED ORAL EVERY 4 HOURS PRN
Status: DISCONTINUED | OUTPATIENT
Start: 2023-11-09 | End: 2023-11-10

## 2023-11-09 RX ORDER — FLUMAZENIL 0.1 MG/ML
0.2 INJECTION, SOLUTION INTRAVENOUS
Status: DISCONTINUED | OUTPATIENT
Start: 2023-11-09 | End: 2023-11-09 | Stop reason: HOSPADM

## 2023-11-09 RX ORDER — DIAZEPAM 5 MG
10 TABLET ORAL EVERY 30 MIN PRN
Status: DISCONTINUED | OUTPATIENT
Start: 2023-11-09 | End: 2023-11-09 | Stop reason: HOSPADM

## 2023-11-09 RX ADMIN — TRAZODONE HYDROCHLORIDE 50 MG: 50 TABLET ORAL at 20:48

## 2023-11-09 RX ADMIN — THIAMINE HCL TAB 100 MG 100 MG: 100 TAB at 14:36

## 2023-11-09 RX ADMIN — PANTOPRAZOLE SODIUM 40 MG: 40 INJECTION, POWDER, FOR SOLUTION INTRAVENOUS at 09:45

## 2023-11-09 RX ADMIN — ONDANSETRON 4 MG: 2 INJECTION INTRAMUSCULAR; INTRAVENOUS at 09:45

## 2023-11-09 RX ADMIN — DIAZEPAM 10 MG: 5 TABLET ORAL at 18:34

## 2023-11-09 RX ADMIN — MULTIPLE VITAMINS W/ MINERALS TAB 1 TABLET: TAB at 14:36

## 2023-11-09 RX ADMIN — FOLIC ACID 1 MG: 1 TABLET ORAL at 14:36

## 2023-11-09 RX ADMIN — SODIUM CHLORIDE 1000 ML: 9 INJECTION, SOLUTION INTRAVENOUS at 09:37

## 2023-11-09 RX ADMIN — DIAZEPAM 10 MG: 10 INJECTION, SOLUTION INTRAMUSCULAR; INTRAVENOUS at 14:44

## 2023-11-09 RX ADMIN — DIAZEPAM 10 MG: 5 TABLET ORAL at 20:46

## 2023-11-09 RX ADMIN — FLUMAZENIL 0.2 MG: 0.1 INJECTION INTRAVENOUS at 14:49

## 2023-11-09 RX ADMIN — ONDANSETRON 4 MG: 4 TABLET, ORALLY DISINTEGRATING ORAL at 18:35

## 2023-11-09 ASSESSMENT — ACTIVITIES OF DAILY LIVING (ADL)
ADLS_ACUITY_SCORE: 35
ADLS_ACUITY_SCORE: 28
HYGIENE/GROOMING: INDEPENDENT
ADLS_ACUITY_SCORE: 35
ADLS_ACUITY_SCORE: 28
ADLS_ACUITY_SCORE: 28
ADLS_ACUITY_SCORE: 35

## 2023-11-09 ASSESSMENT — LIFESTYLE VARIABLES: SKIP TO QUESTIONS 9-10: 0

## 2023-11-09 NOTE — CONSULTS
Diagnostic Evaluation Consultation  Crisis Assessment    Patient Name: Chuck Magdaleno  Age:  33 year old  Legal Sex: male  Gender Identity: male  Pronouns:   Race: White  Ethnicity: Not  or   Language: English    Patient was assessed: Virtual: [facility] Crisis Assessment Start Time: 1132 Crisis Assessment Stop Time: 1219  Patient location: [facility] EMERGENCY DEPT                             ED13    Referral Data and Chief Complaint  Chuck Magdaleno presents to the ED via EMS. Patient is presenting to the ED for the following concerns: Suicidal ideation, Intoxication, Substance use.   Factors that make the mental health crisis life threatening or complex are:  Pt states he is dying of alcoholism. Pt has a hx of drinking and presenting to ED for assistance with withdrawal. Pt reports he is waiting to get into DOMENIC treatment at [facility] and has been calling often regarding this. Pt states he still does not have a start date and is needing assistance with withdrawal at this time and would like detox services as he has a hx of seizures when withdrawing. Pt denied any active, current thoughts intent or plans to harm himself. Pt denied any HI or NSSI. Pt was irritable during the assessment and states it is because he doesn't feel well.    Informed Consent and Assessment Methods  Explained the crisis assessment process, including applicable information disclosures and limits to confidentiality, assessed understanding of the process, and obtained consent to proceed with the assessment.  Assessment methods included conducting a formal interview with patient, review of medical records, collaboration with medical staff, and obtaining relevant collateral information from family and community providers when available.  : done     Patient response to interventions: eager to participate, verbalizes understanding  Coping skills were attempted to reduce the crisis:  Medication management,  "MANPREET, Big Book reading, sponsors.     History of the Crisis   Pt has had 10 previous ED visits since January for alcohol intoxication and withdrawal. Pt was first diagnosed with depression and anxiety in 2008. Pt takes 150 mg sertraline, 25mg Vistaril as needed. Pt states he only takes the medication that works for him, so is only taking sertraline currently. Pt reports attending at least 13 previous DOMENIC programs, most recently at [facility] 2 weeks ago. Pt stayed for a couple days and left.    Brief Psychosocial History  Family:  Single, Children no  Support System:  None  Employment Status:  unemployed (Fired from  and [employer] after having a seizure at work when he tried to stop drinking.)  Source of Income:  none  Financial Environmental Concerns:  unemployed  Current Hobbies:  exercise/fitness, outdoor activities, group/social activities, sports/team sports, television/movies/videos, music, other (see comments), travel (Hockey, soccer, golf, [place], physical activity)  Barriers in Personal Life:  lack of motivation, other (see comments) (Drinking)    Significant Clinical History  Current Anxiety Symptoms:  anxious, excessive worry, racing thoughts  Current Depression/Trauma:  low self esteem, impaired decision making, sense of doom, difficulty concentrating, irritable, negativistic, hopelessness, helplessness, sadness, excessive guilt, withdrawl/isolation, thoughts of death/suicide  Current Somatic Symptoms:  racing thoughts, excessive worry, anxious  Current Psychosis/Thought Disturbance:   (\"Alcohol turns me into someone else, otherwise I am pretty calm cliff\".)  Current Eating Symptoms:  loss of appetite  Chemical Use History:  Alcohol: Blackouts, Daily  Last Use:: 11/08/23  Benzodiazepines: None  Opiates: None  Cocaine: None  Marijuana: Daily  Last Use:: 11/08/23  Other Use: None  Withdrawal Symptoms: Tremors, Seizures, Nausea, Hallucinations  Addictions:  (Pt denies)   Past diagnosis:  " Substance Use Disorder, Anxiety Disorder, Depression  Family history:   (MI runs in pt's family. He is not sure what.)  Past treatment:  AA/NA, Supportive Living Environment (group home, shelter house, etc), Probation/Court ordered treatment, Psychiatric Medication Management  Details of most recent treatment:  Pt states he has only attended DOMENIC programming, AA and med management.  Other relevant history:  3 DWI's 2007, 2012, 2018. No active or current legal issues according to the pt.    Collateral Information  Is there collateral information: No (Pt reports he has contacted all the people he needs to regarding his ED visit and does not want writer to reach out to anyone at this time. He has a strained relationship with his family due to his drinking.)      Risk Assessment  Cole Suicide Severity Rating Scale Full Clinical Version:  Suicidal Ideation  Q1 Wish to be Dead (Lifetime): Yes  Q2 Non-Specific Active Suicidal Thoughts (Lifetime): Yes  3. Active Suicidal Ideation with any Methods (Not Plan) Without Intent to Act (Lifetime): Yes  Q4 Active Suicidal Ideation with Some Intent to Act, Without Specific Plan (Lifetime): No  Q5 Active Suicidal Ideation with Specific Plan and Intent (Lifetime): No  Q6 Suicide Behavior (Lifetime): no     Suicidal Behavior (Lifetime)  Actual Attempt (Lifetime): No  Has subject engaged in non-suicidal self-injurious behavior? (Lifetime): No  Interrupted Attempts (Lifetime): No  Aborted or Self-Interrupted Attempt (Lifetime): No  Preparatory Acts or Behavior (Lifetime): No    Cole Suicide Severity Rating Scale Recent:   Suicidal Ideation (Recent)  Q1 Wished to be Dead (Past Month): yes  Q2 Suicidal Thoughts (Past Month): yes  Q3 Suicidal Thought Method: yes  Q4 Suicidal Intent without Specific Plan: no  Q5 Suicide Intent with Specific Plan: no  Level of Risk per Screen: moderate risk  Intensity of Ideation (Recent)  Most Severe Ideation Rating (Past 1 Month): 5  Frequency (Past  1 Month): Daily or almost daily  Duration (Past 1 Month): Fleeting, few seconds or minutes  Controllability (Past 1 Month): Can control thoughts with little difficulty  Deterrents (Past 1 Month): Does not apply  Reasons for Ideation (Past 1 Month): Does not apply  Suicidal Behavior (Recent)  Actual Attempt (Past 3 Months): No  Total Number of Actual Attempts (Past 3 Months): 0  Has subject engaged in non-suicidal self-injurious behavior? (Past 3 Months): No  Interrupted Attempts (Past 3 Months): No  Total Number of Interrupted Attempts (Past 3 Months): 0  Aborted or Self-Interrupted Attempt (Past 3 Months): No  Total Number of Aborted or Self-Interrupted Attempts (Past 3 Months): 0  Preparatory Acts or Behavior (Past 3 Months): No  Total Number of Preparatory Acts (Past 3 Months): 0    Environmental or Psychosocial Events: legal issues such as DWI, DUI, lawsuit, CPS involvement, etc., challenging interpersonal relationships, other life stressors, unstable housing, homelessness, social isolation, ongoing abuse of substances, helplessness/hopelessness, impulsivity/recklessness, unemployment/underemployment, history of TBI  Protective Factors: Protective Factors: help seeking, cultural, spiritual , or Druze beliefs associated with meaning and value in life    Does the patient have thoughts of harming others? Feels Like Hurting Others: no  Previous Attempt to Hurt Others: no  Current presentation: Irritable  Violence Threats in Past 6 Months: Pt denied  Current Violence Plan or Thoughts: Pt denied  Is the patient engaging in sexually inappropriate behavior?: no  Duty to warn initiated: no  Duty to warn details: No    Is the patient engaging in sexually inappropriate behavior?  no        Mental Status Exam   Affect: Other (Irritable)  Appearance: Disheveled  Attention Span/Concentration: Attentive  Eye Contact: Variable    Fund of Knowledge: Appropriate   Language /Speech Content: Fluent  Language /Speech Volume:  Normal  Language /Speech Rate/Productions: Normal  Recent Memory: Intact  Remote Memory: Intact  Mood: Irritable  Orientation to Person: Yes   Orientation to Place: Yes  Orientation to Time of Day: Yes  Orientation to Date: Yes     Situation (Do they understand why they are here?): Yes  Psychomotor Behavior: Agitated  Thought Content: Clear  Thought Form: Intact      Medication  Psychotropic medications: Sertraline     Current Care Team  Patient Care Team:  No Ref-Primary, Physician as PCP - General    Diagnosis  Patient Active Problem List   Diagnosis Code    Alcohol dependence with withdrawal, uncomplicated (H) F10.230    Alcohol withdrawal syndrome without complication (H) F10.930    Anxiety F41.9    Attention deficit hyperactivity disorder (ADHD), combined type F90.2    Controlled substance agreement signed Z79.899    Depression, major, recurrent, in partial remission (H24) F33.41    Marihuana dependence (H) F12.20    Alcohol dependence with withdrawal with complication (H) F10.239    Alcohol withdrawal, uncomplicated (H) F10.930    History of seizure due to alcohol withdrawal Z87.898, Z86.59    Alcohol intoxication, with unspecified complication (H24) F10.929    Alcoholic ketosis (H) E88.89    Alcohol withdrawal seizure with complication (H) F10.939, R56.9     Primary Problem This Admission  Active Hospital Problems  F10.239  Alcohol dependence with withdrawal with complication (H)    F41.9  Anxiety    Clinical Summary and Substantiation of Recommendations   After therapeutic assessment, intervention and aftercare planning by ED care team and LM and in consultation with attending provider, the patient's circumstances and mental state were appropriate for detox and then outpatient MI/DOMENIC management. It is the recommendation of this clinician that pt attend detox and the follow up with with OP MI/DOMENIC support. At this time the pt is not presenting as an acute risk to self or others due to the following factors:  Pt denied any active SI/HI/NSSI. Pt reports his comments were made about ending things because of his current physical state and withdrawal symptoms. Pt reports he is safe, feels he can stay safe and has no thoughts of harming himself or others. Pt developed a safety plan and reorts having a large sobery support.    Patient coping skills attempted to reduce the crisis:  Medication management, AA, Big Book reading, sponsors.    Disposition  Recommended disposition: Detox, Substance Abuse Disorder Treatment, Medication Management        Reviewed case and recommendations with attending provider. Attending Name: Dr Saleem       Attending concurs with disposition: yes       Patient and/or validated legal guardian concurs with disposition:   yes       Final disposition:   (Detox followed by placement at [facility])    Legal status on admission: Voluntary/Patient has signed consent for treatment    Assessment Details   Total duration spent with the patient: 47 min     CPT code(s) utilized: 07149 - Psychotherapy for Crisis - 60 (30-74*) min    Silvia Samaniego [facility], Psychotherapist  [facility] - Triage & Transition Services  Callback: [phone]

## 2023-11-09 NOTE — ED TRIAGE NOTES
"PT arrives via EMS, EMS reports that pt was recently hospitalized due to ETOH dependency about 1 wk ago. PT has been drinking per [facility] for the last wk and living in the car, pt called EMS due to having an episode of \"coffee ground emesis\" this am parked at rest stop. EMS gave 4 mg of zofran en route and 250 ml of NS. Pt reports still having nausea and reports worsening abdominal pain with emesis. Pt VSS and ABC's intact        "

## 2023-11-09 NOTE — ED NOTES
Bed: ED13  Expected date: 11/9/23  Expected time: 9:04 AM  Means of arrival:   Comments:  TONY pt

## 2023-11-09 NOTE — TELEPHONE ENCOUNTER
"S: [facility] ED , Provider Jennifer calling at 12:23 pm  with clinical on a 33 year old/Male presenting for alcohol detox.     B: Pt presents for ETOH detox.   Currently reports drinking daily, \"large amounts\",  for \"a very long time\".  Specifics unk. Per chart review, pt was hospitalized at [facility] 11/4/23 and d/c'ed 11/6/23.   Patient reports last use was yesterday.  Pt ROGER: .29  Pt  endorses hx of DT's, unk when   Pt  endorses hx of seizures. Last seizure:  unk  Pt endorsing the following symptoms of withdrawal:  shaking, irritable, feels parched, tingling all over skin   MSSA Score: author requested she ask the RN to call back with this; author called ED RN at 1:56 pm. MSSA is still not known. She said she will check the CIWA score and will call back. Per RN at 2:09 pm, his CIWA score is 19.     Pt denies acute mental health or medical concerns. He reports passive SI but no plan or intent and says it actually is anger toward his current situation. He reports anxiety and dep.   Pt endorses other drug use: Cannabis Amount/frequency:  daily, amt unk    Does Pt have a detox care plan in [facility]? no  Does pt present with specific needs, assistive devices, or exclusionary criteria? None  Is the patient ambulating, eating and drinking in the ED? Yes; eating ice chips; author asked her to have the RN call back re: whether he is eating; he is drinking; author called ED RN at 1:56 pm. She said she will give him some food and will call back. Per RN at 2:09 pm, pt ate half a sandwich and has been drinking water.     A: Pt meets criteria to be presented for IP detox admission. Patient is voluntary    COVID Symptoms: No  If yes, COVID test required   Utox: Not ordered, intake to request lab requested  CMP: Abnormalities: CO2 19, anion gap 20  CBC: WNL  HCG: N/A     R: Patient cleared and ready for behavioral bed placement: No  yes as of 2:09 pm, as pt is now eating and drinking    Pt is meeting criteria for presentation to " 3A/CD    Does Patient need a Transfer Center request created? Yes, writer completed Transfer Center request at: 12:34 pm     Paged Moses at 2:06 pm saying pt is still not med cleared but asking if he thinks pt needs detox as he was hospitalized for etoh withdrawal 11/4-11/6.     3a west/straight cd per Moses/[facility] accepted for himself; unit notified at 2:30 pm/left msg asking for CRN to call back; ED notified at 2:30 pm and author asked ED staff to ask ED MD to write admit orders

## 2023-11-09 NOTE — DISCHARGE INSTRUCTIONS
"Aftercare Plan  If I am feeling unsafe or I am in a crisis, I will:   Contact my established care providers   Call the National Suicide Prevention Lifeline: 988  Go to the nearest emergency room   Call 911     Warning signs that I or other people might notice when a crisis is developing for me:   Drinking, \"I am an alcoholic\".  Depression  Anxiety, mind not turning off, so drinking to turn it off.    Things I am able to do on my own to cope or help me feel better:   Reading  Writing  Exercise     Things that I am able to do with others to cope or help me better:   Secure a new sponsor  Building a sober network again    Things I can use or do for distraction:   Go to a meeting  Call someone  Play the tape forward     Changes I can make to support my mental health and wellness:   Abstain from alcohol  Take medications as prescribed  Attend MI/DOMENIC treatment     People in my life that I can ask for help:   Sudha Sanchez     [facility] has a mental health crisis team you can call 24/7: [facility] Mobile Crisis  [phone]     Crisis Lines  Crisis Text Line  Text 616880  You will be connected with a trained live crisis counselor to provide support.  Por espanol, texto  LELIA a 445591 o texto a 442-AYUDAME en WhatsApp  The [employer] (LGBTQ Youth Crisis Line)  6.547.595.6174  text START to 342-442    Community Resources  Fast Tracker  Linking people to mental health and substance use disorder resources  fasttrackNext Performancen.org   [place] Mental [facility] Warm Line  Peer to peer support  Monday thru Saturday, 12 pm to 10 pm  166.521.3741 or 8.741.600.4174  Text \"Support\" to 43743  [employer] [place] [employer] Mental Illness (FELIX)  700.597.0123 or 1.888.FELIX.HELPS    Mental Health Apps  My3  https://my3app.org/  VirtualHopeBox  https://[employer].org/apps/virtual-hope-box/    Additional Information  Today you were seen by a licensed mental health professional through Triage and Transition services, Behavioral " Healthcare Providers ([facility])  for a crisis assessment in the Emergency Department at [facility].  It is recommended that you follow up with your established providers (psychiatrist, mental health therapist, and/or primary care doctor - as relevant) as soon as possible. Coordinators from [facility] will be calling you in the next 24-48 hours to ensure that you have the resources you need.  You can also contact [facility] coordinators directly at [phone]. You may have been scheduled for or offered an appointment with a mental health provider. [facility] maintains an extensive network of licensed behavioral health providers to connect patients with the services they need.  We do not charge providers a fee to participate in our referral network.  We match patients with providers based on a patient's specific needs, insurance coverage, and location.  Our first effort will be to refer you to a provider within your care system, and will utilize providers outside your care system as needed.

## 2023-11-09 NOTE — ED NOTES
"Writer went into patient room to administer ordered medications. Pt told writer \"I would like to cash in my ticket\" and that he was \"wishing he did not wake up instead of ending up in the hospital\". Writer notified MD (Harper) about patient's statements and said he will assess the patient, no hold, and no sitter.   "

## 2023-11-09 NOTE — ED NOTES
"Patient expressed suicidal ideation to RN, stating he \"drank 3L and was pissed that he woke up.\" Patient states he has \"not wanted to be here\" since he was 26.  "

## 2023-11-09 NOTE — ED NOTES
Spoke with Concepcion at [facility] intake about detox placement. She requested a urine drug screen, CIWA score and wanted to know if the patient was able to drink/eat. Writer provided her with the relevant information and Concepcion informed that they would let us know what the plan was.

## 2023-11-09 NOTE — ED NOTES
Bed: SUSANNALOVE  Expected date: 11/9/23  Expected time: 8:34 AM  Means of arrival:   Comments:  A594 to a hallway

## 2023-11-09 NOTE — ED PROVIDER NOTES
"  History     Chief Complaint:  Hematemesis and Alcohol Problem       The history is provided by the patient.      Chuck Magdaleno is a 33 year old male with a history of alcohol dependence and withdrawal seizures who presents with alcohol intoxication, hematemesis, abdominal pain, and suicidal ideation. He was hospitalized for alcohol dependence one week ago. Patient states \"It'd be easier for me and the medical system if I just didn't wake up,\" and says he drank until he thought he might not wake up. He reports he was sober February-July of this year, and has been drinking daily since then. He attempted to enter detox at [facility] in [place], but did not receive a call back.     Independent Historian:   None - Patient Only    Review of External Notes:        Medications:    Buspar  Gabapentin   Vistaril  Keppra  Protonix  Zoloft  Trazodone     Past Medical History:    Alcohol dependence with withdrawal  Alcohol withdrawal seizure  Anxiety   ADHD  Depression   Marijuana dependence    Past Surgical History:    Appendectomy      Physical Exam   Patient Vitals for the past 24 hrs:   BP Temp Temp src Pulse Resp SpO2   11/09/23 1407 125/89 -- -- 69 -- 96 %   11/09/23 0913 121/88 -- -- 94 -- --   11/09/23 0901 (!) 130/101 98  F (36.7  C) Temporal 106 22 96 %        Physical Exam  Gen: well appearing, in no acute distress  Oral : Mucous membranes moist,   Nose: No rhinorhea  Ears: External near normal, without drainage  Eyes: periorbital tissues and sclera normal   Neck: supple, no abnormal swelling  Lungs: Clear bilaterally, no tachypnea or distress, speaks full sentences  CV: Regular rate, regular rhythm  Abd: soft, nondistended, no rebound/guarding, mild diffuse abdominal tenderness   Ext: no lower extremity edema  Skin: warm, dry, well perfused, no rashes/bruising/lesions on exposed skin  Neuro: alert, no gross motor or sensory deficits,   Psych: pleasant mood, normal affect    Emergency " Department Course   Laboratory:  Labs Ordered and Resulted from Time of ED Arrival to Time of ED Departure   COMPREHENSIVE METABOLIC PANEL - Abnormal       Result Value    Sodium 144      Potassium 4.0      Carbon Dioxide (CO2) 19 (*)     Anion Gap 20 (*)     Urea Nitrogen 12.8      Creatinine 0.69      GFR Estimate >90      Calcium 8.7      Chloride 105      Glucose 84      Alkaline Phosphatase 78      AST 22      ALT 16      Protein Total 7.6      Albumin 4.4      Bilirubin Total 0.2     ETHYL ALCOHOL LEVEL - Abnormal    Alcohol ethyl 0.29 (*)    URINE DRUG SCREEN PANEL - Abnormal    Amphetamines Urine Screen Negative      Barbituates Urine Screen Positive (*)     Benzodiazepine Urine Screen Positive (*)     Cannabinoids Urine Screen Positive (*)     Cocaine Urine Screen Negative      Fentanyl Qual Urine Screen Negative      Opiates Urine Screen Negative      PCP Urine Screen Negative     CBC WITH PLATELETS AND DIFFERENTIAL    WBC Count 7.9      RBC Count 4.98      Hemoglobin 14.7      Hematocrit 43.4      MCV 87      MCH 29.5      MCHC 33.9      RDW 13.2      Platelet Count 344      % Neutrophils 83      % Lymphocytes 14      % Monocytes 2      % Eosinophils 0      % Basophils 1      % Immature Granulocytes 0      NRBCs per 100 WBC 0      Absolute Neutrophils 6.5      Absolute Lymphocytes 1.1      Absolute Monocytes 0.2      Absolute Eosinophils 0.0      Absolute Basophils 0.1      Absolute Immature Granulocytes 0.0      Absolute NRBCs 0.0     TYPE AND SCREEN, ADULT    ABO/RH(D) O NEG      Antibody Screen Negative      SPECIMEN EXPIRATION DATE [id number]     ABO/RH TYPE AND SCREEN      Emergency Department Course & Assessments:       Interventions:  Medications   cloNIDine (CATAPRES) tablet 0.1 mg (0 mg Oral Hold 11/9/23 1433)   flumazenil (ROMAZICON) injection 0.2 mg (has no administration in time range)   diazepam (VALIUM) tablet 10 mg (has no administration in time range)     Or   diazepam (VALIUM)  injection 5-10 mg (has no administration in time range)   thiamine (B-1) tablet 100 mg (100 mg Oral $Given 11/9/23 1436)   folic acid (FOLVITE) tablet 1 mg (1 mg Oral $Given 11/9/23 1436)   multivitamin w/minerals (THERA-VIT-M) tablet 1 tablet (1 tablet Oral $Given 11/9/23 1436)   pantoprazole (PROTONIX) IV push injection 40 mg (40 mg Intravenous $Given 11/9/23 0945)   ondansetron (ZOFRAN) injection 4 mg (4 mg Intravenous $Given 11/9/23 0945)   sodium chloride 0.9% BOLUS 1,000 mL (0 mLs Intravenous Stopped 11/9/23 1138)        Independent Interpretation (X-rays, CTs, rhythm strip):  None    Assessments/Consultations/Discussion of Management or Tests:  ED Course as of 11/09/23 1446   Thu Nov 09, 2023   1013 I obtained the history and examined the patient as noted above.    1220 I spoke to Silvia Muse, the patient's DEC , following her evaluation.       Social Determinants of Health affecting care:   None    Disposition:  Care of the patient was transferred to my colleague Dr. Gonzalez pending sober reevaluation.     Impression & Plan    Medical Decision Making:  Patient presents with passive suicidal ideation.  He has not attempted any self-harm behavior.  Began drinking nearly immediately after discharge from his most recent hospitalization.  He has not had any vomiting in the ED he was given some Protonix for presumed gastritis.  His labs are not grossly abnormal, does have history of alcohol withdrawal and seizures.  We will maintain patient on CIWA protocol for now, was seen by the DEC team who agrees he is not acutely suicidal.  Working on placement for detox currently.  We will plan on holding in the ED on a voluntary basis until detox arrangements can be made.      Diagnosis:    ICD-10-CM    1. Alcoholic intoxication without complication (H24)  F10.920            Scribe Disclosure:  Kenzie CISNEROS, am serving as a scribe at 9:04 AM on 11/9/2023 to document services personally performed by Andres Saleem  MD Stuart based on my observations and the provider's statements to me.     11/9/2023   Andres Saleem MD Tschetter, Paul Anthony, MD  11/09/23 1437

## 2023-11-09 NOTE — ED NOTES
ETOH pt; last CIWA 19. MD Saleem notified, orders placed with PRN medication.  diazepam given per MD request, and Flumazenil administered after diazepam. MD Saleem made aware, pharmacy consulted, and patient was assessed. Pt was placed on tele monitor placed and frequent vital signs initiated.

## 2023-11-09 NOTE — PHARMACY-ADMISSION MEDICATION HISTORY
Pharmacist Admission Medication History    Admission medication history is complete. The information provided in this note is only as accurate as the sources available at the time of the update.    Information Source(s): Patient via in-person    Pertinent Information: Pt states has not taken meds for past 3 days. Pt states he never picked up gabapentin rx. Pt states hydroxyzine has not been working for him, so he has been taking buspirone for anxiety only most recently.    Changes made to PTA medication list:  Added: None  Deleted: None  Changed: Keppra daily --> bid, Protonix daily --> BID       Allergies reviewed with patient and updates made in EHR: yes    Medication History Completed By: Ca Kerr [facility] 11/9/2023 2:43 PM    PTA Med List   Medication Sig Last Dose    busPIRone (BUSPAR) 10 MG tablet Take 10 mg by mouth 2 times daily Past Week    cholecalciferol 50 MCG (2000 UT) CAPS Take 50 mcg by mouth daily Past Week at am    folic acid (FOLVITE) 1 MG tablet Take 1 tablet (1 mg) by mouth daily Past Week at am    hydrOXYzine (VISTARIL) 50 MG capsule Take 50 mg by mouth 3 times daily as needed for anxiety prn at has not worked, has been using buspirone instead recently    levETIRAcetam (KEPPRA) 500 MG tablet Take 500 mg by mouth daily Past Week    Multiple Vitamin (TAB-A-TYLER/BETA CAROTENE) TABS Take 1 tablet by mouth daily Past Week at am    nicotine (NICODERM CQ) 21 MG/24HR 24 hr patch Place 1 patch onto the skin every 24 hours Past Week at am    pantoprazole (PROTONIX) 40 MG EC tablet Take 40 mg by mouth 2 times daily Past Week    sertraline (ZOLOFT) 100 MG tablet Take 150 mg by mouth daily Past Week at am    thiamine (B-1) 100 MG tablet Take 1 tablet (100 mg) by mouth daily Past Week at am    traZODone (DESYREL) 100 MG tablet Take 100 mg by mouth nightly as needed for sleep prn at prn

## 2023-11-10 LAB
CHOLEST SERPL-MCNC: 226 MG/DL
GGT SERPL-CCNC: 25 U/L (ref 8–61)
HDLC SERPL-MCNC: 97 MG/DL
LDLC SERPL CALC-MCNC: 107 MG/DL
NONHDLC SERPL-MCNC: 129 MG/DL
TRIGL SERPL-MCNC: 112 MG/DL
TSH SERPL DL<=0.005 MIU/L-ACNC: 0.74 UIU/ML (ref 0.3–4.2)

## 2023-11-10 PROCEDURE — 80061 LIPID PANEL: CPT | Performed by: PSYCHIATRY & NEUROLOGY

## 2023-11-10 PROCEDURE — 82977 ASSAY OF GGT: CPT | Performed by: PSYCHIATRY & NEUROLOGY

## 2023-11-10 PROCEDURE — 128N000004 HC R&B CD ADULT

## 2023-11-10 PROCEDURE — 84443 ASSAY THYROID STIM HORMONE: CPT | Performed by: PSYCHIATRY & NEUROLOGY

## 2023-11-10 PROCEDURE — 99222 1ST HOSP IP/OBS MODERATE 55: CPT

## 2023-11-10 PROCEDURE — 250N000013 HC RX MED GY IP 250 OP 250 PS 637

## 2023-11-10 PROCEDURE — 99222 1ST HOSP IP/OBS MODERATE 55: CPT | Mod: AI | Performed by: PSYCHIATRY & NEUROLOGY

## 2023-11-10 PROCEDURE — 250N000013 HC RX MED GY IP 250 OP 250 PS 637: Performed by: PSYCHIATRY & NEUROLOGY

## 2023-11-10 PROCEDURE — HZ2ZZZZ DETOXIFICATION SERVICES FOR SUBSTANCE ABUSE TREATMENT: ICD-10-PCS | Performed by: PSYCHIATRY & NEUROLOGY

## 2023-11-10 PROCEDURE — 36415 COLL VENOUS BLD VENIPUNCTURE: CPT | Performed by: PSYCHIATRY & NEUROLOGY

## 2023-11-10 RX ORDER — TRAZODONE HYDROCHLORIDE 100 MG/1
100 TABLET ORAL
Status: DISCONTINUED | OUTPATIENT
Start: 2023-11-10 | End: 2023-11-11 | Stop reason: HOSPADM

## 2023-11-10 RX ORDER — LEVETIRACETAM 500 MG/1
500 TABLET ORAL DAILY
Status: DISCONTINUED | OUTPATIENT
Start: 2023-11-10 | End: 2023-11-11 | Stop reason: HOSPADM

## 2023-11-10 RX ORDER — PANTOPRAZOLE SODIUM 40 MG/1
40 TABLET, DELAYED RELEASE ORAL
Status: DISCONTINUED | OUTPATIENT
Start: 2023-11-11 | End: 2023-11-11 | Stop reason: HOSPADM

## 2023-11-10 RX ORDER — NICOTINE 21 MG/24HR
1 PATCH, TRANSDERMAL 24 HOURS TRANSDERMAL DAILY
Status: DISCONTINUED | OUTPATIENT
Start: 2023-11-10 | End: 2023-11-11 | Stop reason: HOSPADM

## 2023-11-10 RX ORDER — HYDROXYZINE HYDROCHLORIDE 50 MG/1
50 TABLET, FILM COATED ORAL 3 TIMES DAILY PRN
Status: DISCONTINUED | OUTPATIENT
Start: 2023-11-10 | End: 2023-11-11 | Stop reason: HOSPADM

## 2023-11-10 RX ORDER — PANTOPRAZOLE SODIUM 40 MG/1
40 TABLET, DELAYED RELEASE ORAL 2 TIMES DAILY
Status: DISCONTINUED | OUTPATIENT
Start: 2023-11-10 | End: 2023-11-10

## 2023-11-10 RX ADMIN — HYDROXYZINE HYDROCHLORIDE 50 MG: 50 TABLET, FILM COATED ORAL at 16:23

## 2023-11-10 RX ADMIN — LEVETIRACETAM 500 MG: 500 TABLET, FILM COATED ORAL at 08:50

## 2023-11-10 RX ADMIN — PANTOPRAZOLE SODIUM 40 MG: 40 TABLET, DELAYED RELEASE ORAL at 08:50

## 2023-11-10 RX ADMIN — FOLIC ACID 1 MG: 1 TABLET ORAL at 08:50

## 2023-11-10 RX ADMIN — ALUMINUM HYDROXIDE, MAGNESIUM HYDROXIDE, AND SIMETHICONE 30 ML: 200; 200; 20 SUSPENSION ORAL at 09:00

## 2023-11-10 RX ADMIN — DIAZEPAM 10 MG: 5 TABLET ORAL at 00:47

## 2023-11-10 RX ADMIN — NICOTINE 1 PATCH: 21 PATCH, EXTENDED RELEASE TRANSDERMAL at 10:37

## 2023-11-10 RX ADMIN — MULTIPLE VITAMINS W/ MINERALS TAB 1 TABLET: TAB at 08:50

## 2023-11-10 RX ADMIN — NICOTINE POLACRILEX 2 MG: 2 GUM, CHEWING BUCCAL at 10:37

## 2023-11-10 RX ADMIN — THIAMINE HCL TAB 100 MG 100 MG: 100 TAB at 08:50

## 2023-11-10 RX ADMIN — DIAZEPAM 10 MG: 5 TABLET ORAL at 20:26

## 2023-11-10 RX ADMIN — TRAZODONE HYDROCHLORIDE 100 MG: 100 TABLET ORAL at 20:43

## 2023-11-10 RX ADMIN — DIAZEPAM 10 MG: 5 TABLET ORAL at 16:23

## 2023-11-10 RX ADMIN — DIAZEPAM 5 MG: 5 TABLET ORAL at 08:50

## 2023-11-10 ASSESSMENT — ACTIVITIES OF DAILY LIVING (ADL)
ADLS_ACUITY_SCORE: 28
ADLS_ACUITY_SCORE: 28
DRESS: INDEPENDENT
ADLS_ACUITY_SCORE: 28
HYGIENE/GROOMING: INDEPENDENT
HYGIENE/GROOMING: INDEPENDENT
ORAL_HYGIENE: INDEPENDENT
ADLS_ACUITY_SCORE: 28
ORAL_HYGIENE: INDEPENDENT
ADLS_ACUITY_SCORE: 28
DRESS: INDEPENDENT

## 2023-11-10 NOTE — PROGRESS NOTES
11/09/23 4499   Patient Belongings   Did you bring any home meds/supplements to the hospital?  No   Patient Belongings other (see comments)   Belongings Search Yes   Clothing Search Yes   Second Staff Jam Peters     STORAGE BIN:   Coat, pants    MED ROOM BIN:   Key fob, [employer]# [id number]: id, gonzales  A             Admission:  I am responsible for any personal items that are not sent to the safe or pharmacy.  [facility] is not responsible for loss, theft or damage of any property in my possession.    Signature:  _________________________________ Date: _______  Time: _____                                              Staff Signature:  ____________________________ Date: ________  Time: _____      2nd Staff person, if patient is unable/unwilling to sign:    Signature: ________________________________ Date: ________  Time: _____   Discharge:  [facility] has returned all of my personal belongings:    Signature: _________________________________ Date: ________  Time: _____                                          Staff Signature:  ____________________________ Date: ________  Time: _____

## 2023-11-10 NOTE — PHARMACY-ADMISSION MEDICATION HISTORY
Please see Admission Medication History note completed by pharmacist on 11/9/23 under previous encounter at [facility] for information regarding prior to admission medications.    Rian Reddy RPH on 11/10/2023 at 7:44 AM

## 2023-11-10 NOTE — PLAN OF CARE
"3A Admission Note    S = Situation:   Chuck Magdaleno is a 33 year old year old male with a chief complaint of Alcohol Intoxication  Voluntary admission to [facility] for Alcohol Detox.  B  = Background:    Per ER notes and Nurse to nurse notes:   History of withdrawal seizures who presents with alcohol intoxication, hematemesis, abdominal pain, and suicidal ideation. He was hospitalized for alcohol dependence one week ago. Patient states \"It'd be easier for me and the medical system if I just didn't wake up,\" and says he drank until he thought he might not wake up. He reports he was sober February-July of this year, and has been drinking 1-2 Liter of Vodka daily since then. He attempted to enter detox at [facility] in [place], but did not receive a call back.    Recent life stressors: Homeless \"I'm Living out of my car\"     A  =  Assessment:   During admission interview, pt affect was blunted, flat, and tearful, anxious and depressed, tremulous and diaphoresis. He reported stomach burning and pain. Zofran given for emesis/nausea  MSSA 19, pt medicated with 10 mg Valium upon admission.Contracted for safety; pt states he has no plan now and does not plan to hurt anyone. He reports no falls and no allergies.    BP (!) 169/93 (Patient Position: Sitting, Cuff Size: Adult Regular)   Pulse 95   Temp 98.9  F (37.2  C)   Resp 18   Ht 1.803 m (5' 11\")   Wt 82.6 kg (182 lb)   SpO2 98%   BMI 25.38 kg/m       R =   Request or Recommendation:   Patient withdrawal will be monitored and treated using MSSA with valium.  Pt will meet with psychiatry, internal medicine, and case management tomorrow.  At the time of admission, pt reports discharge plan is Treatment (in-pt). We advised pt to talk over treatment options with .    "

## 2023-11-10 NOTE — CONSULTS
[facility]  Consult Note - Hospitalist Service  Date of Admission:  11/9/2023  Consult Requested by: Dominik Lopes MD   Reason for Consult: Detox     Assessment & Plan   Chuck Magdaleno is a 33 year old male who ends for hematemesis and alcohol withdrawal.  His past medical history of alcohol use disorder, hematemesis, abdominal pain, suicidal ideation, anxiety, alcohol withdrawal seizures, ADHD, cannabinoid use disorder, and depression.    Medicine will sign off as there are no acute issues.     Discharge recommendations  -Continue daily PPI at discharge for 8 weeks  -Recommend trending lipids and hemoglobin in 4-6  -PCP referral placed    Acute alcohol withdrawal  Polysubstance use disorder (Alcohol, cannabinoids)  Hx of alcohol withdrawal seizures   Patient presents for this admission with seeking alcohol detox, along with   hematemesis, abdominal pain and suicidal ideation patient making SI statements with a plan to drink until he may not wake up.  He presents via EMS and has been living in his car.  Noting abdominal pain upon admission to the ED. He reports coffee-ground emesis this morning at a rest stop.  Was given 4 mg of Zofran and 250 mls of normal saline in route.  No nausea and vomiting in the ED.  Was started on Protonix for presumed gastritis.Last drink on prior to admission. Drinks 1-2 L of vodka daily.patient reports history of alcohol withdrawal seizures but no DTs blood alcohol level of 0.29.Utox notable for benzodiazepines, cannabinoids, and barbiturates. Alk phos WNL.Total bili WNL. AST: ALT WNL. MSSA score of 4-8 since arrival.  On Keppra for history of alcohol withdrawal seizures.   - Management per Psychiatry  - No need for BMP/hepatic panel unless clinically indicated   - Agree with MSSA protocol  - Agree with MVI, folate, thiamine   -Can continue Keppra for alcohol withdrawal seizures but defer to primary team given recent Rx per chart  "review, no history of seizures disorders then due to alcohol/     Tobacco use disorder  - Nicotine patch and as needed gum/lozenges lozenges    MDD  ROBERTO CARLOS  ADHD  SI  Presents to making SI statements.   - Per Psychiatry     Hematemesis, resolved  Alcohol gastritis  Hx of reported melena  Presents with reports of hematemesis in the morning prior to arrival  Notes nausea present but resolving and no emesis since here.  Denies any chest pain, epigastric or back pain. Feels like he has a \"ball of acid in his stomach.\"  Denies any further hematemesis, melena, and is tolerating p.o. Hgb WNL. Last presented with similar complaints on 10/27/2023 with normal CBC and CT abdomen without any acute findings.  I suspect his s/s are consistent with alcoholic gastritis given ongoing reports with continued alcohol usage.   -Continue daily PTA protonix 40 mg for 8 weeks  -Follow-up with PCP (referral placed) and trend Hgb in 4-6 weeks post-discharge if no ongoing bleeding  -If ongoing abdominal pain worsening after stopping     Elevated lipid panel    Recent Labs   Lab Test 11/10/23  0744   CHOL 226*   HDL 97   *   TRIG 112     -Recommend following up outpatient with PCP in 4 to 6 weeks  -Alcohol cessation recommended     The patient's care was discussed with the Bedside Nurse, Patient, and Primary team(via this note).     Clinically Significant Risk Factors Present on Admission             # Anion Gap Metabolic Acidosis: Highest Anion Gap = 20 mmol/L in last 2 days, will monitor and treat as appropriate           # Overweight: Estimated body mass index is 25.38 kg/m  as calculated from the following:    Height as of this encounter: 1.803 m (5' 11\").    Weight as of this encounter: 82.6 kg (182 lb).       # Financial/Environmental Concerns:    # Support System: poor social support noted in nursing assessment         MARIO ALBERTO Collazo [facility]  Hospitalist Service  Securely message with [facility] (more info)  Text page via [employer] " Paging/Directory     This chart documentation was completed with Dragon voice-recognition software. Even though reviewed, this chart may still contain some grammatical, spelling, and word errors. Please contact the author for any questions or clarifications.   ______________________________________________________________________    Chief Complaint   Acute alcohol withdrawal  Alcohol use disorder    History is obtained from the patient and per chart review.    History of Present Illness   Chuck Magdaleno is a 33 year old male who ends for hematemesis and alcohol withdrawal.  His past medical history of alcohol use disorder, hematemesis, abdominal pain, suicidal ideation, anxiety, alcohol withdrawal seizures, ADHD, cannabinoid use disorder, and depression.    On 10/27/2023 he presented for noting dark stools as well as progressive discomfort in the epigastric region and reporting vomiting with coffee-ground emesis and some flecks of bright red blood.  CT of the abdomen without any acute findings.  CBC without anemia and normal BNP.Has had 10 ED visits since January of this year for alcohol intoxication and withdrawal and for abdominal pain. Patient was hospitalized from 11/3-11/6/2023 at [facility] for acute alcohol withdrawal and alcohol gastritis.  He was treated for alcohol withdrawal with CIWA protocol, clonidine, gabapentin with improvement.  He was discharged with a gabapentin taper and 2 weeks of PPI due to his alcoholic gastritis.      Patient presents for this admission with seeking alcohol detox, hematemesis, abdominal pain and suicidal ideation patient making SI statements with a plan to drink until he may not wake up.  He presents via EMS and has been living in his car.  Noting abdominal pain upon admission to the ED. He reports coffee-ground emesis this morning at a rest stop.  Was given 4 mg of Zofran and 250 mls of normal saline in route.  No nausea and vomiting in the ED.  Was started on  Protonix for presumed gastritis.Last drink on prior to admission. Drinks 1-2 L of vodka daily.patient reports history of alcohol withdrawal seizures but no DTs blood alcohol level of 0.29.Utox notable for benzodiazepines, cannabinoids, and barbiturates. Alk phos WNL.Total bili WNL. AST: ALT WNL.  Hemoglobin within normal limits at 14.7. MSSA score of 4-8 since arrival.     Upon interview and exam, patient noting that abdominal pain is improving although he still feels like he has a ball of acid in his stomach.  Also noting some pins-and-needles in his hands and feet feet that is getting better.  Notes he has this when he drinks and it subsequently resolves when he stops drinking.  Reports that hematemesis since arrival.  Dressing a mild headache. Denies any headaches, fevers, dysphagia, SOB, chest pain,  dysuria, back pain or leg swelling.     Past Medical History    No past medical history on file.    Past Surgical History   Past Surgical History:   Procedure Laterality Date    APPENDECTOMY OPEN         Medications   I have reviewed this patient's current medications  Medications Prior to Admission   Medication Sig Dispense Refill Last Dose    busPIRone (BUSPAR) 10 MG tablet Take 10 mg by mouth 2 times daily       cholecalciferol 50 MCG (2000 UT) CAPS Take 50 mcg by mouth daily       folic acid (FOLVITE) 1 MG tablet Take 1 tablet (1 mg) by mouth daily 30 tablet 0     gabapentin (NEURONTIN) 300 MG capsule Take 2 capsules (600 mg) by mouth 3 times daily for 3 days, THEN 1 capsule (300 mg) 3 times daily for 3 days, THEN 1 capsule (300 mg) daily for 3 days. 12 capsule 0     hydrOXYzine (VISTARIL) 50 MG capsule Take 50 mg by mouth 3 times daily as needed for anxiety       levETIRAcetam (KEPPRA) 500 MG tablet Take 500 mg by mouth daily       Multiple Vitamin (TAB-A-TYLER/BETA CAROTENE) TABS Take 1 tablet by mouth daily       nicotine (NICODERM CQ) 21 MG/24HR 24 hr patch Place 1 patch onto the skin every 24 hours        pantoprazole (PROTONIX) 40 MG EC tablet Take 40 mg by mouth 2 times daily       sertraline (ZOLOFT) 100 MG tablet Take 150 mg by mouth daily       thiamine (B-1) 100 MG tablet Take 1 tablet (100 mg) by mouth daily 30 tablet 0     traZODone (DESYREL) 100 MG tablet Take 100 mg by mouth nightly as needed for sleep           Review of Systems    The 10 point Review of Systems is negative other than noted in the HPI or here.     Social History   I have reviewed this patient's social history and updated it with pertinent information if needed.  Social History     Tobacco Use    Smoking status: Every Day     Types: Vaping Device    Smokeless tobacco: Never   Substance Use Topics    Alcohol use: Yes     Comment: recently replased    Drug use: Yes     Types: Marijuana     Comment: occasionally         Allergies   No Known Allergies     Physical Exam   Vital Signs: Temp: 97.7  F (36.5  C) Temp src: Oral BP: 111/70 Pulse: 66   Resp: 14 SpO2: 94 % O2 Device: None (Room air)    Weight: 182 lbs 0 oz    General Appearance: In NAD, sitting in chair  Respiratory: LS clear b/l, normal RR  Cardiovascular: S1, S2, no m/r/g  GI: BS+, all 4 quadrants, no masses, tenderness upon palpation left upper quadrant and in mid abdominal quadrant  Skin: Intact on face, arms, legs. No wounds, bruising, or lesions noted.  Other: A&Ox4, moving all extremities      Medical Decision Making       45 MINUTES SPENT BY ME on the date of service doing chart review, history, exam, documentation & further activities per the note.      Data     I have personally reviewed the following data over the past 24 hrs:    7.9  \   14.7   / 344     144 105 12.8 /  84   4.0 19 (L) 0.69 \     ALT: 16 AST: 22 AP: 78 TBILI: 0.2   ALB: 4.4 TOT PROTEIN: 7.6 LIPASE: N/A       Imaging results reviewed over the past 24 hrs:   No results found for this or any previous visit (from the past 24 hour(s)).

## 2023-11-10 NOTE — PROGRESS NOTES
1920: MSSA 6    2040: MSSA 8 , medicated with 10 mg valium    /75  Pulse 90   Temp 98.5  F (36.9  C) (Oral)   Resp 16    SpO2 98%      Pt calm and resting in his room. Reports feeling better.

## 2023-11-10 NOTE — COMMUNITY RESOURCES LIST (ENGLISH)
11/10/2023   [facility]  N/A  For questions about this resource list or additional care needs, please contact your primary care clinic or care manager.  Phone: [phone]   Email: N/A   Address: [address] [place] [id number]   Hours: N/A        Substance Use       Outpatient substance use treatment  1  [employer] [facility] Distance: 3.76 miles      In-Person   [address] [facility] [address] [address]  Language: English  Hours: Mon - Fri 8:30 AM - 4:30 PM  Fees: Insurance, Self Pay, Sliding Fee, State or [facility] Child Welfare Agency   Phone: (194) 646-5620 Email: info@"[employer] Website: https://"[employer]/naye     2  [facility] [address] Distance: 4.04 miles      In-Person, Phone/Virtual   [address] [place] [address]  Language: English  Hours: Mon - Fri 8:00 AM - 5:00 PM  Fees: Insurance, Self Pay, Sliding Fee   Phone: (268) 319-5790 Email: rufina@[employer] Website: https://[employer]     Substance use support group  3  [facility] Distance: 3.72 miles      In-Person, Phone/Virtual   [address] [place] [address]  Language: English, Kiswahili  Hours: Mon - Tue 7:00 AM - 7:00 PM , Wed 7:00 AM - 8:00 PM , Thu 7:00 AM - 7:00 PM , Fri 7:00 AM - 5:00 PM  Fees: Insurance, Self Pay, Sliding Fee   Phone: (466) 945-2038 Email: contactus@[employer] Website: https://www.[facility] Hospital[facility]u.us/our-locations/[place]/[facility]/     4  [facility] Distance: 8.78 miles      Phone/Virtual   [address] [place] [address]  Language: English  Hours: Mon - Fri 8:00 AM - 5:00 PM  Fees: Insurance, Self Pay   Phone: (478) 877-8945 Email: info@[employer] Website: http://www.[employer]/          Important Numbers & Websites       Emergency Services    [address] [address]  Poison Control   (780) 383-6415  Suicide Prevention Lifeline   (990) 177-8722 (TALK)  Child Abuse Hotline   (803) 559-2325 (4-A-Child)  Sexual Assault Hotline   (994) 746-7799 (HOPE)  National Runaway Safeline   (404) 877-7388 (RUNAWAY)  All-Options Talkline   (801) 476-4125  Substance Abuse Referral   (270) 481-6151 (HELP)

## 2023-11-10 NOTE — PROGRESS NOTES
Triage & Transition Services, [facility]     Chuck Magdaleno  November 10, 2023    Insurance: [employer]      Legal Status: Voluntary      SUDs Assessment Status: None needed at this time.     ROIs on file: None     Living Situation: Patient reports he is homeless at this time.     Current Providers and Supports:  None    Encounter: Writer met with patient to discuss discharge planning and aftercare plans, patient stated he is set to attend [facility] but believes he has lost his spot due to being in the hospital. Writer and patient called [facility] and they stated that there was a bed for him however they called him and there was no answer so they had to move on to the next person on the waiting list. [facility] stated that there is a bed available for 11/28/2023 and things can change next week. [facility] directed patient to call Monday to see if any thing changes with the admissions that are scheduled. Patient stated he will call his mother to secure the next steps for discharge planning. Next CM to follow up with patient over the weekend or on Monday.     Collateral: None     Current Plan: Unknown at this time.     RN updated.    CATRACHO SNYDER  Triage & Transition Services - Mental Health and Addiction Service Line  [facility] - Adult Inpatient Addiction Psychiatry Unit

## 2023-11-10 NOTE — DISCHARGE INSTRUCTIONS
Behavioral Discharge Planning and Instructions  THANK YOU FOR CHOOSING THE [facility]  3A  [phone]    Summary: You were admitted to [facility] on 11/09/2023 for detoxification from alcohol.  A medical exam was performed that included lab work. You have met with a  and opted to detox and look into inpatient treatments.  Please take care and make your recovery a priority, Chuck!    Recommendation:    Chuck is recommended to attend and participate in a inpatient treatment at [facility] in [place], [place]. However if you change your mind [facility] reports that they have a bed available for 11/28/2023 or sooner.     Main Diagnosis: Per Dr. Dominik Lopes MD  303.90 (F10.20) Alcohol Use Disorder Severe    Major Treatments, Procedures and Findings:  You have withdrawn from alcohol  using Valium.  You have met with a  to develop a treatment plan for discharge.  You have had labs drawn and those results have been reviewed with you. Please take a copy of your lab work with you to your next primary care physician appointment.  Major Treatments, Procedures and Findings:  You were detoxed from alcohol with the Modified Selective Severity Protocol using Valium. You have met with a  to develop a treatment plan for discharge.  You have had labs drawn and a copy of those labs will be sent home with you.  Please bring your lab results with to your follow up doctor appointment.    Symptoms to Report:  If you experience more anxiety, confusion, sleeplessness, deep sadness or thoughts of suicide, notify your treatment team or notify your primary care physician. IF ANY OF THE SYMPTOMS YOU ARE EXPERIENCING ARE A MEDICAL EMERGENCY CALL 911 IMMEDIATELY.     If you or someone you know is struggling or in crisis, help is available.  Call or text 074 or chat  at [employer]    Lifestyle Adjustment: Adjust your lifestyle to get enough  "sleep, relaxation, exercise and  good nutrition. Continue to develop healthy coping skills to decrease stress and promote a sober living environment. Do not use alcohol, illegal drugs or addictive medications other than what is currently prescribed. AA, NA, and  Sponsor are excellent resources for support.     Primary Provider:    Disposition: Discharge home to inpatient treatment.      Facts about COVID19 at www.cdc.gov/COVID19 and www.[place].gov/covid19    Keeping hands clean is one of the most important steps we can take to avoid getting sick and spreading germs to others.  Please wash your hands frequently and lather with soap for at least 20 seconds!    Follow-up Appointment:   Appointment Date/Time: Pt will make own appointment. Psychiatrist/Primary Care Giver:  unknown at this time    Resources:     Resources for on line recovery meetings:  AA meetings can be found online; search for them at: http://aa-intergroup.org/directory.php  AA meetings via [employer] for [place] area can be found online at: https://aaWikiswayneapolWeddingLovely.org/find-a-meeting/holiday-closings/  NA meetings via [place] for [place] area can be found online at: https://sites.[employer].com/view/[place]regionofnarcoticsanonymous/home?authuser=2    Www.[employer]  has online resources for meeting and recovery care including Podcast \"Let's Talk:Addiction & Recovery Podcasts    Www.[place]recovery.org     DISCHARGE RESOURCES:  -SMART Recovery - self management for addiction recovery:  www.smartrecovery.org    -Pathways ~ A Health Crisis Resource & Support Center: 575.768.4175.  -[facility] 577-019-7316   -[facility] Behavioral Intake 367-633-6401 or 159-527-5924.  -Suicide Awareness Voices of Education (SAVE) (www.save.org): 239-022-ADUQ (2653)  -National Suicide Prevention Line (www.mentalhealthmn.org): 491-427-ZBJR (8551)  -National Coleridge on Mental Illness (www.[place].vanessa.org): 968.815.2287 or 292-799-6922.  -Rbdf1ayrd: text the word LIFE to 18301 " for immediate support and crisis intervention  -Mental Health Consumer/Survivor Network of [place] (www.mhcsn.net): 970.970.4587 or 750-564-3169  -Mental Health Association of [place] (www.mentalhealth.org): 600.539.7947 or 929-201-1402     -Substance Abuse and Mental Health Services (www.samhsa.gov)  -Harm Reduction Coalition (www. Harmreduction.org)  -www.prescribetoprevent.org or http://prescribetoprevent.org/video  -Poison control 2-235-050-3204   **[place] Opioid Prevention Coalition: www.opioidcoalition.org    Sober Support Group Information:  AA/NA & Sponsor/Support  -Alcoholics Anonymous (www.alcoholics-anonymous.org): for local information 24 hours/day  -AA Intergroup service office in [place] (http://www.aastpaul.org/) 382.217.1774  -AA Intergroup service office in [facility]: 883.766.6919. (http://www.aamin[place].org/)  -Narcotics Anonymous (www.naminn[place].org) (809) 336-3399   **Sober Fun Activities: www.soberRepairPalactivities.[employer]//[facility] Recovery Connection ([facility])  [facility] connects people seeking recovery to resources that help foster and sustain long-term recovery.  Whether you are seeking resources for treatment, transportation, housing, job training, education, health care or other pathways to recovery, [facility] is a great place to start.    Phone: (166) 600-9546.  www.[place] [employer] (Great listing of all types of recovery and non-recovery related resources)      General Medication Instructions:   See your medication sheet(s) for instructions.   Take all medicines as directed.  Make no changes unless your doctor suggests them.   Go to all your doctor visits.  Be sure to have all your required lab tests. This way, your medicines can be refilled on time.  Do not use any drugs not prescribed by your provider.  AA/NA and Sponsors are excellent resources for support  Avoid alcohol.    Any follow up concerns:  Nursing questions call the [address]  [phone]  Medical Record call [phone]  Outpatient Behavioral Intake call [phone]  LP+ Wait List/Bed Availability call [phone]    The entire treatment team has appreciated the opportunity to work with you Chuck.  We wish you the best in the future and with your lifelong recovery goals. Please bring this discharge folder with you to all follow up appointments.  It contains your lab results, diagnosis, medication list and discharge recommendations.    THANK YOU FOR CHOOSING THE [facility]

## 2023-11-10 NOTE — PLAN OF CARE
Behavioral Team Discussion: (11/10/2023)    Continued Stay Criteria/Rationale: Patient admitted for Chemical Use Issues.  Plan: The following services will be provided to the patient; psychiatric assessment, medication management, therapeutic milieu, individual and group support, and skills groups.   Participants: 3A Provider: Dr. Dominik Lopes MD; 3A RN:  Josep Matos RN; 3A CM's:  Sylvie Hicks .  Summary/Recommendation: Providers will assess today for treatment recommendations, discharge planning, and aftercare plans. CM will meet with pt for discharge planning.   Medical/Physical: Patient reported he was experiencing abdominal pain.  Precautions:   Behavioral Orders   Procedures    Code 1 - Restrict to Unit    Routine Programming     As clinically indicated    Seizure precautions    Status 15     Every 15 minutes.    Withdrawal precautions     Rationale for change in precautions or plan: N/A  Progress: Initial.    ASAM Dimension Scale Ratings:  Dimension 1: 3 Client tolerates and elana with withdrawal discomfort poorly. Client has severe intoxication, such that the client endangers self or others, or intoxication has not abated with less intensive levels of services. Client displays severe signs and symptoms; or risk of severe, but manageable withdrawal; or withdrawal worsening despite detox at less intensive level.  Dimension 2: 1 Client tolerates and elana with physical discomfort and is able to get the services that the client needs.  Dimension 3: 3 Client has a severe lack of impulse control and coping skills. Client has frequent thoughts of suicide or harm to others including a plan and the means to carry out the plan. In addition, the client is severely impaired in significant life areas and has severe symptoms of emotional, behavioral, or cognitive problems that interfere with the client ability to participate in treatment activities.  Dimension 4: 2 Client displays verbal compliance, but lacks  consistent behaviors; has low motivation for change; and is passively involved in treatment.  Dimension 5: 4 No awareness of the negative impact of mental health problems or substance abuse. No coping skills to arrest mental health or addiction illnesses, or prevent relapse.  Dimension 6: 4 Client has (A) Chronically antagonistic significant other, living environment, family, peer group or long-term criminal justice involvement that is harmful to recovery or treatment progress, or (B) Client has an actively antagonistic significant other, family, work, or living environment with immediate threat to the client's safety and well-being.

## 2023-11-10 NOTE — PLAN OF CARE
"Goal Outcome Evaluation:    Plan of Care Reviewed With: patient       Pt MSSA is 8 and 5. He received 5 mg for his score of 8. He has a tremor. He has a hand tremor and reports VH seeing shadows. He received PRN maalox for heartburn. He received information about antabuse. He is visible in the milieu. He denies SI, HI, AH, and pain.    /89   Pulse 74   Temp 99.6  F (37.6  C) (Oral)   Resp 16   Ht 1.803 m (5' 11\")   Wt 82.6 kg (182 lb)   SpO2 99%   BMI 25.38 kg/m               "

## 2023-11-10 NOTE — H&P
"[facility], [place]   Psychiatric History and Physical  Admission date: 11/9/2023        Chief Complaint:   \"I'm better this morning.\"         HPI:     The patient is a 34yo male with a history of alcohol use disorder and depression who was admitted to detoxify from alcohol. He reports that he is \"better this morning than I was yesterday.\" He reports that he didn't get much sleep due to \"sweats\" and feeling \"hot and cold.\" Nausea is improving so he was able to eat some this morning. Has been off his psychotropics for four days. Has been on Naltrexone in the past but didn't find it helpful. Open to Antabuse. Denies any current SI and feels safe here in the hospital. Some recent AH of hearing \"music.\" Says that he plans to enter CD treatment at [facility] for 90 days.     Per ER:  Chuck Magdaleno is a 33 year old male with a history of alcohol dependence and withdrawal seizures who presents with alcohol intoxication, hematemesis, abdominal pain, and suicidal ideation. He was hospitalized for alcohol dependence one week ago. Patient states \"It'd be easier for me and the medical system if I just didn't wake up,\" and says he drank until he thought he might not wake up. He reports he was sober February-July of this year, and has been drinking daily since then. He attempted to enter detox at [facility] in [place], but did not receive a call back.          Past Psychiatric History:     MDD, ROBERTO CARLOS. On Zoloft and Buspar most recently. Has been off for \"four days\". Denies any history of suicide attempts.         Substance Use and History:     Alcohol use disorder. Does have a history of withdrawal seizures and DTs.   Smoker.         Past Medical History:   PAST MEDICAL HISTORY: No past medical history on file.    PAST SURGICAL HISTORY:   Past Surgical History:   Procedure Laterality Date    APPENDECTOMY OPEN               Family History:   FAMILY HISTORY: Sister with " "depression. Grandfather and uncles with AUD.         Social History:   Please see the full psychosocial profile from the clinical treatment coordinator.   SOCIAL HISTORY:   Social History     Tobacco Use    Smoking status: Every Day     Types: Vaping Device    Smokeless tobacco: Never   Substance Use Topics    Alcohol use: Yes     Comment: recently replased            Physical ROS:   The patient endorsed \"sweats\". The remainder of 10-point review of systems was negative except as noted in HPI.         PTA Medications:     Medications Prior to Admission   Medication Sig Dispense Refill Last Dose    busPIRone (BUSPAR) 10 MG tablet Take 10 mg by mouth 2 times daily       cholecalciferol 50 MCG (2000 UT) CAPS Take 50 mcg by mouth daily       folic acid (FOLVITE) 1 MG tablet Take 1 tablet (1 mg) by mouth daily 30 tablet 0     gabapentin (NEURONTIN) 300 MG capsule Take 2 capsules (600 mg) by mouth 3 times daily for 3 days, THEN 1 capsule (300 mg) 3 times daily for 3 days, THEN 1 capsule (300 mg) daily for 3 days. 12 capsule 0     hydrOXYzine (VISTARIL) 50 MG capsule Take 50 mg by mouth 3 times daily as needed for anxiety       levETIRAcetam (KEPPRA) 500 MG tablet Take 500 mg by mouth daily       Multiple Vitamin (TAB-A-TYLER/BETA CAROTENE) TABS Take 1 tablet by mouth daily       nicotine (NICODERM CQ) 21 MG/24HR 24 hr patch Place 1 patch onto the skin every 24 hours       pantoprazole (PROTONIX) 40 MG EC tablet Take 40 mg by mouth 2 times daily       sertraline (ZOLOFT) 100 MG tablet Take 150 mg by mouth daily       thiamine (B-1) 100 MG tablet Take 1 tablet (100 mg) by mouth daily 30 tablet 0     traZODone (DESYREL) 100 MG tablet Take 100 mg by mouth nightly as needed for sleep             Allergies:   No Known Allergies       Labs:     Recent Results (from the past 48 hour(s))   Comprehensive metabolic panel    Collection Time: 11/09/23 10:25 AM   Result Value Ref Range    Sodium 144 135 - 145 mmol/L    Potassium 4.0 3.4 " - 5.3 mmol/L    Carbon Dioxide (CO2) 19 (L) 22 - 29 mmol/L    Anion Gap 20 (H) 7 - 15 mmol/L    Urea Nitrogen 12.8 6.0 - 20.0 mg/dL    Creatinine 0.69 0.67 - 1.17 mg/dL    GFR Estimate >90 >60 mL/min/1.73m2    Calcium 8.7 8.6 - 10.0 mg/dL    Chloride 105 98 - 107 mmol/L    Glucose 84 70 - 99 mg/dL    Alkaline Phosphatase 78 40 - 129 U/L    AST 22 0 - 45 U/L    ALT 16 0 - 70 U/L    Protein Total 7.6 6.4 - 8.3 g/dL    Albumin 4.4 3.5 - 5.2 g/dL    Bilirubin Total 0.2 <=1.2 mg/dL   Ethyl Alcohol Level    Collection Time: 11/09/23 10:25 AM   Result Value Ref Range    Alcohol ethyl 0.29 (H) <=0.01 g/dL   CBC with platelets and differential    Collection Time: 11/09/23 10:25 AM   Result Value Ref Range    WBC Count 7.9 4.0 - 11.0 10e3/uL    RBC Count 4.98 4.40 - 5.90 10e6/uL    Hemoglobin 14.7 13.3 - 17.7 g/dL    Hematocrit 43.4 40.0 - 53.0 %    MCV 87 78 - 100 fL    MCH 29.5 26.5 - 33.0 pg    MCHC 33.9 31.5 - 36.5 g/dL    RDW 13.2 10.0 - 15.0 %    Platelet Count 344 150 - 450 10e3/uL    % Neutrophils 83 %    % Lymphocytes 14 %    % Monocytes 2 %    % Eosinophils 0 %    % Basophils 1 %    % Immature Granulocytes 0 %    NRBCs per 100 WBC 0 <1 /100    Absolute Neutrophils 6.5 1.6 - 8.3 10e3/uL    Absolute Lymphocytes 1.1 0.8 - 5.3 10e3/uL    Absolute Monocytes 0.2 0.0 - 1.3 10e3/uL    Absolute Eosinophils 0.0 0.0 - 0.7 10e3/uL    Absolute Basophils 0.1 0.0 - 0.2 10e3/uL    Absolute Immature Granulocytes 0.0 <=0.4 10e3/uL    Absolute NRBCs 0.0 10e3/uL   Adult Type and Screen    Collection Time: 11/09/23 10:25 AM   Result Value Ref Range    ABO/RH(D) O NEG     Antibody Screen Negative Negative    SPECIMEN EXPIRATION DATE [id number]    Urine Drug Screen Panel    Collection Time: 11/09/23  1:51 PM   Result Value Ref Range    Amphetamines Urine Screen Negative Screen Negative    Barbituates Urine Screen Positive (A) Screen Negative    Benzodiazepine Urine Screen Positive (A) Screen Negative    Cannabinoids Urine Screen  "Positive (A) Screen Negative    Cocaine Urine Screen Negative Screen Negative    Fentanyl Qual Urine Screen Negative Screen Negative    Opiates Urine Screen Negative Screen Negative    PCP Urine Screen Negative Screen Negative          Physical and Psychiatric Examination:     /70 (BP Location: Left arm, Patient Position: Supine, Cuff Size: Adult Regular)   Pulse 66   Temp 97.7  F (36.5  C) (Oral)   Resp 14   Ht 1.803 m (5' 11\")   Wt 82.6 kg (182 lb)   SpO2 94%   BMI 25.38 kg/m    Weight is 182 lbs 0 oz  Body mass index is 25.38 kg/m .    Physical Exam:  I have reviewed the physical exam as documented by the medical team and agree with findings and assessment and have no additional findings to add at this time.    Mental Status Exam:  Appearance: awake, alert and adequately groomed  Attitude:  cooperative  Eye Contact:  good  Mood:  anxious  Affect:  mood congruent  Speech:  clear, coherent  Language: fluent and intact in English  Psychomotor, Gait, Musculoskeletal:  no evidence of tardive dyskinesia, dystonia, or tics  Thought Process:  goal oriented  Associations:  no loose associations  Thought Content:  no evidence of suicidal ideation or homicidal ideation and no evidence of psychotic thought  Insight:  fair  Judgement:  intact  Oriented to:  time, person, and place  Attention Span and Concentration:  intact  Recent and Remote Memory:  fair  Fund of Knowledge:  appropriate         Admission Diagnoses:     Alcohol use disorder, severe  Alcohol withdrawal with unspecified complication   MDD, recurrent, moderate to severe without psychosis  ROBERTO CARLOS  Nicotine dependence with current use     Clinically Significant Risk Factors Present on Admission          # Anion Gap Metabolic Acidosis: Highest Anion Gap = 20 mmol/L in last 2 days, will monitor and treat as appropriate           # Overweight: Estimated body mass index is 25.38 kg/m  as calculated from the following:    Height as of this encounter: 1.803 m " "(5' 11\").    Weight as of this encounter: 82.6 kg (182 lb).       # Financial/Environmental Concerns:      # Support System: poor social support noted in nursing assessment                 Assessment & Plan:     1) MSSA with Valium.   2) Continue Keppra. Medicine reviewing.   3) Nicotine replacement available.   4) Will restart Zoloft and Buspar after patient progresses further through detox.   5) Will provide Antabuse at discharge.   6) Patient is open to CD treatment.     Disposition Plan   Reason for ongoing admission: requires detoxification from substance that poses a risk of bodily harm during withdrawal period  Discharge location: Chemical dependency treatment facility  Discharge Medications: not ordered  Follow-up Appointments: not scheduled  Legal Status: voluntary    Entered by: Dominik Lopes MD on 11/10/2023 at 4:58 AM             "

## 2023-11-10 NOTE — PLAN OF CARE
Problem: Alcohol Withdrawal  Goal: Alcohol Withdrawal Symptom Control  Outcome: Progressing   Goal Outcome Evaluation:       Pt.continues to be monitored for acute alcohol withdrawal monitoring. He scored 8 & 4. Received 10mg prn valium. No medication adverse side effects reported or observed. Pt.slept through the night. No safety or behavioral concerns reported or observed. Will continue to monitor.

## 2023-11-11 VITALS
RESPIRATION RATE: 16 BRPM | WEIGHT: 182 LBS | HEART RATE: 61 BPM | HEIGHT: 71 IN | DIASTOLIC BLOOD PRESSURE: 85 MMHG | BODY MASS INDEX: 25.48 KG/M2 | TEMPERATURE: 99.4 F | SYSTOLIC BLOOD PRESSURE: 132 MMHG | OXYGEN SATURATION: 100 %

## 2023-11-11 PROCEDURE — 250N000013 HC RX MED GY IP 250 OP 250 PS 637

## 2023-11-11 PROCEDURE — 99239 HOSP IP/OBS DSCHRG MGMT >30: CPT | Performed by: PSYCHIATRY & NEUROLOGY

## 2023-11-11 PROCEDURE — 250N000013 HC RX MED GY IP 250 OP 250 PS 637: Performed by: PSYCHIATRY & NEUROLOGY

## 2023-11-11 RX ORDER — LEVETIRACETAM 500 MG/1
500 TABLET ORAL DAILY
Qty: 30 TABLET | Refills: 1 | Status: SHIPPED | OUTPATIENT
Start: 2023-11-11

## 2023-11-11 RX ORDER — NICOTINE 21 MG/24HR
1 PATCH, TRANSDERMAL 24 HOURS TRANSDERMAL EVERY 24 HOURS
Qty: 28 PATCH | Refills: 1 | Status: SHIPPED | OUTPATIENT
Start: 2023-11-11

## 2023-11-11 RX ORDER — SERTRALINE HYDROCHLORIDE 100 MG/1
100 TABLET, FILM COATED ORAL DAILY
Qty: 45 TABLET | Refills: 1 | Status: SHIPPED | OUTPATIENT
Start: 2023-11-11

## 2023-11-11 RX ORDER — HYDROXYZINE PAMOATE 50 MG/1
50 CAPSULE ORAL 3 TIMES DAILY PRN
Qty: 90 CAPSULE | Refills: 1 | Status: SHIPPED | OUTPATIENT
Start: 2023-11-11

## 2023-11-11 RX ORDER — BUSPIRONE HYDROCHLORIDE 10 MG/1
10 TABLET ORAL 2 TIMES DAILY
Qty: 60 TABLET | Refills: 1 | Status: SHIPPED | OUTPATIENT
Start: 2023-11-11

## 2023-11-11 RX ORDER — LANOLIN ALCOHOL/MO/W.PET/CERES
100 CREAM (GRAM) TOPICAL DAILY
Qty: 30 TABLET | Refills: 0 | Status: SHIPPED | OUTPATIENT
Start: 2023-11-11

## 2023-11-11 RX ORDER — TRAZODONE HYDROCHLORIDE 100 MG/1
100 TABLET ORAL
Qty: 30 TABLET | Refills: 1 | Status: SHIPPED | OUTPATIENT
Start: 2023-11-11

## 2023-11-11 RX ORDER — FOLIC ACID 1 MG/1
1 TABLET ORAL DAILY
Qty: 30 TABLET | Refills: 1 | Status: SHIPPED | OUTPATIENT
Start: 2023-11-11

## 2023-11-11 RX ORDER — MULTIPLE VITAMINS W/ MINERALS TAB 9MG-400MCG
1 TAB ORAL DAILY
Qty: 30 TABLET | Refills: 1 | Status: SHIPPED | OUTPATIENT
Start: 2023-11-12

## 2023-11-11 RX ORDER — DISULFIRAM 250 MG/1
250 TABLET ORAL DAILY
Qty: 30 TABLET | Refills: 3 | Status: SHIPPED | OUTPATIENT
Start: 2023-11-11

## 2023-11-11 RX ORDER — PANTOPRAZOLE SODIUM 40 MG/1
40 TABLET, DELAYED RELEASE ORAL
Qty: 30 TABLET | Refills: 1 | Status: SHIPPED | OUTPATIENT
Start: 2023-11-12

## 2023-11-11 RX ADMIN — FOLIC ACID 1 MG: 1 TABLET ORAL at 08:28

## 2023-11-11 RX ADMIN — PANTOPRAZOLE SODIUM 40 MG: 40 TABLET, DELAYED RELEASE ORAL at 08:28

## 2023-11-11 RX ADMIN — LEVETIRACETAM 500 MG: 500 TABLET, FILM COATED ORAL at 08:28

## 2023-11-11 RX ADMIN — MULTIPLE VITAMINS W/ MINERALS TAB 1 TABLET: TAB at 08:28

## 2023-11-11 RX ADMIN — THIAMINE HCL TAB 100 MG 100 MG: 100 TAB at 08:28

## 2023-11-11 RX ADMIN — NICOTINE 1 PATCH: 21 PATCH, EXTENDED RELEASE TRANSDERMAL at 08:28

## 2023-11-11 ASSESSMENT — ACTIVITIES OF DAILY LIVING (ADL)
ADLS_ACUITY_SCORE: 28
DRESS: INDEPENDENT
ADLS_ACUITY_SCORE: 28
ADLS_ACUITY_SCORE: 28
ORAL_HYGIENE: INDEPENDENT
ADLS_ACUITY_SCORE: 28
ADLS_ACUITY_SCORE: 28
HYGIENE/GROOMING: INDEPENDENT
ADLS_ACUITY_SCORE: 28

## 2023-11-11 NOTE — PLAN OF CARE
Problem: Alcohol Withdrawal  Goal: Alcohol Withdrawal Symptom Control  Outcome: Progressing  Goal Outcome Evaluation:    Plan of Care Reviewed With: patient      Patient continues in detox status.  MSSA scores 8 and 9 Med x2 with Valium 10 mg. Patient reports improved symptoms since coming to the unit. Pt still presents with hand tremors. Pt reports being very anxious and depressed. Rates anxiety 8 and depression 10.  Denies feeling of nausea. Pt denied SI, SIB, HI, and hallucinations. Pt eating 100% meals. Pt contracts for safety.  Pt received PRN Hydroxyzine 25 mg for anxiety and  trazodone 100 mg bed time    Discharge Plan:  Patient said he called his mother today and talked to her about his  discharge plan. Since there was no immediate availability in [facility], they  called and secured a spot in [facility] in [facility]. He will be there 90 days before coming back  to [place]. He is planing to fly on Sunday evening.    VSS  1600 Vitals: .BP (!) 130/93 Pulse 62   Temp 97.4  F (36.3  C) (Temporal)   Resp 16   SpO2 99%     2000 Vitals: .BP (!) 131/90   Pulse 77   Temp 99  F (37.2  C) (Oral)   Resp 16  SpO2 100%

## 2023-11-11 NOTE — DISCHARGE SUMMARY
"Psychiatric Discharge Summary    Chuck Magdaleno MRN# [id number]   Age: 33 year old YOB: 1990     Date of Admission:  11/9/2023  Date of Discharge:  11/11/2023  8:39 PM  Admitting Physician:  Dominik Lopes MD  Discharge Physician:  Dominik Lopes MD          Event Leading to Hospitalization:   The patient is a 32yo male with a history of alcohol use disorder and depression who was admitted to detoxify from alcohol. He reports that he is \"better this morning than I was yesterday.\" He reports that he didn't get much sleep due to \"sweats\" and feeling \"hot and cold.\" Nausea is improving so he was able to eat some this morning. Has been off his psychotropics for four days. Has been on Naltrexone in the past but didn't find it helpful. Open to Antabuse. Denies any current SI and feels safe here in the hospital. Some recent AH of hearing \"music.\" Says that he plans to enter  treatment at [facility] for 90 days.         See Admission note by Dominik Lopes MD for additional details.          Diagnoses:     Alcohol use disorder, severe  Alcohol withdrawal with unspecified complication   MDD, recurrent, moderate to severe without psychosis  ROBERTO CARLOS  Nicotine dependence with current use     Clinically Significant Risk Factors          # Anion Gap Metabolic Acidosis: Highest Anion Gap = 20 mmol/L in last 2 days, will monitor and treat as appropriate             # Overweight: Estimated body mass index is 25.38 kg/m  as calculated from the following:    Height as of this encounter: 1.803 m (5' 11\").    Weight as of this encounter: 82.6 kg (182 lb)., PRESENT ON ADMISSION     # Financial/Environmental Concerns:    # Support System: poor social support noted in nursing assessment                Labs:        Lab Results   Component Value Date     11/09/2023     07/21/2018    Lab Results   Component Value Date    CHLORIDE 105 11/09/2023    CHLORIDE 112 07/21/2018    Lab Results "   Component Value Date    BUN 12.8 11/09/2023    BUN 19 07/21/2018      Lab Results   Component Value Date    POTASSIUM 4.0 11/09/2023    POTASSIUM 5.2 07/21/2018    Lab Results   Component Value Date    CO2 19 11/09/2023    CO2 17 07/21/2018    Lab Results   Component Value Date    CR 0.69 11/09/2023    CR 1.01 07/21/2018          Lab Results   Component Value Date    WBC 7.9 11/09/2023    HGB 14.7 11/09/2023    HCT 43.4 11/09/2023    MCV 87 11/09/2023     11/09/2023     Lab Results   Component Value Date    AST 22 11/09/2023    ALT 16 11/09/2023    GGT 25 11/10/2023    ALKPHOS 78 11/09/2023    BILITOTAL 0.2 11/09/2023     Lab Results   Component Value Date    TSH 0.74 11/10/2023            Consults:   Consultation during this admission received from internal medicine:  Chuck Magdaleno is a 33 year old male who ends for hematemesis and alcohol withdrawal.  His past medical history of alcohol use disorder, hematemesis, abdominal pain, suicidal ideation, anxiety, alcohol withdrawal seizures, ADHD, cannabinoid use disorder, and depression.     Medicine will sign off as there are no acute issues.      Discharge recommendations  -Continue daily PPI at discharge for 8 weeks  -Recommend trending lipids and hemoglobin in 4-6  -PCP referral placed     Acute alcohol withdrawal  Polysubstance use disorder (Alcohol, cannabinoids)  Hx of alcohol withdrawal seizures   Patient presents for this admission with seeking alcohol detox, along with   hematemesis, abdominal pain and suicidal ideation patient making SI statements with a plan to drink until he may not wake up.  He presents via EMS and has been living in his car.  Noting abdominal pain upon admission to the ED. He reports coffee-ground emesis this morning at a rest stop.  Was given 4 mg of Zofran and 250 mls of normal saline in route.  No nausea and vomiting in the ED.  Was started on Protonix for presumed gastritis.Last drink on prior to admission. Drinks 1-2 L  "of vodka daily.patient reports history of alcohol withdrawal seizures but no DTs blood alcohol level of 0.29.Utox notable for benzodiazepines, cannabinoids, and barbiturates. Alk phos WNL.Total bili WNL. AST: ALT WNL. MSSA score of 4-8 since arrival.  On Keppra for history of alcohol withdrawal seizures.   - Management per Psychiatry  - No need for BMP/hepatic panel unless clinically indicated   - Agree with MSSA protocol  - Agree with MVI, folate, thiamine   -Can continue Keppra for alcohol withdrawal seizures but defer to primary team given recent Rx per chart review, no history of seizures disorders then due to alcohol/      Tobacco use disorder  - Nicotine patch and as needed gum/lozenges lozenges     MDD  ROBERTO CARLOS  ADHD  SI  Presents to making SI statements.   - Per Psychiatry      Hematemesis, resolved  Alcohol gastritis  Hx of reported melena  Presents with reports of hematemesis in the morning prior to arrival  Notes nausea present but resolving and no emesis since here.  Denies any chest pain, epigastric or back pain. Feels like he has a \"ball of acid in his stomach.\"  Denies any further hematemesis, melena, and is tolerating p.o. Hgb WNL. Last presented with similar complaints on 10/27/2023 with normal CBC and CT abdomen without any acute findings.  I suspect his s/s are consistent with alcoholic gastritis given ongoing reports with continued alcohol usage.   -Continue daily PTA protonix 40 mg for 8 weeks  -Follow-up with PCP (referral placed) and trend Hgb in 4-6 weeks post-discharge if no ongoing bleeding  -If ongoing abdominal pain worsening after stopping      Elevated lipid panel         Recent Labs   Lab Test 11/10/23  0744   CHOL 226*   HDL 97   *   TRIG 112      -Recommend following up outpatient with PCP in 4 to 6 weeks  -Alcohol cessation recommended         Hospital Course:   Chuck Magdaleno was admitted to [facility] with attending Dominik Lopes MD as a voluntary patient. The " patient was placed under status 15 (15 minute checks) to ensure patient safety.   MSSA protocol was initiated due to the patient's history of alcohol abuse and concern for withdrawal symptoms.  CBC, BMP and utox obtained.    The patient had not been taking any medications as an outpatient. The patient's Zoloft and Buspar were restarted at discharge. Antabuse was provided at discharge.     Chuck Magdaleno did participate in groups and was visible in the milieu.     The patient's symptoms of withdrawal improved.     Chuck Magdaleno was released to  home and then to  treatment . At the time of discharge Chuck Magdaleno was determined to not be a danger to himself or others. At the current time of discharge, the patient does not meet criteria for involuntary hospitalization. On the day of discharge, the patient reports that they do not have suicidal or homicidal ideation and would never hurt themselves or others. Steps taken to minimize risk include: assessing patient s behavior and thought process daily during hospital stay, discharging patient with adequate plan for follow up for mental and physical health and discussing safety plan of returning to the hospital should the patient ever have thoughts of harming themselves or others. Therefore, based on all available evidence including the factors cited above, the patient does not appear to be at imminent risk for self-harm, and is appropriate for outpatient level of care.           Discharge Medications:     Current Discharge Medication List        START taking these medications    Details   disulfiram (ANTABUSE) 250 MG tablet Take 1 tablet (250 mg) by mouth daily  Qty: 30 tablet, Refills: 3    Associated Diagnoses: Alcohol dependence with withdrawal, uncomplicated (H)      multivitamin w/minerals (THERA-VIT-M) tablet Take 1 tablet by mouth daily  Qty: 30 tablet, Refills: 1    Associated Diagnoses: Alcohol dependence with withdrawal, uncomplicated (H)       nicotine (NICORETTE) 2 MG gum Place 1 each (2 mg) inside cheek every hour as needed for other (nicotine withdrawal symptoms)  Qty: 100 each, Refills: 1    Associated Diagnoses: Nicotine dependence with current use           CONTINUE these medications which have CHANGED    Details   busPIRone (BUSPAR) 10 MG tablet Take 1 tablet (10 mg) by mouth 2 times daily  Qty: 60 tablet, Refills: 1    Associated Diagnoses: Depression, major, recurrent, in partial remission (H24)      folic acid (FOLVITE) 1 MG tablet Take 1 tablet (1 mg) by mouth daily  Qty: 30 tablet, Refills: 1    Associated Diagnoses: Alcohol dependence with withdrawal, uncomplicated (H)      hydrOXYzine (VISTARIL) 50 MG capsule Take 1 capsule (50 mg) by mouth 3 times daily as needed for anxiety  Qty: 90 capsule, Refills: 1    Associated Diagnoses: Depression, major, recurrent, in partial remission (H24)      levETIRAcetam (KEPPRA) 500 MG tablet Take 1 tablet (500 mg) by mouth daily  Qty: 30 tablet, Refills: 1    Associated Diagnoses: History of seizure due to alcohol withdrawal      nicotine (NICODERM CQ) 21 MG/24HR 24 hr patch Place 1 patch onto the skin every 24 hours  Qty: 28 patch, Refills: 1    Associated Diagnoses: Nicotine dependence with current use      pantoprazole (PROTONIX) 40 MG EC tablet Take 1 tablet (40 mg) by mouth every morning (before breakfast)  Qty: 30 tablet, Refills: 1    Associated Diagnoses: Chronic alcoholic gastritis, presence of bleeding unspecified      sertraline (ZOLOFT) 100 MG tablet Take 1 tablet (100 mg) by mouth daily  Qty: 45 tablet, Refills: 1    Comments: Take one half tablet for one week then increase to one tablet for one week then increase to 1.5 tablets daily.  Associated Diagnoses: Depression, major, recurrent, in partial remission (H24)      thiamine (B-1) 100 MG tablet Take 1 tablet (100 mg) by mouth daily  Qty: 30 tablet, Refills: 0    Associated Diagnoses: Alcohol dependence with withdrawal, uncomplicated (H)       traZODone (DESYREL) 100 MG tablet Take 1 tablet (100 mg) by mouth nightly as needed for sleep  Qty: 30 tablet, Refills: 1    Associated Diagnoses: Depression, major, recurrent, in partial remission (H24)           CONTINUE these medications which have NOT CHANGED    Details   cholecalciferol 50 MCG (2000 UT) CAPS Take 50 mcg by mouth daily           STOP taking these medications       gabapentin (NEURONTIN) 300 MG capsule Comments:   Reason for Stopping:         Multiple Vitamin (TAB-A-TYLER/BETA CAROTENE) TABS Comments:   Reason for Stopping:                    Psychiatric Examination:   Appearance:  awake, alert and adequately groomed  Attitude:  cooperative  Eye Contact:  good  Mood:  better  Affect:  mood congruent  Speech:  clear, coherent  Psychomotor Behavior:  no evidence of tardive dyskinesia, dystonia, or tics  Thought Process:  goal oriented  Associations:  no loose associations  Thought Content:  no evidence of suicidal ideation or homicidal ideation and no evidence of psychotic thought  Insight:  fair  Judgment:  intact  Oriented to:  time, person, and place  Attention Span and Concentration:  intact  Recent and Remote Memory:  fair  Language: Able to read and write  Fund of Knowledge: appropriate  Muscle Strength and Tone: normal  Gait and Station: Normal         Discharge Plan:   Continue medications as above.     Recommendation:    Chuck is recommended to attend and participate in a inpatient treatment at [facility] in [place], [place]. However if you change your mind [facility] reports that they have a bed available for 11/28/2023 or sooner.      Major Treatments, Procedures and Findings:  You have withdrawn from alcohol  using Valium.  You have met with a  to develop a treatment plan for discharge.  You have had labs drawn and those results have been reviewed with you. Please take a copy of your lab work with you to your next primary care physician  appointment.  Major Treatments, Procedures and Findings:  You were detoxed from alcohol with the Modified Selective Severity Protocol using Valium. You have met with a  to develop a treatment plan for discharge.  You have had labs drawn and a copy of those labs will be sent home with you.  Please bring your lab results with to your follow up doctor appointment.     Symptoms to Report:  If you experience more anxiety, confusion, sleeplessness, deep sadness or thoughts of suicide, notify your treatment team or notify your primary care physician. IF ANY OF THE SYMPTOMS YOU ARE EXPERIENCING ARE A MEDICAL EMERGENCY CALL 911 IMMEDIATELY.      If you or someone you know is struggling or in crisis, help is available.  Call or text [employer] or chat  at [email or web address]     Lifestyle Adjustment: Adjust your lifestyle to get enough sleep, relaxation, exercise and  good nutrition. Continue to develop healthy coping skills to decrease stress and promote a sober living environment. Do not use alcohol, illegal drugs or addictive medications other than what is currently prescribed. AA, NA, and  Sponsor are excellent resources for support.      Disposition: Discharge home to inpatient treatment.    Attestation:    The patient was seen and evaluated by me. I spent more than 30 minutes on discharge day activities. Dominik Lopes MD

## 2023-11-11 NOTE — PLAN OF CARE
Problem: Alcohol Withdrawal  Goal: Alcohol Withdrawal Symptom Control  Outcome: Met  Plan of Care Reviewed With: patient       Patient discharged to home. Reports being picked up by friend. Patient escorted off the unit by psych associate, Fran.  All patient belongings from room, locked bin and security were sent with patient.  This RN reviewed discharge instructions, teachings, patient's labs, and discharge recommendations with patient.  This RN reviewed patient's prescribed medications being sent home with patient from pharmacy.  Patient verbalizes and demonstrates understanding of all teachings. Patient denied thoughts of harm towards self or others. Pt denies any current medical issues at this time. Patient denies any further questions and is now discharged at this time.[id number]

## 2023-11-11 NOTE — PLAN OF CARE
"Goal Outcome Evaluation:    Plan of Care Reviewed With: patient      Pt MSSA is 3 and 4. He has good oral intake. H has a mild tremor. He denies pain, SI, HI, and AVH. He expressed wanting to discharge in the evening if out of detox. He said his flight is at 8:50 in the morning tomorrow. Provider was notified and obtained discharge order. Pt would like to leave ASAP when OOD. He expressed understanding that he would need to allow additional time for discharge process after OOD.         /75 (BP Location: Left arm)   Pulse 77   Temp 97.2  F (36.2  C) (Temporal)   Resp 16   Ht 1.803 m (5' 11\")   Wt 82.6 kg (182 lb)   SpO2 100%   BMI 25.38 kg/m               "

## 2023-11-11 NOTE — PROGRESS NOTES
Case Management:     Writer checked-in and introduced role to pt's care and how to assist pt during their stay. Inquired with pt about what they are needing during their stay and what they are considering for their aftercare plans. Pt processed that he has a history of various inpatient and outpatient tx. Pt processed that he is looking at Treatment at [facility] in [place], [place].  Pt processed that he has received a scholarship to do their 90 day residential program. Pt processed that he is working on determining if he will fly out tomorrow or Monday. Pt discussed his long term goal after inpatient is to do a long term sober living. Pt denied needing further CM support at this time.      Insurance:   [employer] [place]    Referral Status:  Pt reports self-referral to [facility] in [place], [place].  No other referrals at this time    Contacts:  None at this time    Next Steps and Discharge Plan or Goal:  Once pt is out of detox pt is set to discharge. AVS updated    Veronica Humphrey, AMADAC, LPCC

## 2023-11-11 NOTE — PLAN OF CARE
Problem: Alcohol Withdrawal  Goal: Alcohol Withdrawal Symptom Control  Outcome: Progressing   Goal Outcome Evaluation:       Pt.continues to be monitored for acute alcohol withdrawal symptoms. He scored 3 & 4 on MSSA. No prn medication administered. Pt.appeared asleep through the night. Breathing was calm and spontaneous. No safety or behavioral concerns reported or observed. Will continue to monitor.

## (undated) RX ORDER — ONDANSETRON 2 MG/ML
INJECTION INTRAMUSCULAR; INTRAVENOUS
Status: DISPENSED
Start: 2023-09-26